# Patient Record
Sex: MALE | Race: WHITE | NOT HISPANIC OR LATINO | Employment: FULL TIME | URBAN - METROPOLITAN AREA
[De-identification: names, ages, dates, MRNs, and addresses within clinical notes are randomized per-mention and may not be internally consistent; named-entity substitution may affect disease eponyms.]

---

## 2017-12-12 ENCOUNTER — TELEPHONE (OUTPATIENT)
Dept: INTERNAL MEDICINE | Facility: CLINIC | Age: 47
End: 2017-12-12

## 2017-12-12 DIAGNOSIS — Z00.00 ROUTINE MEDICAL EXAM: Primary | ICD-10-CM

## 2017-12-12 NOTE — TELEPHONE ENCOUNTER
----- Message from Juli Chaudhari sent at 12/12/2017  2:54 PM CST -----  Contact: self [phone]  Patient wants to be seen this year for his annual and would like blood work as well, please call him and advise.    Thank you

## 2017-12-18 ENCOUNTER — LAB VISIT (OUTPATIENT)
Dept: LAB | Facility: HOSPITAL | Age: 47
End: 2017-12-18
Payer: COMMERCIAL

## 2017-12-18 DIAGNOSIS — Z00.00 ROUTINE MEDICAL EXAM: ICD-10-CM

## 2017-12-18 LAB
ALBUMIN SERPL BCP-MCNC: 4.1 G/DL
ALP SERPL-CCNC: 68 U/L
ALT SERPL W/O P-5'-P-CCNC: 36 U/L
ANION GAP SERPL CALC-SCNC: 10 MMOL/L
AST SERPL-CCNC: 29 U/L
BILIRUB SERPL-MCNC: 0.7 MG/DL
BUN SERPL-MCNC: 20 MG/DL
CALCIUM SERPL-MCNC: 9.2 MG/DL
CHLORIDE SERPL-SCNC: 105 MMOL/L
CHOLEST SERPL-MCNC: 201 MG/DL
CHOLEST/HDLC SERPL: 7.2 {RATIO}
CO2 SERPL-SCNC: 24 MMOL/L
COMPLEXED PSA SERPL-MCNC: 0.45 NG/ML
CREAT SERPL-MCNC: 1.5 MG/DL
EST. GFR  (AFRICAN AMERICAN): >60 ML/MIN/1.73 M^2
EST. GFR  (NON AFRICAN AMERICAN): 54 ML/MIN/1.73 M^2
GLUCOSE SERPL-MCNC: 90 MG/DL
HDLC SERPL-MCNC: 28 MG/DL
HDLC SERPL: 13.9 %
LDLC SERPL CALC-MCNC: ABNORMAL MG/DL
NONHDLC SERPL-MCNC: 173 MG/DL
POTASSIUM SERPL-SCNC: 4.3 MMOL/L
PROT SERPL-MCNC: 7.1 G/DL
SODIUM SERPL-SCNC: 139 MMOL/L
TRIGL SERPL-MCNC: 571 MG/DL

## 2017-12-18 PROCEDURE — 80053 COMPREHEN METABOLIC PANEL: CPT

## 2017-12-18 PROCEDURE — 84153 ASSAY OF PSA TOTAL: CPT

## 2017-12-18 PROCEDURE — 80061 LIPID PANEL: CPT

## 2017-12-18 PROCEDURE — 36415 COLL VENOUS BLD VENIPUNCTURE: CPT

## 2017-12-20 ENCOUNTER — OFFICE VISIT (OUTPATIENT)
Dept: INTERNAL MEDICINE | Facility: CLINIC | Age: 47
End: 2017-12-20
Payer: COMMERCIAL

## 2017-12-20 VITALS
BODY MASS INDEX: 29.58 KG/M2 | HEART RATE: 72 BPM | WEIGHT: 237.88 LBS | DIASTOLIC BLOOD PRESSURE: 83 MMHG | HEIGHT: 75 IN | SYSTOLIC BLOOD PRESSURE: 124 MMHG

## 2017-12-20 DIAGNOSIS — E78.1 HYPERTRIGLYCERIDEMIA: ICD-10-CM

## 2017-12-20 DIAGNOSIS — H93.13 TINNITUS OF BOTH EARS: ICD-10-CM

## 2017-12-20 DIAGNOSIS — Z00.00 ROUTINE PHYSICAL EXAMINATION: Primary | ICD-10-CM

## 2017-12-20 PROCEDURE — 99999 PR PBB SHADOW E&M-EST. PATIENT-LVL III: CPT | Mod: PBBFAC,,, | Performed by: INTERNAL MEDICINE

## 2017-12-20 PROCEDURE — 99396 PREV VISIT EST AGE 40-64: CPT | Mod: S$GLB,,, | Performed by: INTERNAL MEDICINE

## 2017-12-20 NOTE — PROGRESS NOTES
Subjective:       Patient ID: Jason Dutton is a 47 y.o. male.    Chief Complaint: Annual Exam    HPI: Patient comes in for annual exam.  Overall he is doing well.  He does take anti-inflammatories somewhat regularly for his back and joints.  We did discuss trying to minimize this because it may be affecting his kidney function.  His creatinine has been 1.5 for a few years.  Also of note is that his triglycerides are elevated.  He says a brother of his had high triglycerides and then pancreatitis.  It was suggested that we consider medication but he wanted to try to reduce intake of fats and starches, lose weight, increase exercise and see how this goes.  His triglycerides were above 500.      Review of Systems   Constitutional: Negative for activity change, chills, fatigue, fever and unexpected weight change.   HENT: Positive for hearing loss ( he did seeAn outside ENT for this) and tinnitus. Negative for nosebleeds, rhinorrhea and trouble swallowing.    Eyes: Negative for pain, discharge and visual disturbance.   Respiratory: Negative for cough, chest tightness, shortness of breath and wheezing.    Cardiovascular: Negative for chest pain and palpitations.   Gastrointestinal: Negative for abdominal pain, blood in stool, constipation, diarrhea, nausea and vomiting.   Endocrine: Negative for polydipsia and polyuria.   Genitourinary: Negative for difficulty urinating, hematuria and urgency.   Musculoskeletal: Negative for arthralgias, joint swelling and neck pain.   Skin: Negative for rash.   Neurological: Negative for dizziness, weakness and headaches.   Hematological: Does not bruise/bleed easily.   Psychiatric/Behavioral: Negative for confusion, dysphoric mood, sleep disturbance and suicidal ideas.       Objective:      Physical Exam   Constitutional: He is oriented to person, place, and time. He appears well-developed and well-nourished. No distress.   HENT:   Head: Normocephalic and atraumatic.   Right Ear:  External ear normal.   Left Ear: External ear normal.   Mouth/Throat: Oropharynx is clear and moist. No oropharyngeal exudate.   TM's clear, pharynx clear   Eyes: Conjunctivae and EOM are normal. Pupils are equal, round, and reactive to light. No scleral icterus.   Neck: Normal range of motion. Neck supple. No thyromegaly present.   No supraclavicular nodes palpated   Cardiovascular: Normal rate, regular rhythm and normal heart sounds.    No murmur heard.  Pulmonary/Chest: Effort normal and breath sounds normal. He has no wheezes.   Abdominal: Soft. Bowel sounds are normal. He exhibits no mass. There is no tenderness.   Musculoskeletal: He exhibits no edema, tenderness or deformity.   Lymphadenopathy:     He has no cervical adenopathy.   Neurological: He is alert and oriented to person, place, and time. No cranial nerve deficit.   Skin: No rash noted. No pallor.   Psychiatric: He has a normal mood and affect. His behavior is normal. Thought content normal.       Assessment:       1. Routine physical examination    2. Hypertriglyceridemia    3. Tinnitus of both ears        Plan:       Jason was seen today for annual exam.    Diagnoses and all orders for this visit:    Routine physical examination    Hypertriglyceridemia  -     Lipid panel; Future    Tinnitus of both ears

## 2017-12-29 ENCOUNTER — OFFICE VISIT (OUTPATIENT)
Dept: OTOLARYNGOLOGY | Facility: CLINIC | Age: 47
End: 2017-12-29
Payer: COMMERCIAL

## 2017-12-29 ENCOUNTER — CLINICAL SUPPORT (OUTPATIENT)
Dept: OTOLARYNGOLOGY | Facility: CLINIC | Age: 47
End: 2017-12-29
Payer: COMMERCIAL

## 2017-12-29 VITALS
BODY MASS INDEX: 29.47 KG/M2 | DIASTOLIC BLOOD PRESSURE: 88 MMHG | SYSTOLIC BLOOD PRESSURE: 147 MMHG | HEART RATE: 61 BPM | WEIGHT: 237 LBS | HEIGHT: 75 IN

## 2017-12-29 DIAGNOSIS — M95.0 NASAL VALVE COLLAPSE: ICD-10-CM

## 2017-12-29 DIAGNOSIS — J34.3 HYPERTROPHY OF BOTH INFERIOR NASAL TURBINATES: ICD-10-CM

## 2017-12-29 DIAGNOSIS — J30.9 ALLERGIC RHINITIS, UNSPECIFIED CHRONICITY, UNSPECIFIED SEASONALITY, UNSPECIFIED TRIGGER: ICD-10-CM

## 2017-12-29 DIAGNOSIS — H90.42 SENSORINEURAL HEARING LOSS (SNHL) OF LEFT EAR WITH UNRESTRICTED HEARING OF RIGHT EAR: ICD-10-CM

## 2017-12-29 DIAGNOSIS — H90.42 SENSORINEURAL HEARING LOSS (SNHL) OF LEFT EAR WITH UNRESTRICTED HEARING OF RIGHT EAR: Primary | ICD-10-CM

## 2017-12-29 DIAGNOSIS — H93.12 TINNITUS, LEFT: Primary | ICD-10-CM

## 2017-12-29 DIAGNOSIS — J34.2 NASAL SEPTAL DEVIATION: ICD-10-CM

## 2017-12-29 DIAGNOSIS — H93.12 TINNITUS OF LEFT EAR: ICD-10-CM

## 2017-12-29 PROBLEM — J34.829 NASAL VALVE COLLAPSE: Status: ACTIVE | Noted: 2017-12-29

## 2017-12-29 PROCEDURE — 92567 TYMPANOMETRY: CPT | Mod: S$GLB,,, | Performed by: AUDIOLOGIST

## 2017-12-29 PROCEDURE — 92557 COMPREHENSIVE HEARING TEST: CPT | Mod: S$GLB,,, | Performed by: AUDIOLOGIST

## 2017-12-29 PROCEDURE — 99204 OFFICE O/P NEW MOD 45 MIN: CPT | Mod: S$GLB,,, | Performed by: SPECIALIST

## 2017-12-29 RX ORDER — IBUPROFEN 200 MG
200 TABLET ORAL EVERY 6 HOURS PRN
COMMUNITY
End: 2020-07-14

## 2017-12-29 NOTE — PROGRESS NOTES
Normal hearing in the right ear.  Normal to moderate rising to mild sensorineural hearing loss in the left ear.  Type A tympanograms bilaterally.  Could not maintain seal for reflexes bilaterally.

## 2017-12-29 NOTE — PATIENT INSTRUCTIONS
Tinnitus  Tinnitus (Ringing in the Ears)     Treatment may include maskers and hearing aids.     Tinnitus is the term for a noise in your ear not caused by an outside sound. The noise might be a ringing, clicking, hiss, or roar. It can vary in pitch and may be soft or quite loud. For some people, tinnitus is a minor nuisance. But for others, the noise can make it hard to hear, work, and even sleep. When tinnitus can't be cured, a number of treatments may offer relief.  What causes tinnitus?  Loud noises, hearing loss, and ear wax can cause tinnitus. So can certain medicines. Large amounts of aspirin or caffeine are sometimes to blame. In many cases, the exact cause of tinnitus is unknown.  How is tinnitus treated?  Identifying and removing the cause is the best way to treat tinnitus. For that reason, your healthcare provider may refer you to an otolaryngologist (ear, nose, and throat doctor). Your hearing may also be checked by an audiologist (hearing specialist). If you have hearing loss, wearing a hearing aid may help your tinnitus. When the cause can't be found, the tinnitus itself may be treated. Some of the treatments are listed below, and your healthcare provider can tell you more about them:  · Maskers are small devices that look like hearing aids. They emit a pleasant sound that helps cover up the ringing in your ears. Hearing aids and maskers are sometimes used together.  · Medicines that treat anxiety and depression may ease tinnitus in some people.  · Hypnosis or relaxation therapy may help head noise seem less severe.  · Tinnitus retraining therapy combines counseling and maskers. Both can help take your mind off the tinnitus.  For more information  · American Speech-Hearing-Language Association [phone] www.sherrie.org  · American Tinnitus Association [phone] www.namrata.org  · National [place] on Deafness and other Communication Disorders 949-241-8082 www.nidcd.nih.gov   Date Last Reviewed:  7/1/2016  © 9416-8776 [employer]. [address] [place] [address], [place] [address]. All rights reserved. This information is not intended as a substitute for professional medical care. Always follow your healthcare professional's instructions.

## 2018-01-11 ENCOUNTER — HOSPITAL ENCOUNTER (OUTPATIENT)
Dept: RADIOLOGY | Facility: OTHER | Age: 48
Discharge: HOME | End: 2018-01-11
Attending: SPECIALIST
Payer: COMMERCIAL

## 2018-01-11 ENCOUNTER — TELEPHONE (OUTPATIENT)
Dept: OTOLARYNGOLOGY | Facility: CLINIC | Age: 48
End: 2018-01-11

## 2018-01-11 DIAGNOSIS — H90.42 SENSORINEURAL HEARING LOSS (SNHL) OF LEFT EAR WITH UNRESTRICTED HEARING OF RIGHT EAR: ICD-10-CM

## 2018-01-11 DIAGNOSIS — H93.12 TINNITUS OF LEFT EAR: ICD-10-CM

## 2018-01-11 PROCEDURE — 70553 MRI BRAIN STEM W/O & W/DYE: CPT | Mod: 26,,, | Performed by: RADIOLOGY

## 2018-01-11 PROCEDURE — A9585 GADOBUTROL INJECTION: HCPCS | Performed by: SPECIALIST

## 2018-01-11 PROCEDURE — 25500020 PHARM REV CODE 255: Performed by: SPECIALIST

## 2018-01-11 PROCEDURE — 70553 MRI BRAIN STEM W/O & W/DYE: CPT | Mod: TC

## 2018-01-11 RX ORDER — GADOBUTROL 604.72 MG/ML
10 INJECTION INTRAVENOUS
Status: COMPLETED | OUTPATIENT
Start: 2018-01-11 | End: 2018-01-11

## 2018-01-11 RX ADMIN — GADOBUTROL 10 ML: 604.72 INJECTION INTRAVENOUS at 04:01

## 2018-01-11 NOTE — TELEPHONE ENCOUNTER
Call Mr. Dutton actually on 01/10/2018 at 4:38 pm evening. His concern was that Dr Turner order an MRI for him upon scheduling his appt. With radiology there were not clear on if his MRI is with are without contrast.    Clarity with  that his MRI is to be with contrast per Dr. Turner orders. I also inform Mr. Dutton of his financial lability.

## 2018-01-11 NOTE — TELEPHONE ENCOUNTER
----- Message from Susan Jorgensen sent at 1/10/2018  3:22 PM CST -----  Contact: pt  x_  1st Request  _  2nd Request  _  3rd Request    Who: YARELY SALOMON [[id number]]    Why: Patient would like to speak with staff in reference to his MRI that scheduled for tomorrow.     What Number to Call Back:[phone]    When to Expect a call back: (Within 24 hours)    Please return the call at earliest convenience. Thanks!

## 2018-01-29 ENCOUNTER — OFFICE VISIT (OUTPATIENT)
Dept: OTOLARYNGOLOGY | Facility: CLINIC | Age: 48
End: 2018-01-29
Payer: COMMERCIAL

## 2018-01-29 VITALS
HEIGHT: 75 IN | DIASTOLIC BLOOD PRESSURE: 89 MMHG | BODY MASS INDEX: 29.47 KG/M2 | TEMPERATURE: 98 F | WEIGHT: 237 LBS | SYSTOLIC BLOOD PRESSURE: 133 MMHG | HEART RATE: 56 BPM

## 2018-01-29 DIAGNOSIS — M95.0 NASAL VALVE COLLAPSE: ICD-10-CM

## 2018-01-29 DIAGNOSIS — J34.2 NASAL SEPTAL DEVIATION: ICD-10-CM

## 2018-01-29 DIAGNOSIS — H90.42 SENSORINEURAL HEARING LOSS (SNHL) OF LEFT EAR WITH UNRESTRICTED HEARING OF RIGHT EAR: Primary | ICD-10-CM

## 2018-01-29 DIAGNOSIS — H93.12 TINNITUS OF LEFT EAR: ICD-10-CM

## 2018-01-29 DIAGNOSIS — J30.9 ALLERGIC RHINITIS, UNSPECIFIED CHRONICITY, UNSPECIFIED SEASONALITY, UNSPECIFIED TRIGGER: ICD-10-CM

## 2018-01-29 PROCEDURE — 99214 OFFICE O/P EST MOD 30 MIN: CPT | Mod: S$GLB,,, | Performed by: SPECIALIST

## 2018-01-29 NOTE — PROGRESS NOTES
Subjective:       Patient ID: Jason Dutton is a 47 y.o. male.    Chief Complaint: Follow-up    The patient is returning for a follow-up visit.  He continues to have tinnitus bilaterally.  It varies both in intensity and in frequency.  He has been taking lipoflavonoid's for the last month and is uncertain that he is noticing any particular changes.  She also using a noise machine in his bedroom at night.  He has been using Flonase intermittently but does not feel that he needs to be taking anything at the moment.      Review of Systems   Constitutional: Negative for activity change, appetite change, chills, fatigue, fever and unexpected weight change.   HENT: Positive for congestion, postnasal drip, sinus pressure, sore throat and tinnitus (on the left only). Negative for drooling, ear discharge, ear pain, facial swelling, hearing loss, mouth sores, rhinorrhea, sinus pain, sneezing, trouble swallowing and voice change.    Eyes: Negative for photophobia, pain, discharge, redness, itching and visual disturbance.   Respiratory: Positive for cough. Negative for apnea, choking, shortness of breath and wheezing.    Cardiovascular: Negative for chest pain and palpitations.   Gastrointestinal: Negative for abdominal distention, abdominal pain, nausea and vomiting.   Musculoskeletal: Negative for arthralgias, myalgias, neck pain and neck stiffness.   Skin: Negative.  Negative for color change, pallor and rash.   Allergic/Immunologic: Negative for environmental allergies, food allergies and immunocompromised state.   Neurological: Positive for headaches. Negative for dizziness, seizures, facial asymmetry, speech difficulty, weakness, light-headedness and numbness.   Hematological: Negative for adenopathy. Does not bruise/bleed easily.   Psychiatric/Behavioral: Negative for agitation, confusion, decreased concentration and sleep disturbance.       Objective:      Physical Exam   Constitutional: He is oriented to person,  place, and time. He appears well-developed and well-nourished. He is cooperative.   HENT:   Head: Normocephalic.   Right Ear: External ear and ear canal normal. Tympanic membrane is retracted.   Left Ear: External ear and ear canal normal. Tympanic membrane is retracted.   Nose: Mucosal edema (cyanotic, boggy inferior turbinates bilaterally), rhinorrhea (clear mucus bilaterally), nasal deformity (typically deviated to the right, high deviation to the left, bilateral internal nasal valve collapse with deepinspiration) and septal deviation (To the right with inferior septum deviated off the nasal spine to the left) present.   Mouth/Throat: Uvula is midline, oropharynx is clear and moist and mucous membranes are normal.   Eyes: EOM and lids are normal. Pupils are equal, round, and reactive to light. Right eye exhibits no discharge and no exudate. Left eye exhibits no discharge and no exudate. Right conjunctiva is injected. Left conjunctiva is injected.   Neck: Trachea normal and normal range of motion. No muscular tenderness present. No tracheal deviation present. No thyroid mass and no thyromegaly present.   Cardiovascular: Normal rate, regular rhythm, normal heart sounds and normal pulses.    Pulmonary/Chest: Effort normal and breath sounds normal. No stridor. He has no wheezes. He has no rhonchi. He has no rales.   Abdominal: Soft. Bowel sounds are normal. There is no tenderness.   Musculoskeletal: Normal range of motion.   Lymphadenopathy:        Head (right side): No submental, no submandibular, no preauricular, no posterior auricular and no occipital adenopathy present.        Head (left side): No submental, no submandibular, no preauricular, no posterior auricular and no occipital adenopathy present.     He has no cervical adenopathy.   Neurological: He is alert and oriented to person, place, and time. He has normal strength. No cranial nerve deficit or sensory deficit. Gait normal.   Skin: Skin is warm and dry.  No petechiae and no rash noted. No cyanosis. Nails show no clubbing.   Psychiatric: He has a normal mood and affect. His speech is normal and behavior is normal. Judgment and thought content normal. Cognition and memory are normal.       MRI of the internal auditory canals and brain-no tumor or stroke, multiple hyperintense areas in the white matter  Assessment:       1. Sensorineural hearing loss (SNHL) of left ear with unrestricted hearing of right ear    2. Tinnitus of left ear    3. Nasal septal deviation    4. Nasal valve collapse    5. Allergic rhinitis, unspecified chronicity, unspecified seasonality, unspecified trigger        Plan:       I will have the patient continue with his Leppo flavonoid's.  He will use the Flonase and OTC antihistamine to control allergy symptoms.  He will return sooner if there are problems.

## 2018-01-29 NOTE — PATIENT INSTRUCTIONS
Tinnitus (Ringing in the Ears)     Treatment may include maskers and hearing aids.     Tinnitus is the term for a noise in your ear not caused by an outside sound. The noise might be a ringing, clicking, hiss, or roar. It can vary in pitch and may be soft or quite loud. For some people, tinnitus is a minor nuisance. But for others, the noise can make it hard to hear, work, and even sleep. When tinnitus can't be cured, a number of treatments may offer relief.  What causes tinnitus?  Loud noises, hearing loss, and ear wax can cause tinnitus. So can certain medicines. Large amounts of aspirin or caffeine are sometimes to blame. In many cases, the exact cause of tinnitus is unknown.  How is tinnitus treated?  Identifying and removing the cause is the best way to treat tinnitus. For that reason, your healthcare provider may refer you to an otolaryngologist (ear, nose, and throat doctor). Your hearing may also be checked by an audiologist (hearing specialist). If you have hearing loss, wearing a hearing aid may help your tinnitus. When the cause can't be found, the tinnitus itself may be treated. Some of the treatments are listed below, and your healthcare provider can tell you more about them:  · Maskers are small devices that look like hearing aids. They emit a pleasant sound that helps cover up the ringing in your ears. Hearing aids and maskers are sometimes used together.  · Medicines that treat anxiety and depression may ease tinnitus in some people.  · Hypnosis or relaxation therapy may help head noise seem less severe.  · Tinnitus retraining therapy combines counseling and maskers. Both can help take your mind off the tinnitus.  For more information  · American Speech-Hearing-Language Association [phone] www.sherrie.org  · American Tinnitus Association [phone] www.namrata.org  · National [place] on Deafness and other Communication Disorders 835-991-8894 www.nidcd.nih.gov   Date Last Reviewed: 7/1/2016  ©  5269-9293 [employer]. [address] [place] [address], [place] [address]. All rights reserved. This information is not intended as a substitute for professional medical care. Always follow your healthcare professional's instructions.

## 2018-05-30 ENCOUNTER — PATIENT MESSAGE (OUTPATIENT)
Dept: INTERNAL MEDICINE | Facility: CLINIC | Age: 48
End: 2018-05-30

## 2018-05-31 ENCOUNTER — PATIENT MESSAGE (OUTPATIENT)
Dept: INTERNAL MEDICINE | Facility: CLINIC | Age: 48
End: 2018-05-31

## 2019-02-07 ENCOUNTER — TELEPHONE (OUTPATIENT)
Dept: SPINE | Facility: CLINIC | Age: 49
End: 2019-02-07

## 2019-02-07 DIAGNOSIS — M54.50 LUMBAR PAIN: Primary | ICD-10-CM

## 2019-02-07 NOTE — PROGRESS NOTES
Subjective:      Patient ID: Jason Dutton is a 48 y.o. male.    Chief Complaint: Low-back Pain      HPI  (Kalyvas)    History of hearing loss.     Chronic intermittent LBP over last 10 years or so that was usually self resolving. Saw Natasha back in 2015 with known annular tear L4-L5 per MRI 11/5/15.     Current flare up started on Tuesday with no injury. Now with constant right sided LBP with some radiation into his hip/buttock. No leg pain. Pain is better with laying flat. Pain is worse with walking and prolonged sitting. No numbness, tingling, or weakness in his leg. Pain is an 8 on a scale of 1-10. Pain can be sharp, shooting, stabbing, or aching. Has not been able to work in last two days due to pain.     He took some skelaxin and motrin without significant improvement. He did PT ([facility]) back in 2015 with improvement. He does work out on his own. No lumbar surgery. He had good improvement of his pain after bilateral L2-L5 MBB by Dr. Felder on 11/27/15.       Review of Systems   Constitution: Negative for fever, weakness, malaise/fatigue, night sweats, weight gain and weight loss.   HENT: Positive for hearing loss and tinnitus. Negative for nosebleeds and odynophagia.    Eyes: Negative for blurred vision and double vision.   Cardiovascular: Negative for chest pain, irregular heartbeat and palpitations.   Respiratory: Negative for cough, hemoptysis, shortness of breath and wheezing.    Endocrine: Negative for cold intolerance and polydipsia.   Hematologic/Lymphatic: Does not bruise/bleed easily.   Skin: Negative for dry skin, poor wound healing, rash and suspicious lesions.   Musculoskeletal: Positive for back pain and muscle cramps.        See HPI for pertinent positives.   Gastrointestinal: Negative for bloating, abdominal pain, constipation, diarrhea, hematochezia, melena, nausea and vomiting.   Genitourinary: Negative for bladder incontinence, dysuria, hematuria, hesitancy and incomplete emptying.    Neurological: Positive for paresthesias. Negative for disturbances in coordination, dizziness, focal weakness, headaches, loss of balance, numbness and seizures.   Psychiatric/Behavioral: Negative for depression and hallucinations. The patient is not nervous/anxious.            Objective:        General: Jason is well-developed, well-nourished, appears stated age, in no acute distress, alert and oriented to time, place and person.     General    Vitals reviewed.  Constitutional: He is oriented to person, place, and time. He appears well-developed and well-nourished.   Pulmonary/Chest: Effort normal.   Abdominal: He exhibits no distension.   Neurological: He is alert and oriented to person, place, and time.   Psychiatric: He has a normal mood and affect. His behavior is normal. Judgment and thought content normal.           Gait: normal, tandem walking is normal and he is able to heel/toe stand.     On exam of the lumbar spine, Inspection of back is normal, mild right sided lower lumbar tenderness.     Skin in lumbar region is warm to the touch without visible rashes.     muscle tone normal without spasm, limited range of motion with pain  Pain in extension.    Strength testing of the bilateral LEs shows  Right hip abduction:  +5/5  Left hip abduction:  +5/5  Right hip flexion:  +5/5   Left hip flexion:  +5/5  Right hip extensors:  +5/5  Left hip extensors:  +5/5  Right quadriceps:  +5/5  Left quadriceps:  +5/5  Right hamstring:  +5/5  Left hamstring:  +5/5  Right dorsiflexion:  +5/5  Left dorsiflexion:  +5/5  Right plantar flexion:  +5/5  Left plantar flexion:  +5/5   Right EHL:  +5/5   Left EHL:  +5/5    negative clonus of bilateral LEs.     negative straight leg raise on bilateral LEs.     DTRs:  Right patellar:  +2     Left patellar:  +2  Right achilles:  +2   Left achilles:  +2    Sensation is grossly intact in L2, L3, L4, L5, and S1 distribution.    Right hip has no pain with IR/ER.  No tenderness over greater  trochanteric bursa.  Left hip has no pain with IR/ER.  No tenderness over greater trochanteric bursa.     On exam of bilateral UEs, patient has full painfree ROM with no signs of clubbing, laxity, cyanosis, edema, instability, weakness, or tenderness.       XRAY INTERPRETATION:  X-rays of lumbar spine (AP/lat/flex/ext) dated 2/8/19 are personally reviewed and show minimal DDD/spondylosis L4-S1.        Assessment:       1. Myofascial muscle pain    2. Spondylosis of lumbar region without myelopathy or radiculopathy    3. DDD (degenerative disc disease), lumbosacral    4. Chronic right-sided low back pain without sciatica           Plan:       Orders Placed This Encounter    methylPREDNISolone (MEDROL DOSEPACK) 4 mg tablet         Chronic intermittent LBP over last 10 years or so that was usually self resolving. Saw Natasha back in 2015 with known annular tear L4-L5 per MRI 11/5/15. Current flare up started on Tuesday with no injury. Now with constant right sided LBP with some radiation into his hip/buttock. No leg pain. Known minimal DDD/spondylosis L4-S1. MRI 11/5/15 showed annular tear L4-L5. Previous improvement with lumbar MBB and PT. Current pain appears more myofascial in nature. Treatment options reviewed with patient along with above lumbar XRs. Following plan made:     - Medrol dose pack for symptom relief. Reviewed dosing and side effects.   - Consider revisiting PT and/or Healthy Back program if no improvement.   - Unlikely pain is hip mediated, but consider left hip XR if no improvement.   - If pain gets worse, consider updated lumbar MRI and possible injections.     Follow-up: Follow-up if symptoms worsen or fail to improve. If there are any questions prior to this, the patient was instructed to contact the office.

## 2019-02-08 ENCOUNTER — OFFICE VISIT (OUTPATIENT)
Dept: SPINE | Facility: CLINIC | Age: 49
End: 2019-02-08
Payer: COMMERCIAL

## 2019-02-08 ENCOUNTER — HOSPITAL ENCOUNTER (OUTPATIENT)
Dept: RADIOLOGY | Facility: OTHER | Age: 49
Discharge: HOME | End: 2019-02-08
Attending: PHYSICIAN ASSISTANT
Payer: COMMERCIAL

## 2019-02-08 VITALS
HEIGHT: 76 IN | DIASTOLIC BLOOD PRESSURE: 99 MMHG | SYSTOLIC BLOOD PRESSURE: 154 MMHG | HEART RATE: 63 BPM | TEMPERATURE: 97 F | WEIGHT: 240 LBS | BODY MASS INDEX: 29.22 KG/M2

## 2019-02-08 DIAGNOSIS — M79.18 MYOFASCIAL MUSCLE PAIN: Primary | ICD-10-CM

## 2019-02-08 DIAGNOSIS — M51.37 DDD (DEGENERATIVE DISC DISEASE), LUMBOSACRAL: ICD-10-CM

## 2019-02-08 DIAGNOSIS — M54.50 CHRONIC RIGHT-SIDED LOW BACK PAIN WITHOUT SCIATICA: ICD-10-CM

## 2019-02-08 DIAGNOSIS — G89.29 CHRONIC RIGHT-SIDED LOW BACK PAIN WITHOUT SCIATICA: ICD-10-CM

## 2019-02-08 DIAGNOSIS — M47.816 SPONDYLOSIS OF LUMBAR REGION WITHOUT MYELOPATHY OR RADICULOPATHY: ICD-10-CM

## 2019-02-08 DIAGNOSIS — M54.50 LUMBAR PAIN: ICD-10-CM

## 2019-02-08 PROCEDURE — 3008F PR BODY MASS INDEX (BMI) DOCUMENTED: ICD-10-PCS | Mod: CPTII,S$GLB,, | Performed by: PHYSICIAN ASSISTANT

## 2019-02-08 PROCEDURE — 99203 OFFICE O/P NEW LOW 30 MIN: CPT | Mod: S$GLB,,, | Performed by: PHYSICIAN ASSISTANT

## 2019-02-08 PROCEDURE — 72100 XR LUMBAR SPINE AP AND LAT WITH FLEX/EXT: ICD-10-PCS | Mod: 26,,, | Performed by: RADIOLOGY

## 2019-02-08 PROCEDURE — 99203 PR OFFICE/OUTPT VISIT, NEW, LEVL III, 30-44 MIN: ICD-10-PCS | Mod: S$GLB,,, | Performed by: PHYSICIAN ASSISTANT

## 2019-02-08 PROCEDURE — 99999 PR PBB SHADOW E&M-EST. PATIENT-LVL III: ICD-10-PCS | Mod: PBBFAC,,, | Performed by: PHYSICIAN ASSISTANT

## 2019-02-08 PROCEDURE — 72100 X-RAY EXAM L-S SPINE 2/3 VWS: CPT | Mod: 26,,, | Performed by: RADIOLOGY

## 2019-02-08 PROCEDURE — 72120 XR LUMBAR SPINE AP AND LAT WITH FLEX/EXT: ICD-10-PCS | Mod: 26,,, | Performed by: RADIOLOGY

## 2019-02-08 PROCEDURE — 72120 X-RAY BEND ONLY L-S SPINE: CPT | Mod: 26,,, | Performed by: RADIOLOGY

## 2019-02-08 PROCEDURE — 99999 PR PBB SHADOW E&M-EST. PATIENT-LVL III: CPT | Mod: PBBFAC,,, | Performed by: PHYSICIAN ASSISTANT

## 2019-02-08 PROCEDURE — 72120 X-RAY BEND ONLY L-S SPINE: CPT | Mod: TC,FY

## 2019-02-08 PROCEDURE — 3008F BODY MASS INDEX DOCD: CPT | Mod: CPTII,S$GLB,, | Performed by: PHYSICIAN ASSISTANT

## 2019-02-08 RX ORDER — METHYLPREDNISOLONE 4 MG/1
TABLET ORAL
Qty: 1 PACKAGE | Refills: 0 | Status: SHIPPED | OUTPATIENT
Start: 2019-02-08 | End: 2019-07-24

## 2019-05-17 DIAGNOSIS — Z12.11 COLON CANCER SCREENING: ICD-10-CM

## 2019-07-23 NOTE — PROGRESS NOTES
Subjective:      Patient ID: Jason Dutton is a 49 y.o. male.    Chief Complaint: Follow-up      HPI  (Kalyvas)    History of hearing loss.     Known minimal DDD/spondylosis L4-S1. MRI 11/5/15 showed annular tear L4-L5. Previous improvement with lumbar MBB and PT.     Seen back in February and given dose pack. Lost to follow up.     Thinks he had improvement with dose pack. History of constant right sided LBP x 12 years with intermittent flare ups. Most recent flare up 2-3 weeks ago with constant worsening pain. Pain radiates into buttock and hip on right. No leg pain. No left sided pain. Pain is worse with bending, lifting. Pain is better with motrin/aleve and ice. He has spasms. Pain is sharp. Some improvement with Healthy Back program in the past. He is afraid to work out- scared it will aggravate his back. He rates his pain as a 3 on a scale of 1-10. Had improvement with MBB in 2015 but never had RFA done. No surgery on his back.       Review of Systems   Constitution: Negative for chills, fever, night sweats and weight gain.   Gastrointestinal: Negative for bowel incontinence, nausea and vomiting.   Genitourinary: Negative for bladder incontinence.   Neurological: Negative for disturbances in coordination and loss of balance.           Objective:        General: Jason is well-developed, well-nourished, appears stated age, in no acute distress, alert and oriented to time, place and person.     Ortho/SPM Exam     Patient sits comfortably in the exam room and answers questions appropriately. Grossly patient is able to move bilateral LEs without difficulty. Ambulates normally.     No gross lower lumbar tenderness. Mild pain with forward flexion of lumbar spine. Minimal pain with extension of lumbar spine.     Strength testing of the bilateral LEs shows  Right hip abduction:  +5/5  Left hip abduction:  +5/5  Right hip flexion:  +5/5   Left hip flexion:  +5/5  Right hip extensors:  +5/5  Left hip extensors:   +5/5  Right quadriceps:  +5/5  Left quadriceps:  +5/5  Right hamstring:  +5/5  Left hamstring:  +5/5  Right dorsiflexion:   +5/5  Left dorsiflexion:  +5/5  Right plantar flexion:  +5/5  Left plantar flexion:  +5/5   Right EHL:  +5/5   Left EHL:  +5/5    No pain with IR/ER of his bilateral hips.     Sensation is grossly intact to light touch in bilateral LEs.         Assessment:       1. Chronic right-sided low back pain without sciatica    2. DDD (degenerative disc disease), lumbosacral    3. Spondylosis of lumbar region without myelopathy or radiculopathy    4. Myofascial muscle pain           Plan:       Orders Placed This Encounter    MRI LUMBAR SPINE WITHOUT CONTRAST    Ambulatory referral to Pain Clinic    methylPREDNISolone (MEDROL DOSEPACK) 4 mg tablet    naproxen (NAPROSYN) 500 MG tablet         Chronic constant right sided LBP x years with intermittent flare ups. Now with 2-3 week history of increased right sided LBP with radiation into his hip/buttock. No leg pain. No left sided back pain. Known minimal DDD/spondylosis L4-S1. MRI 11/5/15 showed annular tear L4-L5. Previous improvement with lumbar MBB and PT. Treatment options reviewed with patient and following plan made:     - Medrol dose pack for symptom relief. Reviewed dosing and side effects.   - Refill of naproxen to take after dose pack if needed.   - Updated lumbar MRI to evaluate for annular tear.   - Follow up with Dr. Felder to consider lumbar injections (?facet/MBB).   - [facility]     Follow-up: Follow up if symptoms worsen or fail to improve. If there are any questions prior to this, the patient was instructed to contact the office.

## 2019-07-24 ENCOUNTER — OFFICE VISIT (OUTPATIENT)
Dept: SPINE | Facility: CLINIC | Age: 49
End: 2019-07-24
Payer: COMMERCIAL

## 2019-07-24 VITALS — HEART RATE: 79 BPM | SYSTOLIC BLOOD PRESSURE: 141 MMHG | DIASTOLIC BLOOD PRESSURE: 86 MMHG

## 2019-07-24 DIAGNOSIS — M79.18 MYOFASCIAL MUSCLE PAIN: ICD-10-CM

## 2019-07-24 DIAGNOSIS — M47.816 SPONDYLOSIS OF LUMBAR REGION WITHOUT MYELOPATHY OR RADICULOPATHY: ICD-10-CM

## 2019-07-24 DIAGNOSIS — M51.37 DDD (DEGENERATIVE DISC DISEASE), LUMBOSACRAL: ICD-10-CM

## 2019-07-24 DIAGNOSIS — G89.29 CHRONIC RIGHT-SIDED LOW BACK PAIN WITHOUT SCIATICA: Primary | ICD-10-CM

## 2019-07-24 DIAGNOSIS — M54.50 CHRONIC RIGHT-SIDED LOW BACK PAIN WITHOUT SCIATICA: Primary | ICD-10-CM

## 2019-07-24 PROCEDURE — 99999 PR PBB SHADOW E&M-EST. PATIENT-LVL IV: ICD-10-PCS | Mod: PBBFAC,,, | Performed by: PHYSICIAN ASSISTANT

## 2019-07-24 PROCEDURE — 99213 OFFICE O/P EST LOW 20 MIN: CPT | Mod: S$GLB,,, | Performed by: PHYSICIAN ASSISTANT

## 2019-07-24 PROCEDURE — 99213 PR OFFICE/OUTPT VISIT, EST, LEVL III, 20-29 MIN: ICD-10-PCS | Mod: S$GLB,,, | Performed by: PHYSICIAN ASSISTANT

## 2019-07-24 PROCEDURE — 99999 PR PBB SHADOW E&M-EST. PATIENT-LVL IV: CPT | Mod: PBBFAC,,, | Performed by: PHYSICIAN ASSISTANT

## 2019-07-24 RX ORDER — METHYLPREDNISOLONE 4 MG/1
TABLET ORAL
Qty: 1 PACKAGE | Refills: 0 | Status: SHIPPED | OUTPATIENT
Start: 2019-07-24 | End: 2019-10-28

## 2019-07-24 RX ORDER — NAPROXEN 500 MG/1
500 TABLET ORAL 2 TIMES DAILY WITH MEALS
Qty: 60 TABLET | Refills: 2 | Status: SHIPPED | OUTPATIENT
Start: 2019-07-24 | End: 2019-10-03 | Stop reason: SDUPTHER

## 2019-07-24 NOTE — PATIENT INSTRUCTIONS
It was nice to see you again!    You have some mild wear and tear in your lower back and this is likely what is causing your pain. I recommend updated lumbar MRI to get a better idea what is going on.     I sent a steroid pack to your pharmacy- take this if pain is severe. When taking this, do not take naproxen or advil.     If pain is not severe, you can take naproxen (I sent this to your pharmacy as well). Take with food and stop if you have any GI upset.     I want you to see Dr. Felder after your MRI to discuss other treatment options. Email me if you need anything.     Malissa

## 2019-07-31 ENCOUNTER — PATIENT MESSAGE (OUTPATIENT)
Dept: PAIN MEDICINE | Facility: CLINIC | Age: 49
End: 2019-07-31

## 2019-08-11 ENCOUNTER — PATIENT MESSAGE (OUTPATIENT)
Dept: SPINE | Facility: CLINIC | Age: 49
End: 2019-08-11

## 2019-08-12 ENCOUNTER — HOSPITAL ENCOUNTER (OUTPATIENT)
Dept: RADIOLOGY | Facility: HOSPITAL | Age: 49
Discharge: HOME | End: 2019-08-12
Attending: PHYSICIAN ASSISTANT
Payer: COMMERCIAL

## 2019-08-12 DIAGNOSIS — M54.50 CHRONIC RIGHT-SIDED LOW BACK PAIN WITHOUT SCIATICA: ICD-10-CM

## 2019-08-12 DIAGNOSIS — G89.29 CHRONIC RIGHT-SIDED LOW BACK PAIN WITHOUT SCIATICA: ICD-10-CM

## 2019-08-12 DIAGNOSIS — M47.816 SPONDYLOSIS OF LUMBAR REGION WITHOUT MYELOPATHY OR RADICULOPATHY: ICD-10-CM

## 2019-08-12 DIAGNOSIS — M51.37 DDD (DEGENERATIVE DISC DISEASE), LUMBOSACRAL: ICD-10-CM

## 2019-08-12 DIAGNOSIS — M79.18 MYOFASCIAL MUSCLE PAIN: ICD-10-CM

## 2019-08-12 PROCEDURE — 72148 MRI LUMBAR SPINE W/O DYE: CPT | Mod: TC

## 2019-08-12 PROCEDURE — 72148 MRI LUMBAR SPINE WITHOUT CONTRAST: ICD-10-PCS | Mod: 26,,, | Performed by: RADIOLOGY

## 2019-08-12 PROCEDURE — 72148 MRI LUMBAR SPINE W/O DYE: CPT | Mod: 26,,, | Performed by: RADIOLOGY

## 2019-08-21 ENCOUNTER — PATIENT MESSAGE (OUTPATIENT)
Dept: SPINE | Facility: CLINIC | Age: 49
End: 2019-08-21

## 2019-08-22 ENCOUNTER — OFFICE VISIT (OUTPATIENT)
Dept: PAIN MEDICINE | Facility: CLINIC | Age: 49
End: 2019-08-22
Attending: ANESTHESIOLOGY
Payer: COMMERCIAL

## 2019-08-22 VITALS
DIASTOLIC BLOOD PRESSURE: 102 MMHG | HEART RATE: 67 BPM | RESPIRATION RATE: 18 BRPM | HEIGHT: 76 IN | SYSTOLIC BLOOD PRESSURE: 159 MMHG | WEIGHT: 249 LBS | BODY MASS INDEX: 30.32 KG/M2 | TEMPERATURE: 98 F

## 2019-08-22 DIAGNOSIS — M47.9 OSTEOARTHRITIS OF SPINE, UNSPECIFIED SPINAL OSTEOARTHRITIS COMPLICATION STATUS, UNSPECIFIED SPINAL REGION: Primary | ICD-10-CM

## 2019-08-22 DIAGNOSIS — M47.819 SPONDYLOSIS WITHOUT MYELOPATHY: ICD-10-CM

## 2019-08-22 DIAGNOSIS — M79.18 MYOFASCIAL PAIN: ICD-10-CM

## 2019-08-22 DIAGNOSIS — M54.9 DISCOGENIC PAIN: ICD-10-CM

## 2019-08-22 PROCEDURE — 99999 PR PBB SHADOW E&M-EST. PATIENT-LVL IV: ICD-10-PCS | Mod: PBBFAC,,, | Performed by: ANESTHESIOLOGY

## 2019-08-22 PROCEDURE — 99214 OFFICE O/P EST MOD 30 MIN: CPT | Mod: S$GLB,,, | Performed by: ANESTHESIOLOGY

## 2019-08-22 PROCEDURE — 99999 PR PBB SHADOW E&M-EST. PATIENT-LVL IV: CPT | Mod: PBBFAC,,, | Performed by: ANESTHESIOLOGY

## 2019-08-22 PROCEDURE — 99214 PR OFFICE/OUTPT VISIT, EST, LEVL IV, 30-39 MIN: ICD-10-PCS | Mod: S$GLB,,, | Performed by: ANESTHESIOLOGY

## 2019-08-22 RX ORDER — TIZANIDINE 4 MG/1
4 TABLET ORAL EVERY 6 HOURS PRN
Qty: 90 TABLET | Refills: 0 | Status: SHIPPED | OUTPATIENT
Start: 2019-08-22 | End: 2019-09-01

## 2019-08-22 NOTE — PROGRESS NOTES
"  Chronic Pain - New Consult    Referring Physician: Malissa Cage, FRED    Chief Complaint: No chief complaint on file.       SUBJECTIVE: Disclaimer: This note has been generated using voice-recognition software. There may be typographical errors that have been missed during proof-reading    Initial encounter:    Jason Dutton presents to the clinic for the evaluation of right sided lower back pain. The pain started about 12 years ago following no inciting event and symptoms have been worsening. Patient was last seen in clinic over 3 years ago. Recently he was seen in [facility] and was prescribed a medrol dose pack but he never took it because his "flare up" had subsided. He always has some baseline pain that has increased recently. But during flare ups the pain becomes severe and even walking becomes difficult. 3 years ago he had BL lumbar MBB that provided him long-lasting relief so he never followed through with the RFA.    Brief history:    Pain Description:    The pain is located in the right lower back area without radiation.      At BEST  4/10     At WORST  9/10 on the WORST day.      On average pain is rated as 7/10.     Today the pain is rated as 8/10    The pain is described as aching, dull and throbbing      Symptoms interfere with daily activity, sleeping and work.     Exacerbating factors: Bending and Lifting.      Mitigating factors ice and medications.     Patient denies night fever/night sweats, urinary incontinence, bowel incontinence, significant weight loss and significant motor weakness.  Patient denies any suicidal or homicidal ideations    Pain Medications:  Current:  Motrin  Alleve    Tried in Past:  NSAIDs -Never  TCA -Never  SNRI -Never  Anti-convulsants -Never  Muscle Relaxants -Never  Opioids-Never    Physical Therapy/Home Exercise: yes       report:  Not applicable    Pain Procedures:   11/27/2015  bilateral L2-3-4-5 LUMBAR FACET MEDIAL BRANCH NERVE BLOCK    Chiropractor " -never  Acupuncture - never  TENS unit -never  Spinal decompression -never  Joint replacement -never    Imagin19 MRI Lumbar  FINDINGS:  Alignment: Normal.    Vertebrae: L1 vertebral body hemangioma.  No diffuse marrow replacement process.  No fracture.    Discs: Disc desiccation and mild height loss at L3-4 and L4-5.    Cord: Normal.  Conus terminates at L1.    Degenerative findings:    T12-L1: No spinal canal stenosis or neural foraminal narrowing.    L1-L2: No spinal canal stenosis or neural foraminal narrowing.    L2-L3: No spinal canal stenosis or neural foraminal narrowing.    L3-L4: Posterior disc bulge and mild facet arthropathy without spinal canal stenosis or neural foraminal narrowing.    L4-L5: Annular fissure with posterior disc bulge and mild facet arthropathy.  No spinal canal stenosis or neural foraminal narrowing.    L5-S1: Mild facet arthropathy without spinal canal stenosis or neural narrowing.    Paraspinal muscles & soft tissues: Unremarkable.      Impression       1. Mild lumbar spondylosis with annular fissure at L4-5.       Past Medical History:   Diagnosis Date    DJD (degenerative joint disease), lumbar      Past Surgical History:   Procedure Laterality Date    BLOCK-NERVE-MEDIAL BRANCH-LUMBAR Bilateral 2015    Performed by Jake Felder MD at [facility]     Social History     Socioeconomic History    Marital status:      Spouse name: Not on file    Number of children: Not on file    Years of education: Not on file    Highest education level: Not on file   Occupational History    Not on file   Social Needs    Financial resource strain: Not on file    Food insecurity:     Worry: Not on file     Inability: Not on file    Transportation needs:     Medical: Not on file     Non-medical: Not on file   Tobacco Use    Smoking status: Never Smoker    Smokeless tobacco: Never Used   Substance and Sexual Activity    Alcohol use: Yes    Drug use: No     Sexual activity: Not on file   Lifestyle    Physical activity:     Days per week: Not on file     Minutes per session: Not on file    Stress: Not on file   Relationships    Social connections:     Talks on phone: Not on file     Gets together: Not on file     Attends Qu[employer] service: Not on file     Active member of club or organization: Not on file     Attends meetings of clubs or organizations: Not on file     Relationship status: Not on file   Other Topics Concern    Not on file   Social History Narrative    Not on file     No family history on file.    Review of patient's allergies indicates:   Allergen Reactions    Penicillins Hives       Current Outpatient Medications   Medication Sig    ibuprofen (ADVIL,MOTRIN) 200 MG tablet Take 200 mg by mouth every 6 (six) hours as needed for Pain.    methylPREDNISolone (MEDROL DOSEPACK) 4 mg tablet use as directed x 6 days. Do not take naproxen or motrin with this medication.    naproxen (NAPROSYN) 500 MG tablet Take 1 tablet (500 mg total) by mouth 2 (two) times daily with meals.     No current facility-administered medications for this visit.        REVIEW OF SYSTEMS:    GENERAL:  No weight loss, malaise or fevers.  HEENT:   No recent changes in vision or hearing  NECK:  Negative for lumps, no difficulty with swallowing.  RESPIRATORY:  Negative for cough, wheezing or shortness of breath, patient denies any recent URI.  CARDIOVASCULAR:  Negative for chest pain, leg swelling or palpitations.  GI:  Negative for abdominal discomfort, blood in stools or black stools or change in bowel habits.  MUSCULOSKELETAL:  See HPI.  SKIN:  Negative for lesions, rash, and itching.  PSYCH:  No mood disorder or recent psychosocial stressors.  Patients sleep is not disturbed secondary to pain.  HEMATOLOGY/LYMPHOLOGY:  Negative for prolonged bleeding, bruising easily or swollen nodes.  Patient is not currently taking any anti-coagulants  ENDO: No history of diabetes or thyroid  "dysfunction  NEURO:   No history of headaches, syncope, paralysis, seizures or tremors. tinnitus  All other reviewed and negative other than HPI.    OBJECTIVE:    BP (!) 159/102 (BP Location: Right arm)   Pulse 67   Temp 98.4 °F (36.9 °C)   Resp 18   Ht 6' 4" (1.93 m)   Wt 112.9 kg (249 lb)   BMI 30.31 kg/m²     PHYSICAL EXAMINATION:    GENERAL: Well appearing, in no acute distress, alert and oriented x3.  PSYCH:  Mood and affect appropriate.  SKIN: Skin color, texture, turgor normal, no rashes or lesions.  HEAD/FACE:  Normocephalic, atraumatic. Cranial nerves grossly intact.  NECK: No pain to palpation over the cervical paraspinous muscles. Spurling Negative. No pain with neck flexion, extension, or lateral flexion.   CV: RRR with palpation of the radial artery.  PULM: No evidence of respiratory difficulty, symmetric chest rise.  GI:  Soft and non-tender.  BACK: Straight leg raising in the supine position is negative to radicular pain. No pain to palpation over the facet joints of the lumbar spine or spinous processes. Normal range of motion without pain reproduction.Positive axial loading test bilateral.  Positive FABERE,Ganselin and Yeoman's test on the both side.negative FADIR  EXTREMITIES: Peripheral joint ROM is full and pain free without obvious instability or laxity in all four extremities. No deformities, edema, or skin discoloration. Good capillary refill.  MUSCULOSKELETAL: Shoulder, hip, and knee provocative maneuvers are negative.  There is no pain with palpation over the sacroiliac joints bilaterally.  FABERs test is negative.  FADIRs test is negative.   Bilateral upper and lower extremity strength is normal and symmetric.  No atrophy or tone abnormalities are noted.  NEURO: Bilateral upper and lower extremity coordination and muscle stretch reflexes are physiologic and symmetric.  Plantar response are downgoing. No clonus.  No loss of sensation is noted.  GAIT: normal.    ASSESSMENT: 49 y.o. year " old male with pain, consistent with     Encounter Diagnoses   Name Primary?    Osteoarthritis of spine, unspecified spinal osteoarthritis complication status, unspecified spinal region Yes    Discogenic pain     Spondylosis without myelopathy     Myofascial pain        PLAN:   - I have stressed the importance of physical activity and a home exercise plan to help with pain and improve health.    - Patient can continue with medications for now since they are providing benefits, using them appropriately, and without side effects.    - Schedule for Right L2,3,4,5 MBB    - Will schedule for subsequent RFA based on pain relief of MBB    - RTC 2 weeks post-procedure    - Consider ARGENIS at future visits     - Start Tizanidine 4mg q6h prn    - May continue naproxen bid    The above plan and management options were discussed at length with patient. Patient is in agreement with the above and verbalized understanding. It will be communicated with the referring physician via electronic record, fax, or mail.    Vikram hCaves I have personally reviewed the history and exam of this patient and agree with the resident/fellow/NPs note as stated above.    Jake Felder MD    08/22/2019

## 2019-08-22 NOTE — H&P (VIEW-ONLY)
"  Chronic Pain - New Consult    Referring Physician: Malissa Cage, FRED    Chief Complaint: No chief complaint on file.       SUBJECTIVE: Disclaimer: This note has been generated using voice-recognition software. There may be typographical errors that have been missed during proof-reading    Initial encounter:    Jason Dutton presents to the clinic for the evaluation of right sided lower back pain. The pain started about 12 years ago following no inciting event and symptoms have been worsening. Patient was last seen in clinic over 3 years ago. Recently he was seen in Back and Spine clinic and was prescribed a medrol dose pack but he never took it because his "flare up" had subsided. He always has some baseline pain that has increased recently. But during flare ups the pain becomes severe and even walking becomes difficult. 3 years ago he had BL lumbar MBB that provided him long-lasting relief so he never followed through with the RFA.    Brief history:    Pain Description:    The pain is located in the right lower back area without radiation.      At BEST  4/10     At WORST  9/10 on the WORST day.      On average pain is rated as 7/10.     Today the pain is rated as 8/10    The pain is described as aching, dull and throbbing      Symptoms interfere with daily activity, sleeping and work.     Exacerbating factors: Bending and Lifting.      Mitigating factors ice and medications.     Patient denies night fever/night sweats, urinary incontinence, bowel incontinence, significant weight loss and significant motor weakness.  Patient denies any suicidal or homicidal ideations    Pain Medications:  Current:  Motrin  Alleve    Tried in Past:  NSAIDs -Never  TCA -Never  SNRI -Never  Anti-convulsants -Never  Muscle Relaxants -Never  Opioids-Never    Physical Therapy/Home Exercise: yes       report:  Not applicable    Pain Procedures:   11/27/2015  bilateral L2-3-4-5 LUMBAR FACET MEDIAL BRANCH NERVE BLOCK    Chiropractor " -never  Acupuncture - never  TENS unit -never  Spinal decompression -never  Joint replacement -never    Imagin19 MRI Lumbar  FINDINGS:  Alignment: Normal.    Vertebrae: L1 vertebral body hemangioma.  No diffuse marrow replacement process.  No fracture.    Discs: Disc desiccation and mild height loss at L3-4 and L4-5.    Cord: Normal.  Conus terminates at L1.    Degenerative findings:    T12-L1: No spinal canal stenosis or neural foraminal narrowing.    L1-L2: No spinal canal stenosis or neural foraminal narrowing.    L2-L3: No spinal canal stenosis or neural foraminal narrowing.    L3-L4: Posterior disc bulge and mild facet arthropathy without spinal canal stenosis or neural foraminal narrowing.    L4-L5: Annular fissure with posterior disc bulge and mild facet arthropathy.  No spinal canal stenosis or neural foraminal narrowing.    L5-S1: Mild facet arthropathy without spinal canal stenosis or neural narrowing.    Paraspinal muscles & soft tissues: Unremarkable.      Impression       1. Mild lumbar spondylosis with annular fissure at L4-5.       Past Medical History:   Diagnosis Date    DJD (degenerative joint disease), lumbar      Past Surgical History:   Procedure Laterality Date    BLOCK-NERVE-MEDIAL BRANCH-LUMBAR Bilateral 2015    Performed by [facility] at [facility]     Social History     Socioeconomic History    Marital status:      Spouse name: Not on file    Number of children: Not on file    Years of education: Not on file    Highest education level: Not on file   Occupational History    Not on file   Social Needs    Financial resource strain: Not on file    Food insecurity:     Worry: Not on file     Inability: Not on file    Transportation needs:     Medical: Not on file     Non-medical: Not on file   Tobacco Use    Smoking status: Never Smoker    Smokeless tobacco: Never Used   Substance and Sexual Activity    Alcohol use: Yes    Drug use: No     Sexual activity: Not on file   Lifestyle    Physical activity:     Days per week: Not on file     Minutes per session: Not on file    Stress: Not on file   Relationships    Social connections:     Talks on phone: Not on file     Gets together: Not on file     Attends [employer] service: Not on file     Active member of club or organization: Not on file     Attends meetings of clubs or organizations: Not on file     Relationship status: Not on file   Other Topics Concern    Not on file   Social History Narrative    Not on file     No family history on file.    Review of patient's allergies indicates:   Allergen Reactions    Penicillins Hives       Current Outpatient Medications   Medication Sig    ibuprofen (ADVIL,MOTRIN) 200 MG tablet Take 200 mg by mouth every 6 (six) hours as needed for Pain.    methylPREDNISolone (MEDROL DOSEPACK) 4 mg tablet use as directed x 6 days. Do not take naproxen or motrin with this medication.    naproxen (NAPROSYN) 500 MG tablet Take 1 tablet (500 mg total) by mouth 2 (two) times daily with meals.     No current facility-administered medications for this visit.        REVIEW OF SYSTEMS:    GENERAL:  No weight loss, malaise or fevers.  HEENT:   No recent changes in vision or hearing  NECK:  Negative for lumps, no difficulty with swallowing.  RESPIRATORY:  Negative for cough, wheezing or shortness of breath, patient denies any recent URI.  CARDIOVASCULAR:  Negative for chest pain, leg swelling or palpitations.  GI:  Negative for abdominal discomfort, blood in stools or black stools or change in bowel habits.  MUSCULOSKELETAL:  See HPI.  SKIN:  Negative for lesions, rash, and itching.  PSYCH:  No mood disorder or recent psychosocial stressors.  Patients sleep is not disturbed secondary to pain.  HEMATOLOGY/LYMPHOLOGY:  Negative for prolonged bleeding, bruising easily or swollen nodes.  Patient is not currently taking any anti-coagulants  ENDO: No history of diabetes or thyroid  "dysfunction  NEURO:   No history of headaches, syncope, paralysis, seizures or tremors. tinnitus  All other reviewed and negative other than HPI.    OBJECTIVE:    BP (!) 159/102 (BP Location: Right arm)   Pulse 67   Temp 98.4 °F (36.9 °C)   Resp 18   Ht 6' 4" (1.93 m)   Wt 112.9 kg (249 lb)   BMI 30.31 kg/m²     PHYSICAL EXAMINATION:    GENERAL: Well appearing, in no acute distress, alert and oriented x3.  PSYCH:  Mood and affect appropriate.  SKIN: Skin color, texture, turgor normal, no rashes or lesions.  HEAD/FACE:  Normocephalic, atraumatic. Cranial nerves grossly intact.  NECK: No pain to palpation over the cervical paraspinous muscles. Spurling Negative. No pain with neck flexion, extension, or lateral flexion.   CV: RRR with palpation of the radial artery.  PULM: No evidence of respiratory difficulty, symmetric chest rise.  GI:  Soft and non-tender.  BACK: Straight leg raising in the supine position is negative to radicular pain. No pain to palpation over the facet joints of the lumbar spine or spinous processes. Normal range of motion without pain reproduction.Positive axial loading test bilateral.  Positive FABERE,Ganselin and Yeoman's test on the both side.negative FADIR  EXTREMITIES: Peripheral joint ROM is full and pain free without obvious instability or laxity in all four extremities. No deformities, edema, or skin discoloration. Good capillary refill.  MUSCULOSKELETAL: Shoulder, hip, and knee provocative maneuvers are negative.  There is no pain with palpation over the sacroiliac joints bilaterally.  FABERs test is negative.  FADIRs test is negative.   Bilateral upper and lower extremity strength is normal and symmetric.  No atrophy or tone abnormalities are noted.  NEURO: Bilateral upper and lower extremity coordination and muscle stretch reflexes are physiologic and symmetric.  Plantar response are downgoing. No clonus.  No loss of sensation is noted.  GAIT: normal.    ASSESSMENT: 49 y.o. year " old male with pain, consistent with     Encounter Diagnoses   Name Primary?    Osteoarthritis of spine, unspecified spinal osteoarthritis complication status, unspecified spinal region Yes    Discogenic pain     Spondylosis without myelopathy     Myofascial pain        PLAN:   - I have stressed the importance of physical activity and a home exercise plan to help with pain and improve health.    - Patient can continue with medications for now since they are providing benefits, using them appropriately, and without side effects.    - Schedule for Right L2,3,4,5 MBB    - Will schedule for subsequent RFA based on pain relief of MBB    - RTC 2 weeks post-procedure    - Consider ARGENIS at future visits     - Start Tizanidine 4mg q6h prn    - May continue naproxen bid    The above plan and management options were discussed at length with patient. Patient is in agreement with the above and verbalized understanding. It will be communicated with the referring physician via electronic record, fax, or mail.    Vikram Chaves I have personally reviewed the history and exam of this patient and agree with the resident/fellow/NPs note as stated above.    Jake Felder MD    08/22/2019

## 2019-08-22 NOTE — LETTER
August 30, 2019      Malissa Cage PA-C  [address]  [facility] [address]           [facility] [address]  [address]  [facility] [address]  Phone: [phone]  Fax: [phone]          Patient: Jason Dutton   MR Number: [id number]   YOB: 1970   Date of Visit: 8/22/2019       Dear Malissa Cage:    Thank you for referring Jason Dutton to me for evaluation. Attached you will find relevant portions of my assessment and plan of care.    If you have questions, please do not hesitate to call me. I look forward to following Jason Dutton along with you.    Sincerely,    Humphrey Timmons  CC:  No Recipients    If you would like to receive this communication electronically, please contact [email or web address] or [phone] to request more information on [employer] Link access.    For providers and/or their staff who would like to refer a patient to [facility], please contact us through our one-stop-shop provider referral line, [facility] , at [phone].    If you feel you have received this communication in error or would no longer like to receive these types of communications, please e-mail [email or web address]

## 2019-09-04 ENCOUNTER — HOSPITAL ENCOUNTER (OUTPATIENT)
Facility: OTHER | Age: 49
Discharge: HOME | End: 2019-09-04
Attending: ANESTHESIOLOGY | Admitting: ANESTHESIOLOGY
Payer: COMMERCIAL

## 2019-09-04 VITALS
HEIGHT: 76 IN | WEIGHT: 245 LBS | DIASTOLIC BLOOD PRESSURE: 86 MMHG | HEART RATE: 66 BPM | TEMPERATURE: 98 F | BODY MASS INDEX: 29.83 KG/M2 | RESPIRATION RATE: 18 BRPM | OXYGEN SATURATION: 96 % | SYSTOLIC BLOOD PRESSURE: 143 MMHG

## 2019-09-04 DIAGNOSIS — M47.816 OSTEOARTHRITIS OF LUMBAR SPINE, UNSPECIFIED SPINAL OSTEOARTHRITIS COMPLICATION STATUS: Primary | ICD-10-CM

## 2019-09-04 DIAGNOSIS — M47.816 LUMBAR SPONDYLOSIS: ICD-10-CM

## 2019-09-04 DIAGNOSIS — G89.29 CHRONIC PAIN: ICD-10-CM

## 2019-09-04 PROCEDURE — 64493 INJ PARAVERT F JNT L/S 1 LEV: CPT | Mod: RT,,, | Performed by: ANESTHESIOLOGY

## 2019-09-04 PROCEDURE — 64494 INJ PARAVERT F JNT L/S 2 LEV: CPT | Performed by: ANESTHESIOLOGY

## 2019-09-04 PROCEDURE — 64493 INJ PARAVERT F JNT L/S 1 LEV: CPT | Performed by: ANESTHESIOLOGY

## 2019-09-04 PROCEDURE — 64493 PR INJ DX/THER AGNT PARAVERT FACET JOINT,IMG GUIDE,LUMBAR/SAC,1ST LVL: ICD-10-PCS | Mod: RT,,, | Performed by: ANESTHESIOLOGY

## 2019-09-04 PROCEDURE — 64495 INJ PARAVERT F JNT L/S 3 LEV: CPT | Mod: RT,,, | Performed by: ANESTHESIOLOGY

## 2019-09-04 PROCEDURE — 64494 PR INJ DX/THER AGNT PARAVERT FACET JOINT,IMG GUIDE,LUMBAR/SAC, 2ND LEVEL: ICD-10-PCS | Mod: RT,,, | Performed by: ANESTHESIOLOGY

## 2019-09-04 PROCEDURE — 63600175 PHARM REV CODE 636 W HCPCS: Performed by: ANESTHESIOLOGY

## 2019-09-04 PROCEDURE — 64495 PR INJ DX/THER AGNT PARAVERT FACET JOINT,IMG GUIDE,LUMBAR/SAC, ADD LEVEL: ICD-10-PCS | Mod: RT,,, | Performed by: ANESTHESIOLOGY

## 2019-09-04 PROCEDURE — 25000003 PHARM REV CODE 250: Performed by: ANESTHESIOLOGY

## 2019-09-04 PROCEDURE — 64494 INJ PARAVERT F JNT L/S 2 LEV: CPT | Mod: RT,,, | Performed by: ANESTHESIOLOGY

## 2019-09-04 PROCEDURE — 64495 INJ PARAVERT F JNT L/S 3 LEV: CPT | Performed by: ANESTHESIOLOGY

## 2019-09-04 RX ORDER — MIDAZOLAM HYDROCHLORIDE 1 MG/ML
INJECTION INTRAMUSCULAR; INTRAVENOUS
Status: DISCONTINUED | OUTPATIENT
Start: 2019-09-04 | End: 2019-09-04 | Stop reason: HOSPADM

## 2019-09-04 RX ORDER — ALPRAZOLAM 0.5 MG/1
0.5 TABLET ORAL
Status: DISCONTINUED | OUTPATIENT
Start: 2019-09-04 | End: 2019-09-04 | Stop reason: HOSPADM

## 2019-09-04 RX ORDER — SODIUM CHLORIDE 9 MG/ML
INJECTION, SOLUTION INTRAVENOUS CONTINUOUS
Status: DISCONTINUED | OUTPATIENT
Start: 2019-09-04 | End: 2019-09-04 | Stop reason: HOSPADM

## 2019-09-04 RX ORDER — DEXAMETHASONE SODIUM PHOSPHATE 4 MG/ML
INJECTION, SOLUTION INTRA-ARTICULAR; INTRALESIONAL; INTRAMUSCULAR; INTRAVENOUS; SOFT TISSUE
Status: DISCONTINUED | OUTPATIENT
Start: 2019-09-04 | End: 2019-09-04 | Stop reason: HOSPADM

## 2019-09-04 RX ORDER — BUPIVACAINE HYDROCHLORIDE 2.5 MG/ML
INJECTION, SOLUTION EPIDURAL; INFILTRATION; INTRACAUDAL
Status: DISCONTINUED | OUTPATIENT
Start: 2019-09-04 | End: 2019-09-04 | Stop reason: HOSPADM

## 2019-09-04 RX ORDER — LIDOCAINE HYDROCHLORIDE 10 MG/ML
INJECTION INFILTRATION; PERINEURAL
Status: DISCONTINUED | OUTPATIENT
Start: 2019-09-04 | End: 2019-09-04 | Stop reason: HOSPADM

## 2019-09-04 RX ADMIN — SODIUM CHLORIDE 500 ML: 0.9 INJECTION, SOLUTION INTRAVENOUS at 01:09

## 2019-09-04 NOTE — OP NOTE
"Patient Name: Jason Dutton  MRN: [id number]    INFORMED CONSENT: The procedure, risks, benefits and options were discussed with patient. There are no contraindications to the procedure. The patient expressed understanding and agreed to proceed. The personnel performing the procedure was discussed. I verify that I personally obtained Jason's consent prior to the start of the procedure and the signed consent can be found on the patient's chart.    Procedure Date: 09/04/2019    Anesthesia: Topical    Pre Procedure diagnosis: Lumbar spondylosis [M47.816]  1. Osteoarthritis of lumbar spine, unspecified spinal osteoarthritis complication status    2. Lumbar spondylosis    3. Chronic pain      Post-Procedure diagnosis: SAME      Moderate Sedation: None    PROCEDURE: right L2-3-4-5 LUMBAR FACET MEDIAL BRANCH NERVE BLOCK      DESCRIPTION OF PROCEDURE:The patient was brought to the procedure room. After performing time out. IV access was obtained prior to the procedure. The patient was positioned prone on the fluoroscopy table. Continuous hemodynamic monitoring was initiated including blood pressure, EKG, and pulse oximetry. The area of the lumbar spine was prepped chlorhexidine and draped into a sterile field. Fluoroscopy was used to identify the location of the RT side L2, L3, L4, and L5 medial branch nerves at the junctions of the superior articular process and the transverse processes of L3, L4, L5, and the sacral ala respectively. Skin anesthesia was achieved using 5 cc of Lidocaine 1% over the injection sites. A 22 gauge, 3 1/2" spinal needle was slowly inserted at each level using AP, lateral and oblique fluoroscopic imaging. Negative aspiration for blood or CSF was confirmed.  4 ml bupivacaine 0.25%was injected at all sites. The needles were removed and bleeding was nil. A sterile dressing was applied. No specimens collected. Jason was taken back to the recovery room for further observation.     Blood Loss: " Nill  Specimen: None    Jake Felder MD

## 2019-09-04 NOTE — DISCHARGE INSTRUCTIONS
Thank you for allowing us to care for you today. You may receive a survey about the care we provided. Your feedback is valuable and helps us provide excellent care throughout the community.     Home Care Instructions for Pain Management:    1. DIET:   You may resume your normal diet today.   2. BATHING:   You may shower with luke warm water. No tub baths or anything that will soak injection sites under water for the next 24 hours.  3. DRESSING:   You may remove your bandage today.   4. ACTIVITY LEVEL:   You may resume your normal activities 24 hrs after your procedure. Nothing strenuous today.  5. MEDICATIONS:   You may resume your normal medications today. To restart blood thinners, ask your doctor.  6. DRIVING    If you have received any sedatives by mouth today, you may not drive for 12 hours.    If you have received any sedation through your IV, you may not drive for 24 hrs.   7. SPECIAL INSTRUCTIONS:   No heat to the injection site for 24 hrs including, hot bath or shower, heating pad, moist heat, or hot tubs.    Use ice pack to injection site for any pain or discomfort.  Apply ice packs for 20 minute intervals as needed.    IF you have diabetes, be sure to monitor your blood sugar more closely. IF your injection contained steroids your blood sugar levels may become higher than normal.    If you are still having pain upon discharge:  Your pain may improve over the next 48 hours. The anesthetic (numbing medication) works immediately to 48 hours. IF your injection contained a steroid (anti-inflammatory medication), it takes approximately 3 days to start feeling relief and 7-10 days to see your greatest results from the medication. It is possible you may need subsequent injections. This would be discussed at your follow up appointment with pain management or your referring doctor.    Please call the PAIN MANAGEMENT office at [phone] or ON CALL pager at [phone] if you experienced any:   -Weakness or  loss of sensation  -Fever > 101.5  -Pain uncontrolled with oral medications   -Persistent nausea, vomiting, or diarrhea  -Redness or drainage from the injection sites, or any other worrisome concerns.   If physician on call was not reached or could not communicate with our office for any reason please go to the nearest emergency department.  Adult Procedural Sedation Instructions    Recovery After Procedural Sedation (Adult)  You have been given medicine by vein to make you sleep during your surgery. This may have included both a pain medicine and sleeping medicine. Most of the effects have worn off. But you may still have some drowsiness for the next 6 to 8 hours.  Home care  Follow these guidelines when you get home:  · For the next 8 hours, you should be watched by a responsible adult. This person should make sure your condition is not getting worse.  · Don't drink any alcohol for the next 24 hours.  · Don't drive, operate dangerous machinery, or make important business or personal decisions during the next 24 hours.  Note: Your healthcare provider may tell you not to take any medicine by mouth for pain or sleep in the next 4 hours. These medicines may react with the medicines you were given in the hospital. This could cause a much stronger response than usual.  Follow-up care  Follow up with your healthcare provider if you are not alert and back to your usual level of activity within 12 hours.  When to seek medical advice  Call your healthcare provider right away if any of these occur:  · Drowsiness gets worse  · Weakness or dizziness gets worse  · Repeated vomiting  · You can't be awakened   Date Last Reviewed: 10/18/2016  © 9778-6114 [employer]. [address] [place] [address], [place] [address]. All rights reserved. This information is not intended as a substitute for professional medical care. Always follow your healthcare professional's instructions.

## 2019-09-04 NOTE — INTERVAL H&P NOTE
"The patient has been examined and the H&P has been reviewed:    I concur with the findings and no changes have occurred since H&P was written.    Anesthesia/Surgery risks, benefits and alternative options discussed and understood by patient/family.          Active Hospital Problems    Diagnosis  POA    Chronic pain [G89.29]  Yes      Resolved Hospital Problems   No resolved problems to display.     HPI  Patient presenting for Procedure(s) (LRB):  BLOCK, NERVE, L2,L3,L4,L5 MEDIAL BRANCH (Right)     Patient on Anti-coagulation No    No health changes since previous encounter    Past Medical History:   Diagnosis Date    DJD (degenerative joint disease), lumbar      Past Surgical History:   Procedure Laterality Date    BLOCK-NERVE-MEDIAL BRANCH-LUMBAR Bilateral 11/27/2015    Performed by Jake Felder MD at [facility]     Review of patient's allergies indicates:   Allergen Reactions    Penicillins Hives      Current Facility-Administered Medications   Medication    0.9%  NaCl infusion    ALPRAZolam tablet 0.5 mg       PMHx, PSHx, Allergies, Medications reviewed in epic    ROS negative except pain complaints in HPI    OBJECTIVE:    BP (!) 160/99 (BP Location: Right arm, Patient Position: Sitting)   Pulse 64   Temp 98.2 °F (36.8 °C) (Oral)   Ht 6' 4" (1.93 m)   Wt 111.1 kg (245 lb)   SpO2 97%   BMI 29.82 kg/m²     PHYSICAL EXAMINATION:    GENERAL: Well appearing, in no acute distress, alert and oriented x3.  PSYCH:  Mood and affect appropriate.  SKIN: Skin color, texture, turgor normal, no rashes or lesions which will impact the procedure.  CV: RRR with palpation of the radial artery.  PULM: No evidence of respiratory difficulty, symmetric chest rise. Clear to auscultation.  NEURO: Cranial nerves grossly intact.    Plan:    Proceed with procedure as planned Procedure(s) (LRB):  BLOCK, NERVE, L2,L3,L4,L5 MEDIAL BRANCH (Right)    Ty Caballero MD  09/04/2019            "

## 2019-09-04 NOTE — DISCHARGE SUMMARY
Discharge Note  Short Stay      SUMMARY     Admit Date: 9/4/2019    Attending Physician: Jake Felder      Discharge Physician: Jake Felder      Discharge Date: 9/4/2019 2:50 PM    Procedure(s) (LRB):  BLOCK, NERVE, L2,L3,L4,L5 MEDIAL BRANCH (Right)    Final Diagnosis: Lumbar spondylosis [M47.816]    Disposition: Home or self care    Patient Instructions:   Current Discharge Medication List      CONTINUE these medications which have NOT CHANGED    Details   ibuprofen (ADVIL,MOTRIN) 200 MG tablet Take 200 mg by mouth every 6 (six) hours as needed for Pain.      methylPREDNISolone (MEDROL DOSEPACK) 4 mg tablet use as directed x 6 days. Do not take naproxen or motrin with this medication.  Qty: 1 Package, Refills: 0    Associated Diagnoses: Chronic right-sided low back pain without sciatica; DDD (degenerative disc disease), lumbosacral; Spondylosis of lumbar region without myelopathy or radiculopathy; Myofascial muscle pain      naproxen (NAPROSYN) 500 MG tablet Take 1 tablet (500 mg total) by mouth 2 (two) times daily with meals.  Qty: 60 tablet, Refills: 2    Associated Diagnoses: Chronic right-sided low back pain without sciatica; DDD (degenerative disc disease), lumbosacral; Spondylosis of lumbar region without myelopathy or radiculopathy; Myofascial muscle pain                 Discharge Diagnosis: Lumbar spondylosis [M47.816]  Condition on Discharge: Stable with no complications to procedure   Diet on Discharge: Same as before.  Activity: as per instruction sheet.  Discharge to: Home with a responsible adult.  Follow up: 2-4 weeks    Please call my office or pager at [phone] if experienced any weakness or loss of sensation, fever > 101.5, pain uncontrolled with oral medications, persistent nausea/vomiting/or diarrhea, redness or drainage from the incisions, or any other worrisome concerns. If physician on call was not reached or could not communicate with our office for any reason please go to the  nearest emergency department

## 2019-09-16 ENCOUNTER — OFFICE VISIT (OUTPATIENT)
Dept: PAIN MEDICINE | Facility: CLINIC | Age: 49
End: 2019-09-16
Payer: COMMERCIAL

## 2019-09-16 VITALS
HEART RATE: 61 BPM | BODY MASS INDEX: 30.31 KG/M2 | HEIGHT: 76 IN | WEIGHT: 248.88 LBS | TEMPERATURE: 98 F | SYSTOLIC BLOOD PRESSURE: 151 MMHG | DIASTOLIC BLOOD PRESSURE: 95 MMHG

## 2019-09-16 DIAGNOSIS — M47.816 LUMBAR SPONDYLOSIS: Primary | ICD-10-CM

## 2019-09-16 DIAGNOSIS — M47.816 OSTEOARTHRITIS OF LUMBAR SPINE, UNSPECIFIED SPINAL OSTEOARTHRITIS COMPLICATION STATUS: ICD-10-CM

## 2019-09-16 DIAGNOSIS — G89.29 OTHER CHRONIC PAIN: ICD-10-CM

## 2019-09-16 PROCEDURE — 3008F BODY MASS INDEX DOCD: CPT | Mod: CPTII,S$GLB,, | Performed by: NURSE PRACTITIONER

## 2019-09-16 PROCEDURE — 99214 OFFICE O/P EST MOD 30 MIN: CPT | Mod: S$GLB,,, | Performed by: NURSE PRACTITIONER

## 2019-09-16 PROCEDURE — 99214 PR OFFICE/OUTPT VISIT, EST, LEVL IV, 30-39 MIN: ICD-10-PCS | Mod: S$GLB,,, | Performed by: NURSE PRACTITIONER

## 2019-09-16 PROCEDURE — 3008F PR BODY MASS INDEX (BMI) DOCUMENTED: ICD-10-PCS | Mod: CPTII,S$GLB,, | Performed by: NURSE PRACTITIONER

## 2019-09-16 PROCEDURE — 99999 PR PBB SHADOW E&M-EST. PATIENT-LVL III: CPT | Mod: PBBFAC,,, | Performed by: NURSE PRACTITIONER

## 2019-09-16 PROCEDURE — 99999 PR PBB SHADOW E&M-EST. PATIENT-LVL III: ICD-10-PCS | Mod: PBBFAC,,, | Performed by: NURSE PRACTITIONER

## 2019-09-16 NOTE — H&P (VIEW-ONLY)
"  Chronic Pain - Established Visit    Referring Physician: No ref. provider found    Chief Complaint: No chief complaint on file.       SUBJECTIVE: Disclaimer: This note has been generated using voice-recognition software. There may be typographical errors that have been missed during proof-reading    Interval History 9/16/2019:  The patient returns for follow up of right sided back pain.  He is s/p right L2,3,4,5 MBB with 80% relief.  He also underwent MBB in 2015 with 80% relief.  He is interested in RFA.  He has an aching pain across the lower back, mainly on the right.  It worsens with sitting and standing.  He has some benefit with Naproxen.  He has intermittent muscle spasms as well.  He has tried stretching and PT exercises for months without benefit.  He denies any radiation of his pain or numbness.  His pain today is 3/10.    Initial encounter:    Jason Dutton presents to the clinic for the evaluation of right sided lower back pain. The pain started about 12 years ago following no inciting event and symptoms have been worsening. Patient was last seen in clinic over 3 years ago. Recently he was seen in [facility] Sp[facility] clinic and was prescribed a medrol dose pack but he never took it because his "flare up" had subsided. He always has some baseline pain that has increased recently. But during flare ups the pain becomes severe and even walking becomes difficult. 3 years ago he had BL lumbar MBB that provided him long-lasting relief so he never followed through with the RFA.    Brief history:    Pain Description:    The pain is located in the right lower back area without radiation.      At BEST  4/10     At WORST  9/10 on the WORST day.      On average pain is rated as 7/10.     Today the pain is rated as 8/10    The pain is described as aching, dull and throbbing      Symptoms interfere with daily activity, sleeping and work.     Exacerbating factors: Bending and Lifting.      Mitigating factors ice and " medications.     Patient denies night fever/night sweats, urinary incontinence, bowel incontinence, significant weight loss and significant motor weakness.  Patient denies any suicidal or homicidal ideations    Pain Medications:  Current:  Motrin  Alleve    Tried in Past:  NSAIDs -Never  TCA -Never  SNRI -Never  Anti-convulsants -Never  Muscle Relaxants -Never  Opioids-Never    Physical Therapy/Home Exercise: yes       report:  Not applicable    Pain Procedures:   2015 Bilateral L2,3,4,5 MBB- 80% relief  19 Right L2,3,4,5 MBB- 80% relief    Chiropractor -never  Acupuncture - never  TENS unit -never  Spinal decompression -never  Joint replacement -never    Imagin19 MRI Lumbar  FINDINGS:  Alignment: Normal.    Vertebrae: L1 vertebral body hemangioma.  No diffuse marrow replacement process.  No fracture.    Discs: Disc desiccation and mild height loss at L3-4 and L4-5.    Cord: Normal.  Conus terminates at L1.    Degenerative findings:    T12-L1: No spinal canal stenosis or neural foraminal narrowing.    L1-L2: No spinal canal stenosis or neural foraminal narrowing.    L2-L3: No spinal canal stenosis or neural foraminal narrowing.    L3-L4: Posterior disc bulge and mild facet arthropathy without spinal canal stenosis or neural foraminal narrowing.    L4-L5: Annular fissure with posterior disc bulge and mild facet arthropathy.  No spinal canal stenosis or neural foraminal narrowing.    L5-S1: Mild facet arthropathy without spinal canal stenosis or neural narrowing.    Paraspinal muscles & soft tissues: Unremarkable.      Impression       1. Mild lumbar spondylosis with annular fissure at L4-5.     Lab Results   Component Value Date    WBC 4.38 2015    HGB 14.4 2015    HCT 40.5 2015    MCV 86 2015     2015     CMP  Sodium   Date Value Ref Range Status   2017 139 136 - 145 mmol/L Final     Potassium   Date Value Ref Range Status   2017 4.3 3.5 - 5.1  mmol/L Final     Chloride   Date Value Ref Range Status   12/18/2017 105 95 - 110 mmol/L Final     CO2   Date Value Ref Range Status   12/18/2017 24 23 - 29 mmol/L Final     Glucose   Date Value Ref Range Status   12/18/2017 90 70 - 110 mg/dL Final     BUN, Bld   Date Value Ref Range Status   12/18/2017 20 6 - 20 mg/dL Final     Creatinine   Date Value Ref Range Status   12/18/2017 1.5 (H) 0.5 - 1.4 mg/dL Final     Calcium   Date Value Ref Range Status   12/18/2017 9.2 8.7 - 10.5 mg/dL Final     Total Protein   Date Value Ref Range Status   12/18/2017 7.1 6.0 - 8.4 g/dL Final     Albumin   Date Value Ref Range Status   12/18/2017 4.1 3.5 - 5.2 g/dL Final     Total Bilirubin   Date Value Ref Range Status   12/18/2017 0.7 0.1 - 1.0 mg/dL Final     Comment:     For infants and newborns, interpretation of results should be based  on gestational age, weight and in agreement with clinical  observations.  Premature Infant recommended reference ranges:  Up to 24 hours.............<8.0 mg/dL  Up to 48 hours............<12.0 mg/dL  3-5 days..................<15.0 mg/dL  6-29 days.................<15.0 mg/dL       Alkaline Phosphatase   Date Value Ref Range Status   12/18/2017 68 55 - 135 U/L Final     AST   Date Value Ref Range Status   12/18/2017 29 10 - 40 U/L Final     ALT   Date Value Ref Range Status   12/18/2017 36 10 - 44 U/L Final     Anion Gap   Date Value Ref Range Status   12/18/2017 10 8 - 16 mmol/L Final     eGFR if    Date Value Ref Range Status   12/18/2017 >60 >60 mL/min/1.73 m^2 Final     eGFR if non    Date Value Ref Range Status   12/18/2017 54 (A) >60 mL/min/1.73 m^2 Final     Comment:     Calculation used to obtain the estimated glomerular filtration  rate (eGFR) is the CKD-EPI equation.          Past Medical History:   Diagnosis Date    DJD (degenerative joint disease), lumbar      Past Surgical History:   Procedure Laterality Date    BLOCK, NERVE, L2,L3,L4,L5 MEDIAL  BRANCH Right 9/4/2019    Performed by Jake Felder MD at [facility], [place], operated by [facility]    BLOCK-NERVE-MEDIAL BRANCH-LUMBAR Bilateral 11/27/2015    Performed by Jake Felder MD at [facility], [place], operated by [facility]     Social History     Socioeconomic History    Marital status:      Spouse name: Not on file    Number of children: Not on file    Years of education: Not on file    Highest education level: Not on file   Occupational History    Not on file   Social Needs    Financial resource strain: Not on file    Food insecurity:     Worry: Not on file     Inability: Not on file    Transportation needs:     Medical: Not on file     Non-medical: Not on file   Tobacco Use    Smoking status: Never Smoker    Smokeless tobacco: Never Used   Substance and Sexual Activity    Alcohol use: Yes    Drug use: No    Sexual activity: Not on file   Lifestyle    Physical activity:     Days per week: Not on file     Minutes per session: Not on file    Stress: Not on file   Relationships    Social connections:     Talks on phone: Not on file     Gets together: Not on file     Attends Bahai service: Not on file     Active member of club or organization: Not on file     Attends meetings of clubs or organizations: Not on file     Relationship status: Not on file   Other Topics Concern    Not on file   Social History Narrative    Not on file     History reviewed. No pertinent family history.    Review of patient's allergies indicates:   Allergen Reactions    Penicillins Hives       Current Outpatient Medications   Medication Sig    ibuprofen (ADVIL,MOTRIN) 200 MG tablet Take 200 mg by mouth every 6 (six) hours as needed for Pain.    naproxen (NAPROSYN) 500 MG tablet Take 1 tablet (500 mg total) by mouth 2 (two) times daily with meals.    methylPREDNISolone (MEDROL DOSEPACK) 4 mg tablet use as directed x 6 days. Do not take naproxen or motrin with this medication.     No current facility-administered medications for this visit.   "      REVIEW OF SYSTEMS:    GENERAL:  No weight loss, malaise or fevers.  HEENT:   No recent changes in vision or hearing  NECK:  Negative for lumps, no difficulty with swallowing.  RESPIRATORY:  Negative for cough, wheezing or shortness of breath, patient denies any recent URI.  CARDIOVASCULAR:  Negative for chest pain, leg swelling or palpitations.  GI:  Negative for abdominal discomfort, blood in stools or black stools or change in bowel habits.  MUSCULOSKELETAL:  See HPI.  SKIN:  Negative for lesions, rash, and itching.  PSYCH:  No mood disorder or recent psychosocial stressors.  Patients sleep is not disturbed secondary to pain.  HEMATOLOGY/LYMPHOLOGY:  Negative for prolonged bleeding, bruising easily or swollen nodes.  Patient is not currently taking any anti-coagulants  ENDO: No history of diabetes or thyroid dysfunction  NEURO:   No history of headaches, syncope, paralysis, seizures or tremors. tinnitus  All other reviewed and negative other than HPI.    OBJECTIVE:    BP (!) 151/95   Pulse 61   Temp 98.1 °F (36.7 °C)   Ht 6' 4" (1.93 m)   Wt 112.9 kg (248 lb 14.4 oz)   BMI 30.30 kg/m²     PHYSICAL EXAMINATION:    GENERAL: Well appearing, in no acute distress, alert and oriented x3.  PSYCH:  Mood and affect appropriate.  SKIN: Skin color, texture, turgor normal, no rashes or lesions.  HEAD/FACE:  Normocephalic, atraumatic. Cranial nerves grossly intact.  CV: RRR with palpation of the radial artery.  PULM: No evidence of respiratory difficulty, symmetric chest rise.  GI:  Soft and non-tender.  BACK: Straight leg raising in the supine position is negative to radicular pain. There is pain to palpation over the facet joints of the lumbar spine on the right side.  Normal range of motion with pain on extension.  Positive facet loading on the right side.  EXTREMITIES: Peripheral joint ROM is full and pain free without obvious instability or laxity in all four extremities. No deformities, edema, or skin " discoloration. Good capillary refill.  MUSCULOSKELETAL: Mild TTP over both SI joints.  There is no pain with palpation over the sacroiliac joints bilaterally.  FABERs test is negative.  FADIRs test is negative.   Bilateral upper and lower extremity strength is normal and symmetric.  No atrophy or tone abnormalities are noted.  NEURO: Bilateral upper and lower extremity coordination and muscle stretch reflexes are physiologic and symmetric.  Plantar response are downgoing. No clonus.  No loss of sensation is noted.  GAIT: Normal.    ASSESSMENT: 49 y.o. year old male with right sided lower back pain, consistent with the following diagnoses:    Encounter Diagnoses   Name Primary?    Lumbar spondylosis Yes    Osteoarthritis of lumbar spine, unspecified spinal osteoarthritis complication status     Other chronic pain        PLAN:     - I have stressed the importance of physical activity and a home exercise plan to help with pain and improve health.    - Patient can continue with medications for now since they are providing benefits, using them appropriately, and without side effects.    - He is s/p Right L2,3,4,5 MBB with 80% relief.  He also had 80% relief with bilateral L2,3,4,5 MBB in 2015.    - Schedule for right L2,3,4,5 RFA for longer pain relief.    - May continue naproxen bid.    - RTC 4 weeks after RFA.      The above plan and management options were discussed at length with patient. Patient is in agreement with the above and verbalized understanding.     Kim Price, LEA  09/16/2019

## 2019-09-16 NOTE — PROGRESS NOTES
"  Chronic Pain - Established Visit    Referring Physician: No ref. provider found    Chief Complaint: No chief complaint on file.       SUBJECTIVE: Disclaimer: This note has been generated using voice-recognition software. There may be typographical errors that have been missed during proof-reading    Interval History 9/16/2019:  The patient returns for follow up of right sided back pain.  He is s/p right L2,3,4,5 MBB with 80% relief.  He also underwent MBB in 2015 with 80% relief.  He is interested in RFA.  He has an aching pain across the lower back, mainly on the right.  It worsens with sitting and standing.  He has some benefit with Naproxen.  He has intermittent muscle spasms as well.  He has tried stretching and PT exercises for months without benefit.  He denies any radiation of his pain or numbness.  His pain today is 3/10.    Initial encounter:    Jason Dutton presents to the clinic for the evaluation of right sided lower back pain. The pain started about 12 years ago following no inciting event and symptoms have been worsening. Patient was last seen in clinic over 3 years ago. Recently he was seen in [facility] clinic and was prescribed a medrol dose pack but he never took it because his "flare up" had subsided. He always has some baseline pain that has increased recently. But during flare ups the pain becomes severe and even walking becomes difficult. 3 years ago he had BL lumbar MBB that provided him long-lasting relief so he never followed through with the RFA.    Brief history:    Pain Description:    The pain is located in the right lower back area without radiation.      At BEST  4/10     At WORST  9/10 on the WORST day.      On average pain is rated as 7/10.     Today the pain is rated as 8/10    The pain is described as aching, dull and throbbing      Symptoms interfere with daily activity, sleeping and work.     Exacerbating factors: Bending and Lifting.      Mitigating factors ice and " medications.     Patient denies night fever/night sweats, urinary incontinence, bowel incontinence, significant weight loss and significant motor weakness.  Patient denies any suicidal or homicidal ideations    Pain Medications:  Current:  Motrin  Alleve    Tried in Past:  NSAIDs -Never  TCA -Never  SNRI -Never  Anti-convulsants -Never  Muscle Relaxants -Never  Opioids-Never    Physical Therapy/Home Exercise: yes       report:  Not applicable    Pain Procedures:   2015 Bilateral L2,3,4,5 MBB- 80% relief  19 Right L2,3,4,5 MBB- 80% relief    Chiropractor -never  Acupuncture - never  TENS unit -never  Spinal decompression -never  Joint replacement -never    Imagin19 MRI Lumbar  FINDINGS:  Alignment: Normal.    Vertebrae: L1 vertebral body hemangioma.  No diffuse marrow replacement process.  No fracture.    Discs: Disc desiccation and mild height loss at L3-4 and L4-5.    Cord: Normal.  Conus terminates at L1.    Degenerative findings:    T12-L1: No spinal canal stenosis or neural foraminal narrowing.    L1-L2: No spinal canal stenosis or neural foraminal narrowing.    L2-L3: No spinal canal stenosis or neural foraminal narrowing.    L3-L4: Posterior disc bulge and mild facet arthropathy without spinal canal stenosis or neural foraminal narrowing.    L4-L5: Annular fissure with posterior disc bulge and mild facet arthropathy.  No spinal canal stenosis or neural foraminal narrowing.    L5-S1: Mild facet arthropathy without spinal canal stenosis or neural narrowing.    Paraspinal muscles & soft tissues: Unremarkable.      Impression       1. Mild lumbar spondylosis with annular fissure at L4-5.     Lab Results   Component Value Date    WBC 4.38 2015    HGB 14.4 2015    HCT 40.5 2015    MCV 86 2015     2015     CMP  Sodium   Date Value Ref Range Status   2017 139 136 - 145 mmol/L Final     Potassium   Date Value Ref Range Status   2017 4.3 3.5 - 5.1  mmol/L Final     Chloride   Date Value Ref Range Status   12/18/2017 105 95 - 110 mmol/L Final     CO2   Date Value Ref Range Status   12/18/2017 24 23 - 29 mmol/L Final     Glucose   Date Value Ref Range Status   12/18/2017 90 70 - 110 mg/dL Final     BUN, Bld   Date Value Ref Range Status   12/18/2017 20 6 - 20 mg/dL Final     Creatinine   Date Value Ref Range Status   12/18/2017 1.5 (H) 0.5 - 1.4 mg/dL Final     Calcium   Date Value Ref Range Status   12/18/2017 9.2 8.7 - 10.5 mg/dL Final     Total Protein   Date Value Ref Range Status   12/18/2017 7.1 6.0 - 8.4 g/dL Final     Albumin   Date Value Ref Range Status   12/18/2017 4.1 3.5 - 5.2 g/dL Final     Total Bilirubin   Date Value Ref Range Status   12/18/2017 0.7 0.1 - 1.0 mg/dL Final     Comment:     For infants and newborns, interpretation of results should be based  on gestational age, weight and in agreement with clinical  observations.  Premature Infant recommended reference ranges:  Up to 24 hours.............<8.0 mg/dL  Up to 48 hours............<12.0 mg/dL  3-5 days..................<15.0 mg/dL  6-29 days.................<15.0 mg/dL       Alkaline Phosphatase   Date Value Ref Range Status   12/18/2017 68 55 - 135 U/L Final     AST   Date Value Ref Range Status   12/18/2017 29 10 - 40 U/L Final     ALT   Date Value Ref Range Status   12/18/2017 36 10 - 44 U/L Final     Anion Gap   Date Value Ref Range Status   12/18/2017 10 8 - 16 mmol/L Final     eGFR if    Date Value Ref Range Status   12/18/2017 >60 >60 mL/min/1.73 m^2 Final     eGFR if non    Date Value Ref Range Status   12/18/2017 54 (A) >60 mL/min/1.73 m^2 Final     Comment:     Calculation used to obtain the estimated glomerular filtration  rate (eGFR) is the CKD-EPI equation.          Past Medical History:   Diagnosis Date    DJD (degenerative joint disease), lumbar      Past Surgical History:   Procedure Laterality Date    BLOCK, NERVE, L2,L3,L4,L5 MEDIAL  BRANCH Right 9/4/2019    Performed by Jake Felder MD at [facility], [place], operated by [facility]    BLOCK-NERVE-MEDIAL BRANCH-LUMBAR Bilateral 11/27/2015    Performed by Jake Felder MD at [facility], [place], operated by [facility]     Social History     Socioeconomic History    Marital status:      Spouse name: Not on file    Number of children: Not on file    Years of education: Not on file    Highest education level: Not on file   Occupational History    Not on file   Social Needs    Financial resource strain: Not on file    Food insecurity:     Worry: Not on file     Inability: Not on file    Transportation needs:     Medical: Not on file     Non-medical: Not on file   Tobacco Use    Smoking status: Never Smoker    Smokeless tobacco: Never Used   Substance and Sexual Activity    Alcohol use: Yes    Drug use: No    Sexual activity: Not on file   Lifestyle    Physical activity:     Days per week: Not on file     Minutes per session: Not on file    Stress: Not on file   Relationships    Social connections:     Talks on phone: Not on file     Gets together: Not on file     Attends [employer] service: Not on file     Active member of club or organization: Not on file     Attends meetings of clubs or organizations: Not on file     Relationship status: Not on file   Other Topics Concern    Not on file   Social History Narrative    Not on file     History reviewed. No pertinent family history.    Review of patient's allergies indicates:   Allergen Reactions    Penicillins Hives       Current Outpatient Medications   Medication Sig    ibuprofen (ADVIL,MOTRIN) 200 MG tablet Take 200 mg by mouth every 6 (six) hours as needed for Pain.    naproxen (NAPROSYN) 500 MG tablet Take 1 tablet (500 mg total) by mouth 2 (two) times daily with meals.    methylPREDNISolone (MEDROL DOSEPACK) 4 mg tablet use as directed x 6 days. Do not take naproxen or motrin with this medication.     No current facility-administered medications for this visit.   "      REVIEW OF SYSTEMS:    GENERAL:  No weight loss, malaise or fevers.  HEENT:   No recent changes in vision or hearing  NECK:  Negative for lumps, no difficulty with swallowing.  RESPIRATORY:  Negative for cough, wheezing or shortness of breath, patient denies any recent URI.  CARDIOVASCULAR:  Negative for chest pain, leg swelling or palpitations.  GI:  Negative for abdominal discomfort, blood in stools or black stools or change in bowel habits.  MUSCULOSKELETAL:  See HPI.  SKIN:  Negative for lesions, rash, and itching.  PSYCH:  No mood disorder or recent psychosocial stressors.  Patients sleep is not disturbed secondary to pain.  HEMATOLOGY/LYMPHOLOGY:  Negative for prolonged bleeding, bruising easily or swollen nodes.  Patient is not currently taking any anti-coagulants  ENDO: No history of diabetes or thyroid dysfunction  NEURO:   No history of headaches, syncope, paralysis, seizures or tremors. tinnitus  All other reviewed and negative other than HPI.    OBJECTIVE:    BP (!) 151/95   Pulse 61   Temp 98.1 °F (36.7 °C)   Ht 6' 4" (1.93 m)   Wt 112.9 kg (248 lb 14.4 oz)   BMI 30.30 kg/m²     PHYSICAL EXAMINATION:    GENERAL: Well appearing, in no acute distress, alert and oriented x3.  PSYCH:  Mood and affect appropriate.  SKIN: Skin color, texture, turgor normal, no rashes or lesions.  HEAD/FACE:  Normocephalic, atraumatic. Cranial nerves grossly intact.  CV: RRR with palpation of the radial artery.  PULM: No evidence of respiratory difficulty, symmetric chest rise.  GI:  Soft and non-tender.  BACK: Straight leg raising in the supine position is negative to radicular pain. There is pain to palpation over the facet joints of the lumbar spine on the right side.  Normal range of motion with pain on extension.  Positive facet loading on the right side.  EXTREMITIES: Peripheral joint ROM is full and pain free without obvious instability or laxity in all four extremities. No deformities, edema, or skin " discoloration. Good capillary refill.  MUSCULOSKELETAL: Mild TTP over both SI joints.  There is no pain with palpation over the sacroiliac joints bilaterally.  FABERs test is negative.  FADIRs test is negative.   Bilateral upper and lower extremity strength is normal and symmetric.  No atrophy or tone abnormalities are noted.  NEURO: Bilateral upper and lower extremity coordination and muscle stretch reflexes are physiologic and symmetric.  Plantar response are downgoing. No clonus.  No loss of sensation is noted.  GAIT: Normal.    ASSESSMENT: 49 y.o. year old male with right sided lower back pain, consistent with the following diagnoses:    Encounter Diagnoses   Name Primary?    Lumbar spondylosis Yes    Osteoarthritis of lumbar spine, unspecified spinal osteoarthritis complication status     Other chronic pain        PLAN:     - I have stressed the importance of physical activity and a home exercise plan to help with pain and improve health.    - Patient can continue with medications for now since they are providing benefits, using them appropriately, and without side effects.    - He is s/p Right L2,3,4,5 MBB with 80% relief.  He also had 80% relief with bilateral L2,3,4,5 MBB in 2015.    - Schedule for right L2,3,4,5 RFA for longer pain relief.    - May continue naproxen bid.    - RTC 4 weeks after RFA.      The above plan and management options were discussed at length with patient. Patient is in agreement with the above and verbalized understanding.     Kim Price, LEA  09/16/2019

## 2019-10-02 ENCOUNTER — HOSPITAL ENCOUNTER (OUTPATIENT)
Facility: OTHER | Age: 49
Discharge: HOME | End: 2019-10-02
Attending: ANESTHESIOLOGY | Admitting: ANESTHESIOLOGY
Payer: COMMERCIAL

## 2019-10-02 VITALS
HEART RATE: 70 BPM | BODY MASS INDEX: 29.22 KG/M2 | RESPIRATION RATE: 16 BRPM | DIASTOLIC BLOOD PRESSURE: 88 MMHG | OXYGEN SATURATION: 95 % | TEMPERATURE: 98 F | WEIGHT: 240 LBS | HEIGHT: 76 IN | SYSTOLIC BLOOD PRESSURE: 131 MMHG

## 2019-10-02 DIAGNOSIS — M47.816 LUMBAR SPONDYLOSIS: ICD-10-CM

## 2019-10-02 DIAGNOSIS — G89.29 CHRONIC PAIN: ICD-10-CM

## 2019-10-02 DIAGNOSIS — M47.9 OSTEOARTHRITIS OF SPINE, UNSPECIFIED SPINAL OSTEOARTHRITIS COMPLICATION STATUS, UNSPECIFIED SPINAL REGION: Primary | ICD-10-CM

## 2019-10-02 PROCEDURE — 25000003 PHARM REV CODE 250: Performed by: ANESTHESIOLOGY

## 2019-10-02 PROCEDURE — 64635 DESTROY LUMB/SAC FACET JNT: CPT | Mod: RT,,, | Performed by: ANESTHESIOLOGY

## 2019-10-02 PROCEDURE — 64636 DESTROY L/S FACET JNT ADDL: CPT | Performed by: ANESTHESIOLOGY

## 2019-10-02 PROCEDURE — 64635 DESTROY LUMB/SAC FACET JNT: CPT | Performed by: ANESTHESIOLOGY

## 2019-10-02 PROCEDURE — 64636 PR DESTROY L/S FACET JNT ADDL: ICD-10-PCS | Mod: 59,RT,, | Performed by: ANESTHESIOLOGY

## 2019-10-02 PROCEDURE — 99152 PR MOD CONSCIOUS SEDATION, SAME PHYS, 5+ YRS, FIRST 15 MIN: ICD-10-PCS | Mod: ,,, | Performed by: ANESTHESIOLOGY

## 2019-10-02 PROCEDURE — 64636 DESTROY L/S FACET JNT ADDL: CPT | Mod: 59,RT,, | Performed by: ANESTHESIOLOGY

## 2019-10-02 PROCEDURE — 63600175 PHARM REV CODE 636 W HCPCS: Performed by: ANESTHESIOLOGY

## 2019-10-02 PROCEDURE — 64635 PR DESTROY LUMB/SAC FACET JNT: ICD-10-PCS | Mod: RT,,, | Performed by: ANESTHESIOLOGY

## 2019-10-02 PROCEDURE — 99152 MOD SED SAME PHYS/QHP 5/>YRS: CPT | Mod: ,,, | Performed by: ANESTHESIOLOGY

## 2019-10-02 RX ORDER — BUPIVACAINE HYDROCHLORIDE 2.5 MG/ML
INJECTION, SOLUTION EPIDURAL; INFILTRATION; INTRACAUDAL
Status: DISCONTINUED | OUTPATIENT
Start: 2019-10-02 | End: 2019-10-02 | Stop reason: HOSPADM

## 2019-10-02 RX ORDER — SODIUM CHLORIDE 9 MG/ML
500 INJECTION, SOLUTION INTRAVENOUS CONTINUOUS
Status: DISCONTINUED | OUTPATIENT
Start: 2019-10-02 | End: 2019-10-02 | Stop reason: HOSPADM

## 2019-10-02 RX ORDER — FENTANYL CITRATE 50 UG/ML
INJECTION, SOLUTION INTRAMUSCULAR; INTRAVENOUS
Status: DISCONTINUED | OUTPATIENT
Start: 2019-10-02 | End: 2019-10-02 | Stop reason: HOSPADM

## 2019-10-02 RX ORDER — LIDOCAINE HYDROCHLORIDE 10 MG/ML
INJECTION INFILTRATION; PERINEURAL
Status: DISCONTINUED | OUTPATIENT
Start: 2019-10-02 | End: 2019-10-02 | Stop reason: HOSPADM

## 2019-10-02 RX ORDER — DEXAMETHASONE SODIUM PHOSPHATE 10 MG/ML
INJECTION INTRAMUSCULAR; INTRAVENOUS
Status: DISCONTINUED | OUTPATIENT
Start: 2019-10-02 | End: 2019-10-02 | Stop reason: HOSPADM

## 2019-10-02 RX ORDER — MIDAZOLAM HYDROCHLORIDE 1 MG/ML
INJECTION INTRAMUSCULAR; INTRAVENOUS
Status: DISCONTINUED | OUTPATIENT
Start: 2019-10-02 | End: 2019-10-02 | Stop reason: HOSPADM

## 2019-10-02 RX ADMIN — SODIUM CHLORIDE 500 ML: 0.9 INJECTION, SOLUTION INTRAVENOUS at 10:10

## 2019-10-02 NOTE — DISCHARGE INSTRUCTIONS
Thank you for allowing us to care for you today. You may receive a survey about the care we provided. Your feedback is valuable and helps us provide excellent care throughout the community.     Home Care Instructions for Pain Management:    1. DIET:   You may resume your normal diet today.   2. BATHING:   You may shower with luke warm water. No tub baths or anything that will soak injection sites under water for the next 24 hours.  3. DRESSING:   You may remove your bandage today.   4. ACTIVITY LEVEL:   You may resume your normal activities 24 hrs after your procedure. Nothing strenuous today.  5. MEDICATIONS:   You may resume your normal medications today. To restart blood thinners, ask your doctor.  6. DRIVING    If you have received any sedatives by mouth today, you may not drive for 12 hours.    If you have received any sedation through your IV, you may not drive for 24 hrs.   7. SPECIAL INSTRUCTIONS:   No heat to the injection site for 24 hrs including, hot bath or shower, heating pad, moist heat, or hot tubs.    Use ice pack to injection site for any pain or discomfort.  Apply ice packs for 20 minute intervals as needed.    IF you have diabetes, be sure to monitor your blood sugar more closely. IF your injection contained steroids your blood sugar levels may become higher than normal.    If you are still having pain upon discharge:  Your pain may improve over the next 48 hours. The anesthetic (numbing medication) works immediately to 48 hours. IF your injection contained a steroid (anti-inflammatory medication), it takes approximately 3 days to start feeling relief and 7-10 days to see your greatest results from the medication. It is possible you may need subsequent injections. This would be discussed at your follow up appointment with pain management or your referring doctor.    Please call the PAIN MANAGEMENT office at [phone] or ON CALL pager at [phone] if you experienced any:   -Weakness or  loss of sensation  -Fever > 101.5  -Pain uncontrolled with oral medications   -Persistent nausea, vomiting, or diarrhea  -Redness or drainage from the injection sites, or any other worrisome concerns.   If physician on call was not reached or could not communicate with our office for any reason please go to the nearest emergency department.    Recovery After Procedural Sedation (Adult)  You have been given medicine by vein to make you sleep during your surgery. This may have included both a pain medicine and sleeping medicine. Most of the effects have worn off. But you may still have some drowsiness for the next 6 to 8 hours.  Home care  Follow these guidelines when you get home:  · For the next 8 hours, you should be watched by a responsible adult. This person should make sure your condition is not getting worse.  · Don't drink any alcohol for the next 24 hours.  · Don't drive, operate dangerous machinery, or make important business or personal decisions during the next 24 hours.  Note: Your healthcare provider may tell you not to take any medicine by mouth for pain or sleep in the next 4 hours. These medicines may react with the medicines you were given in the hospital. This could cause a much stronger response than usual.  Follow-up care  Follow up with your healthcare provider if you are not alert and back to your usual level of activity within 12 hours.  When to seek medical advice  Call your healthcare provider right away if any of these occur:  · Drowsiness gets worse  · Weakness or dizziness gets worse  · Repeated vomiting  · You can't be awakened   Date Last Reviewed: 10/18/2016  © 1712-9058 [employer]. [address] [place] [address], [place] [address]. All rights reserved. This information is not intended as a substitute for professional medical care. Always follow your healthcare professional's instructions.

## 2019-10-02 NOTE — OP NOTE
Patient Name: Jason Dutton  MRN: [id number]    INFORMED CONSENT: The procedure, risks, benefits and options were discussed with patient. There are no contraindications to the procedure. The patient expressed understanding and agreed to proceed. The personnel performing the procedure was discussed. I verify that I personally obtained Jason's consent prior to the start of the procedure and the signed consent can be found on the patient's chart.    Procedure Date: 10/02/2019    Anesthesia: Topical    Pre Procedure diagnosis: Lumbar spondylosis [M47.816]  1. Osteoarthritis of spine, unspecified spinal osteoarthritis complication status, unspecified spinal region    2. Lumbar spondylosis    3. Chronic pain      Post-Procedure diagnosis: SAME      Moderate Sedation: Yes - Fentanyl 100 mcg and Midazolam 2 mg    PROCEDURE: right L2-3-4-5 FACET MEDIAL BRANCH NERVE RADIOFREQUENCY NEUROTOMY (lumbar)      DESCRIPTION OF PROCEDURE: The patient was brought to the procedure room.  After performing time out IV access was obtained prior to the procedure. The patient was positioned prone on the fluoroscopy table. Continuous hemodynamic monitoring was initiated including blood pressure and pulse oximetry. IV sedation was administered incrementally to allow the patient to remain comfortable and conversant throughout the procedure. The area of the lumbar spine was prepped chlorhexidine three times and draped into a sterile field.  Fluoroscopy was used to identify the location of the RT side L2, L3, L4, and L5 medial branch nerves at the junctions of the superior articular process and the transverse processes of L3, L4, L5, and the sacral ala respectively.  Skin anesthesia was achieved using 3 cc of Lidocaine 1% over the injection sites. A 18 gauge, 100mm (10mm active tip) curved RF needle was slowly inserted at each level using AP, lateral and oblique fluoroscopic imaging. Negative aspiration for blood or CSF was confirmed.  Sensory  stimulation at 50Hz below 0.5V was achieved at every level. Motor stimulation at 2Hz up to 1.5V did not cause any radicular symptoms at any level. Each level was anesthetized with 1.5 cc of lidocaine 1%.  Radiofrequency lesioning was performed for 90 seconds at 80 degrees in two different positions at each level.  Total of 4 cc of bupivacaine 0.25% and 10 mg of Decadron was injected was injected at all levels.. The needles were removed and bleeding was nil.  A sterile dressing was applied. Jason was taken back to the recovery room for further observation.     Stimulation Results:  L2 = Sensory positive @ 0.4, Motor negative @ 1.5  L3 = Sensory positive @ 0.3, Motor negative @ 1.5  L4 = Sensory positive @ 0.4, Motor negative @ 1.5  L5 = Sensory positive @ 0.5, Motor negative @ 1.5    Blood Loss: Nill  Specimen: None    Jake Felder MD

## 2019-10-02 NOTE — DISCHARGE SUMMARY
Discharge Note  Short Stay      SUMMARY     Admit Date: 10/2/2019    Attending Physician: Jake Felder      Discharge Physician: Jake Felder      Discharge Date: 10/2/2019 10:59 AM    Procedure(s) (LRB):  RADIOFREQUENCY ABLATION, RIGHT L2-L3-L4-L5 MEDIAL BRANCH (Right)    Final Diagnosis: Lumbar spondylosis [M47.816]    Disposition: Home or self care    Patient Instructions:   Current Discharge Medication List      CONTINUE these medications which have NOT CHANGED    Details   ibuprofen (ADVIL,MOTRIN) 200 MG tablet Take 200 mg by mouth every 6 (six) hours as needed for Pain.      methylPREDNISolone (MEDROL DOSEPACK) 4 mg tablet use as directed x 6 days. Do not take naproxen or motrin with this medication.  Qty: 1 Package, Refills: 0    Associated Diagnoses: Chronic right-sided low back pain without sciatica; DDD (degenerative disc disease), lumbosacral; Spondylosis of lumbar region without myelopathy or radiculopathy; Myofascial muscle pain      naproxen (NAPROSYN) 500 MG tablet Take 1 tablet (500 mg total) by mouth 2 (two) times daily with meals.  Qty: 60 tablet, Refills: 2    Associated Diagnoses: Chronic right-sided low back pain without sciatica; DDD (degenerative disc disease), lumbosacral; Spondylosis of lumbar region without myelopathy or radiculopathy; Myofascial muscle pain                 Discharge Diagnosis: Lumbar spondylosis [M47.816]  Condition on Discharge: Stable with no complications to procedure   Diet on Discharge: Same as before.  Activity: as per instruction sheet.  Discharge to: Home with a responsible adult.  Follow up: 2-4 weeks    Please call my office or pager at [phone] if experienced any weakness or loss of sensation, fever > 101.5, pain uncontrolled with oral medications, persistent nausea/vomiting/or diarrhea, redness or drainage from the incisions, or any other worrisome concerns. If physician on call was not reached or could not communicate with our office for any reason  please go to the nearest emergency department

## 2019-10-03 ENCOUNTER — PATIENT MESSAGE (OUTPATIENT)
Dept: SPINE | Facility: CLINIC | Age: 49
End: 2019-10-03

## 2019-10-03 DIAGNOSIS — M79.18 MYOFASCIAL MUSCLE PAIN: ICD-10-CM

## 2019-10-03 DIAGNOSIS — M47.816 SPONDYLOSIS OF LUMBAR REGION WITHOUT MYELOPATHY OR RADICULOPATHY: ICD-10-CM

## 2019-10-03 DIAGNOSIS — M54.50 CHRONIC RIGHT-SIDED LOW BACK PAIN WITHOUT SCIATICA: ICD-10-CM

## 2019-10-03 DIAGNOSIS — G89.29 CHRONIC RIGHT-SIDED LOW BACK PAIN WITHOUT SCIATICA: ICD-10-CM

## 2019-10-03 DIAGNOSIS — M51.37 DDD (DEGENERATIVE DISC DISEASE), LUMBOSACRAL: ICD-10-CM

## 2019-10-04 RX ORDER — NAPROXEN 500 MG/1
500 TABLET ORAL 2 TIMES DAILY WITH MEALS
Qty: 60 TABLET | Refills: 2 | Status: SHIPPED | OUTPATIENT
Start: 2019-10-04 | End: 2019-12-26

## 2019-10-04 RX ORDER — METAXALONE 800 MG/1
800 TABLET ORAL 3 TIMES DAILY PRN
Qty: 270 TABLET | Refills: 0 | Status: SHIPPED | OUTPATIENT
Start: 2019-10-04 | End: 2020-07-14 | Stop reason: SDUPTHER

## 2019-10-28 ENCOUNTER — OFFICE VISIT (OUTPATIENT)
Dept: PAIN MEDICINE | Facility: CLINIC | Age: 49
End: 2019-10-28
Payer: COMMERCIAL

## 2019-10-28 VITALS
OXYGEN SATURATION: 100 % | RESPIRATION RATE: 18 BRPM | WEIGHT: 249.31 LBS | HEART RATE: 66 BPM | SYSTOLIC BLOOD PRESSURE: 160 MMHG | BODY MASS INDEX: 30.36 KG/M2 | HEIGHT: 76 IN | TEMPERATURE: 98 F | DIASTOLIC BLOOD PRESSURE: 100 MMHG

## 2019-10-28 DIAGNOSIS — M47.816 LUMBAR SPONDYLOSIS: ICD-10-CM

## 2019-10-28 DIAGNOSIS — G89.29 OTHER CHRONIC PAIN: ICD-10-CM

## 2019-10-28 DIAGNOSIS — M47.9 OSTEOARTHRITIS OF SPINE, UNSPECIFIED SPINAL OSTEOARTHRITIS COMPLICATION STATUS, UNSPECIFIED SPINAL REGION: Primary | ICD-10-CM

## 2019-10-28 PROCEDURE — 99213 PR OFFICE/OUTPT VISIT, EST, LEVL III, 20-29 MIN: ICD-10-PCS | Mod: S$GLB,,, | Performed by: NURSE PRACTITIONER

## 2019-10-28 PROCEDURE — 99999 PR PBB SHADOW E&M-EST. PATIENT-LVL IV: ICD-10-PCS | Mod: PBBFAC,,, | Performed by: NURSE PRACTITIONER

## 2019-10-28 PROCEDURE — 3008F BODY MASS INDEX DOCD: CPT | Mod: CPTII,S$GLB,, | Performed by: NURSE PRACTITIONER

## 2019-10-28 PROCEDURE — 99213 OFFICE O/P EST LOW 20 MIN: CPT | Mod: S$GLB,,, | Performed by: NURSE PRACTITIONER

## 2019-10-28 PROCEDURE — 3008F PR BODY MASS INDEX (BMI) DOCUMENTED: ICD-10-PCS | Mod: CPTII,S$GLB,, | Performed by: NURSE PRACTITIONER

## 2019-10-28 PROCEDURE — 99999 PR PBB SHADOW E&M-EST. PATIENT-LVL IV: CPT | Mod: PBBFAC,,, | Performed by: NURSE PRACTITIONER

## 2019-10-28 NOTE — PROGRESS NOTES
"  Chronic Pain - Established Visit    Referring Physician: No ref. provider found    Chief Complaint: No chief complaint on file.       SUBJECTIVE: Disclaimer: This note has been generated using voice-recognition software. There may be typographical errors that have been missed during proof-reading    Interval History 10/28/2019:  The patient is here for follow up of right lower back pain.  He is now s/p right L2,3,4,5 RFA.  He is reporting about 60% relief.  He still has some intermittent muscle spasms on the right.  He has some benefit with Skelaxin.  He previously had benefit with [facility] and would like to repeat PT.  His pain today is 5/10.    Interval History 9/16/2019:  The patient returns for follow up of right sided back pain.  He is s/p right L2,3,4,5 MBB with 80% relief.  He also underwent MBB in 2015 with 80% relief.  He is interested in RFA.  He has an aching pain across the lower back, mainly on the right.  It worsens with sitting and standing.  He has some benefit with Naproxen.  He has intermittent muscle spasms as well.  He has tried stretching and PT exercises for months without benefit.  He denies any radiation of his pain or numbness.  His pain today is 3/10.    Initial encounter:    Jason Dutton presents to the clinic for the evaluation of right sided lower back pain. The pain started about 12 years ago following no inciting event and symptoms have been worsening. Patient was last seen in clinic over 3 years ago. Recently he was seen in [facility] and was prescribed a medrol dose pack but he never took it because his "flare up" had subsided. He always has some baseline pain that has increased recently. But during flare ups the pain becomes severe and even walking becomes difficult. 3 years ago he had BL lumbar MBB that provided him long-lasting relief so he never followed through with the RFA.    Brief history:    Pain Description:    The pain is located in the right lower back " area without radiation.      At BEST  4/10     At WORST  9/10 on the WORST day.      On average pain is rated as 7/10.     Today the pain is rated as 8/10    The pain is described as aching, dull and throbbing      Symptoms interfere with daily activity, sleeping and work.     Exacerbating factors: Bending and Lifting.      Mitigating factors ice and medications.     Patient denies night fever/night sweats, urinary incontinence, bowel incontinence, significant weight loss and significant motor weakness.  Patient denies any suicidal or homicidal ideations    Pain Medications:  Current:  Motrin  Alleve    Tried in Past:  NSAIDs -Never  TCA -Never  SNRI -Never  Anti-convulsants -Never  Muscle Relaxants -Never  Opioids-Never    Physical Therapy/Home Exercise: yes       report:  Not applicable    Pain Procedures:   2015 Bilateral L2,3,4,5 MBB- 80% relief  19 Right L2,3,4,5 MBB- 80% relief  10/2/19 Right L2,3,4,5 RFA- 60% relief    Chiropractor -never  Acupuncture - never  TENS unit -never  Spinal decompression -never  Joint replacement -never    Imagin19 MRI Lumbar  FINDINGS:  Alignment: Normal.    Vertebrae: L1 vertebral body hemangioma.  No diffuse marrow replacement process.  No fracture.    Discs: Disc desiccation and mild height loss at L3-4 and L4-5.    Cord: Normal.  Conus terminates at L1.    Degenerative findings:    T12-L1: No spinal canal stenosis or neural foraminal narrowing.    L1-L2: No spinal canal stenosis or neural foraminal narrowing.    L2-L3: No spinal canal stenosis or neural foraminal narrowing.    L3-L4: Posterior disc bulge and mild facet arthropathy without spinal canal stenosis or neural foraminal narrowing.    L4-L5: Annular fissure with posterior disc bulge and mild facet arthropathy.  No spinal canal stenosis or neural foraminal narrowing.    L5-S1: Mild facet arthropathy without spinal canal stenosis or neural narrowing.    Paraspinal muscles & soft tissues:  Unremarkable.      Impression       1. Mild lumbar spondylosis with annular fissure at L4-5.     Lab Results   Component Value Date    WBC 4.38 01/23/2015    HGB 14.4 01/23/2015    HCT 40.5 01/23/2015    MCV 86 01/23/2015     01/23/2015     CMP  Sodium   Date Value Ref Range Status   12/18/2017 139 136 - 145 mmol/L Final     Potassium   Date Value Ref Range Status   12/18/2017 4.3 3.5 - 5.1 mmol/L Final     Chloride   Date Value Ref Range Status   12/18/2017 105 95 - 110 mmol/L Final     CO2   Date Value Ref Range Status   12/18/2017 24 23 - 29 mmol/L Final     Glucose   Date Value Ref Range Status   12/18/2017 90 70 - 110 mg/dL Final     BUN, Bld   Date Value Ref Range Status   12/18/2017 20 6 - 20 mg/dL Final     Creatinine   Date Value Ref Range Status   12/18/2017 1.5 (H) 0.5 - 1.4 mg/dL Final     Calcium   Date Value Ref Range Status   12/18/2017 9.2 8.7 - 10.5 mg/dL Final     Total Protein   Date Value Ref Range Status   12/18/2017 7.1 6.0 - 8.4 g/dL Final     Albumin   Date Value Ref Range Status   12/18/2017 4.1 3.5 - 5.2 g/dL Final     Total Bilirubin   Date Value Ref Range Status   12/18/2017 0.7 0.1 - 1.0 mg/dL Final     Comment:     For infants and newborns, interpretation of results should be based  on gestational age, weight and in agreement with clinical  observations.  Premature Infant recommended reference ranges:  Up to 24 hours.............<8.0 mg/dL  Up to 48 hours............<12.0 mg/dL  3-5 days..................<15.0 mg/dL  6-29 days.................<15.0 mg/dL       Alkaline Phosphatase   Date Value Ref Range Status   12/18/2017 68 55 - 135 U/L Final     AST   Date Value Ref Range Status   12/18/2017 29 10 - 40 U/L Final     ALT   Date Value Ref Range Status   12/18/2017 36 10 - 44 U/L Final     Anion Gap   Date Value Ref Range Status   12/18/2017 10 8 - 16 mmol/L Final     eGFR if    Date Value Ref Range Status   12/18/2017 >60 >60 mL/min/1.73 m^2 Final     eGFR if non     Date Value Ref Range Status   12/18/2017 54 (A) >60 mL/min/1.73 m^2 Final     Comment:     Calculation used to obtain the estimated glomerular filtration  rate (eGFR) is the CKD-EPI equation.          Past Medical History:   Diagnosis Date    DJD (degenerative joint disease), lumbar      Past Surgical History:   Procedure Laterality Date    INJECTION OF ANESTHETIC AGENT AROUND NERVE Right 9/4/2019    Procedure: BLOCK, NERVE, L2,L3,L4,L5 MEDIAL BRANCH;  Surgeon: Jake Felder MD;  Location: [facility];  Service: Pain Management;  Laterality: Right;    RADIOFREQUENCY ABLATION Right 10/2/2019    Procedure: RADIOFREQUENCY ABLATION, RIGHT L2-L3-L4-L5 MEDIAL BRANCH;  Surgeon: Jake Felder MD;  Location: [facility];  Service: Pain Management;  Laterality: Right;     Social History     Socioeconomic History    Marital status:      Spouse name: Not on file    Number of children: Not on file    Years of education: Not on file    Highest education level: Not on file   Occupational History    Not on file   Social Needs    Financial resource strain: Not on file    Food insecurity:     Worry: Not on file     Inability: Not on file    Transportation needs:     Medical: Not on file     Non-medical: Not on file   Tobacco Use    Smoking status: Never Smoker    Smokeless tobacco: Never Used   Substance and Sexual Activity    Alcohol use: Yes    Drug use: No    Sexual activity: Not on file   Lifestyle    Physical activity:     Days per week: Not on file     Minutes per session: Not on file    Stress: Not on file   Relationships    Social connections:     Talks on phone: Not on file     Gets together: Not on file     Attends Judaism service: Not on file     Active member of club or organization: Not on file     Attends meetings of clubs or organizations: Not on file     Relationship status: Not on file   Other Topics Concern    Not on file   Social History Narrative     "Not on file     No family history on file.    Review of patient's allergies indicates:   Allergen Reactions    Penicillins Hives       Current Outpatient Medications   Medication Sig    ibuprofen (ADVIL,MOTRIN) 200 MG tablet Take 200 mg by mouth every 6 (six) hours as needed for Pain.    metaxalone (SKELAXIN) 800 MG tablet Take 1 tablet (800 mg total) by mouth 3 (three) times daily as needed for Pain.    methylPREDNISolone (MEDROL DOSEPACK) 4 mg tablet use as directed x 6 days. Do not take naproxen or motrin with this medication.    naproxen (NAPROSYN) 500 MG tablet Take 1 tablet (500 mg total) by mouth 2 (two) times daily with meals.     No current facility-administered medications for this visit.        REVIEW OF SYSTEMS:    GENERAL:  No weight loss, malaise or fevers.  HEENT:   No recent changes in vision or hearing  NECK:  Negative for lumps, no difficulty with swallowing.  RESPIRATORY:  Negative for cough, wheezing or shortness of breath, patient denies any recent URI.  CARDIOVASCULAR:  Negative for chest pain, leg swelling or palpitations.  GI:  Negative for abdominal discomfort, blood in stools or black stools or change in bowel habits.  MUSCULOSKELETAL:  See HPI.  SKIN:  Negative for lesions, rash, and itching.  PSYCH:  No mood disorder or recent psychosocial stressors.  Patients sleep is not disturbed secondary to pain.  HEMATOLOGY/LYMPHOLOGY:  Negative for prolonged bleeding, bruising easily or swollen nodes.  Patient is not currently taking any anti-coagulants  ENDO: No history of diabetes or thyroid dysfunction  NEURO:   No history of headaches, syncope, paralysis, seizures or tremors. tinnitus  All other reviewed and negative other than HPI.    OBJECTIVE:    BP (!) 160/100   Pulse 66   Temp 98.4 °F (36.9 °C)   Resp 18   Ht 6' 4" (1.93 m)   Wt 113.1 kg (249 lb 5.4 oz)   SpO2 100%   BMI 30.35 kg/m²     PHYSICAL EXAMINATION:    GENERAL: Well appearing, in no acute distress, alert and oriented " x3.  PSYCH:  Mood and affect appropriate.  SKIN: Skin color, texture, turgor normal, no rashes or lesions.  HEAD/FACE:  Normocephalic, atraumatic. Cranial nerves grossly intact.  CV: RRR with palpation of the radial artery.  PULM: No evidence of respiratory difficulty, symmetric chest rise.  GI:  Soft and non-tender.  BACK: Straight leg raising in the supine position is negative to radicular pain. There is mild pain to palpation over the facet joints of the lumbar spine on the right side.  Normal range of motion with pain on extension.  Mildly positive facet loading on the right side.  EXTREMITIES: Peripheral joint ROM is full and pain free without obvious instability or laxity in all four extremities. No deformities, edema, or skin discoloration. Good capillary refill.  MUSCULOSKELETAL: Mild TTP over both SI joints.  There is no pain with palpation over the sacroiliac joints bilaterally.  FABERs test is negative.  FADIRs test is negative.   Bilateral upper and lower extremity strength is normal and symmetric.  No atrophy or tone abnormalities are noted.  NEURO: Bilateral upper and lower extremity coordination and muscle stretch reflexes are physiologic and symmetric.  Plantar response are downgoing. No clonus.  No loss of sensation is noted.  GAIT: Normal.    ASSESSMENT: 49 y.o. year old male with right sided lower back pain, consistent with the following diagnoses:    Encounter Diagnoses   Name Primary?    Osteoarthritis of spine, unspecified spinal osteoarthritis complication status, unspecified spinal region Yes    Lumbar spondylosis     Other chronic pain        PLAN:     - I have stressed the importance of physical activity and a home exercise plan to help with pain and improve health.    - Patient can continue with medications for now since they are providing benefits, using them appropriately, and without side effects.    - He is s/p Right L2,3,4,5 RFA with benefit.  He is still having some muscle spasms.   He taking Skelaxin which helps.    - Start PT.    - RTC PRN.      The above plan and management options were discussed at length with patient. Patient is in agreement with the above and verbalized understanding.     LEA Dow  10/28/2019

## 2019-11-08 ENCOUNTER — CLINICAL SUPPORT (OUTPATIENT)
Dept: REHABILITATION | Facility: HOSPITAL | Age: 49
End: 2019-11-08
Payer: COMMERCIAL

## 2019-11-08 DIAGNOSIS — G89.29 OTHER CHRONIC PAIN: Primary | ICD-10-CM

## 2019-11-08 DIAGNOSIS — G89.29 CHRONIC BILATERAL LOW BACK PAIN WITHOUT SCIATICA: ICD-10-CM

## 2019-11-08 DIAGNOSIS — M62.81 MUSCLE WEAKNESS: ICD-10-CM

## 2019-11-08 DIAGNOSIS — M54.50 CHRONIC BILATERAL LOW BACK PAIN WITHOUT SCIATICA: ICD-10-CM

## 2019-11-08 PROCEDURE — 97161 PT EVAL LOW COMPLEX 20 MIN: CPT

## 2019-11-08 PROCEDURE — 97140 MANUAL THERAPY 1/> REGIONS: CPT

## 2019-11-08 PROCEDURE — 97112 NEUROMUSCULAR REEDUCATION: CPT

## 2019-11-08 NOTE — PROGRESS NOTES
"[facility]  Physical Therapy Initial Evaluation    Name: Jason Dutton  Clinic Number: [id number]    Therapy Diagnosis:   Encounter Diagnoses   Name Primary?    Other chronic pain Yes    Chronic bilateral low back pain without sciatica     Muscle weakness      Physician: Kim Price,*    Physician Orders: PT Eval and Treat   Medical Diagnosis from Referral:   M47.9 (ICD-10-CM) - Osteoarthritis of spine, unspecified spinal osteoarthritis complication status, unspecified spinal region   M47.816 (ICD-10-CM) - Lumbar spondylosis   G89.29 (ICD-10-CM) - Other chronic pain       Evaluation Date: 11/8/2019  Authorization Period Expiration: 12/31/2019  Plan of Care Expiration: 12/31/2019  Visit # / Visits authorized: 1/ 20    Time In: 2:00  Time Out: 3:00  Total Billable Time: 60 minutes    Precautions: Standard    Subjective   Date of onset: ablasion 4 weeks ago; back issues on/off 12 years   History of current condition - Jason reports: he's had back issues for years; he's done PT here several years ago and the [facility]; he was doing well until Feb 2019 when he had a bad back spasm and he wasn't right since, he had the radio abliation done 4 weeks ago. Prior to February he was working out - he was doing cardio 20-25 min, followed by seated press/flies/row, "not much" leg weights.   The last few weeks he's been trying to do sit ups and planks at home which makes him feel better towards the end of the reps.   He also feels like his (R) hip is off [pt indicates (R) PSIS]   Patient denies bowel/bladder changes       Past Medical History:   Diagnosis Date    DJD (degenerative joint disease), lumbar      Jason Dutton  has a past surgical history that includes Injection of anesthetic agent around nerve (Right, 9/4/2019) and Radiofrequency ablation (Right, 10/2/2019).    Jason has a current medication list which includes the following prescription(s): ibuprofen, metaxalone, and " "naproxen.    Review of patient's allergies indicates:   Allergen Reactions    Penicillins Hives        Imaging, MRI studies:   FINDINGS:  Alignment: Normal.    Vertebrae: L1 vertebral body hemangioma.  No diffuse marrow replacement process.  No fracture.    Discs: Disc desiccation and mild height loss at L3-4 and L4-5.    Cord: Normal.  Conus terminates at L1.    Degenerative findings:    T12-L1: No spinal canal stenosis or neural foraminal narrowing.    L1-L2: No spinal canal stenosis or neural foraminal narrowing.    L2-L3: No spinal canal stenosis or neural foraminal narrowing.    L3-L4: Posterior disc bulge and mild facet arthropathy without spinal canal stenosis or neural foraminal narrowing.    L4-L5: Annular fissure with posterior disc bulge and mild facet arthropathy.  No spinal canal stenosis or neural foraminal narrowing.    L5-S1: Mild facet arthropathy without spinal canal stenosis or neural narrowing.    Paraspinal muscles & soft tissues: Unremarkable.      Impression       1. Mild lumbar spondylosis with annular fissure at L4-5.         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   Aggravating Factors: Sitting longer than 5-10 minutes  - feels pain at (R) PSIS and "mild back Mm spasms"   Easing Factors: laying down; standing (up to a point)    Pts goals: get stronger    Objective     Observation: Pt is stated age, pleasant, no acute distress, oriented x3.   Posture: (R) iliac crest lower in standing  Gait: normal, efficient      Lumbar Range of Motion  Flex: 50%  Ext: 25%  RR: 80%  LR: " "80%    End Range Pain: flexion     Flexibility: decreased HS and hip rotator flexibility       Strength   Hip Left Right   Flexion 5/5 5/5   Extension 4/5 4/5   Abduction 4-/5 4-/5   External Rotation 4+/5 4+/5       Knee Left Right   Extension 5/5 5/5   Flexion 5/5 5/5       Special Tests: anterior rotation of (R) innominate       TREATMENT   Treatment Time In: 2:35  Treatment Time Out: 3:00  Total Treatment time separate from Evaluation: 25 minutes      Jason received the following manual therapy techniques: Joint mobilizations were applied to the: (R) innominate for 10 minutes, including:  - MET to address (R) anterior innominate rotation    Jason participated in neuromuscular re-education activities to improve: core stabilization for 15 minutes. The following activities were included:  - isometric hip abd/add 10" hold 2x10 each for HEP      Home Exercises and Patient Education Provided    Education provided:   - Findings of evaluation, prognosis, PT plan of care, HEP    Written Home Exercises Provided: see Neuro Re-edu above.  Exercises were reviewed and Jason was able to demonstrate them prior to the end of the session.  Jason demonstrated good  understanding of the education provided.       Assessment   Jason is a 49 y.o. male referred to outpatient Physical Therapy with a medical diagnosis of   M47.9 (ICD-10-CM) - Osteoarthritis of spine, unspecified spinal osteoarthritis complication status, unspecified spinal region   M47.816 (ICD-10-CM) - Lumbar spondylosis   G89.29 (ICD-10-CM) - Other chronic pain    Pt presents with decreased lumbar AROM, and decreased hip strength and flexibility due to chronic lower back pain leading to decreased activity level. Pt will benefit from skilled intervention to increase strength of core and hip Mm to allow for increased tolerance with ADL/IADL/recreational activity.     Pt prognosis is Good.   Pt will benefit from skilled outpatient Physical Therapy to address the deficits " stated above and in the chart below, provide pt/family education, and to maximize pt's level of independence.     Plan of care discussed with patient: Yes  Pt's spiritual, cultural and educational needs considered and patient is agreeable to the plan of care and goals as stated below:     Anticipated Barriers for therapy: Chronicity of condition.     Medical Necessity is demonstrated by the following  History  Co-morbidities and personal factors that may impact the plan of care Co-morbidities:   DJD; chronic LBP    Personal Factors:   age  lifestyle     low   Examination  Body Structures and Functions, activity limitations and participation restrictions that may impact the plan of care Body Regions:   lower extremities  trunk    Body Systems:    gross symmetry  ROM  strength  gross coordinated movement  transfers  motor control    Participation Restrictions:   Recreational activity, prolonged standing/walking, lifting/carrying    Activity limitations:   Learning and applying knowledge  no deficits    General Tasks and Commands  no deficits    Communication  no deficits    Mobility  Recreational activity, prolonged standing/walking, lifting/carrying    Self care  no deficits    Domestic Life  shopping  cooking  doing house work (cleaning house, washing dishes, laundry)  assisting others    Interactions/Relationships  no deficits    Life Areas  employment  basic economic transactions    Community and Social Life  community life  recreation and leisure         low   Clinical Presentation stable and uncomplicated low   Decision Making/ Complexity Score: low     Goals:  Short Term Goals: 2 weeks   - Pt will demonstrate excellent knowledge and adherence to HEP to facilitate optimal recovery.    Long Term Goals: 8 weeks   - Pt will demonstrate lumbar AROM pain-free and WFL for increased mobility tolerance   - Pt will demonstrate hip strength of 5/5 MMT for increased stability needed for functional activity   - Pt will  report ability to gradually resume recreational exercise without increase in lower back pain for improved health and wellness.     Plan   Plan of care Certification: 11/8/2019 to 12/31/2019.    Outpatient Physical Therapy 2 times weekly for 8 weeks to include the following interventions: Manual Therapy, Moist Heat/ Ice, Neuromuscular Re-ed, Patient Education, Therapeutic Activites and Therapeutic Exercise.     Monie Mayer, PT, DPT

## 2019-11-13 ENCOUNTER — CLINICAL SUPPORT (OUTPATIENT)
Dept: REHABILITATION | Facility: HOSPITAL | Age: 49
End: 2019-11-13
Payer: COMMERCIAL

## 2019-11-13 DIAGNOSIS — G89.29 OTHER CHRONIC PAIN: ICD-10-CM

## 2019-11-13 DIAGNOSIS — M54.50 CHRONIC BILATERAL LOW BACK PAIN WITHOUT SCIATICA: ICD-10-CM

## 2019-11-13 DIAGNOSIS — G89.29 CHRONIC BILATERAL LOW BACK PAIN WITHOUT SCIATICA: ICD-10-CM

## 2019-11-13 DIAGNOSIS — M62.81 MUSCLE WEAKNESS: ICD-10-CM

## 2019-11-13 DIAGNOSIS — S83.242A OTHER TEAR OF MEDIAL MENISCUS, CURRENT INJURY, LEFT KNEE, INITIAL ENCOUNTER: Primary | ICD-10-CM

## 2019-11-13 PROCEDURE — 97112 NEUROMUSCULAR REEDUCATION: CPT | Performed by: PHYSICAL THERAPIST

## 2019-11-13 PROCEDURE — 97140 MANUAL THERAPY 1/> REGIONS: CPT | Performed by: PHYSICAL THERAPIST

## 2019-11-13 NOTE — PROGRESS NOTES
Physical Therapy Daily Treatment Note     Name: Jason Dutton  Clinic Number: [id number]    Therapy Diagnosis:   Encounter Diagnoses   Name Primary?    Chronic bilateral low back pain without sciatica     Muscle weakness     Other chronic pain      Physician: Kim Price,*    Visit Date: 11/13/2019    Physician Orders: PT Eval and Treat   Medical Diagnosis from Referral:   M47.9 (ICD-10-CM) - Osteoarthritis of spine, unspecified spinal osteoarthritis complication status, unspecified spinal region   M47.816 (ICD-10-CM) - Lumbar spondylosis   G89.29 (ICD-10-CM) - Other chronic pain         Evaluation Date: 11/8/2019  Authorization Period Expiration: 12/31/2019  Plan of Care Expiration: 12/31/2019  Visit # / Visits authorized: 1/ 20      Time In: 1300  Time Out: 1400  Total Billable Time: 30 minutes    Precautions: Standard    Subjective     Pt reports: The ablation is starting to work well, less spasms in my spine.  He was compliant with home exercise program.  Response to previous treatment: Decrease LBP  Functional change: Less spasms     Pain: 3/10  Location: right back      Objective     Jason received therapeutic exercises to develop strength, endurance, ROM, flexibility, posture and core stabilization for 30 minutes including:  Bridges with low abd activation, 5 sec holds 20x  Sidelying clams BTB 2 x 15 B   Tee stretch B 30 sec 3x  SL hip abduction 15x B    Jason received the following manual therapy techniques: Joint mobilizations and Soft tissue Mobilization were applied to the: Lumbar spine for 15 minutes, including:  Breaking bread on R L2-L5  Opening mobilization L2-L3  Pelvis check- patient pain free to ASIS bilaterally with equal leg length    Jason participated in neuromuscular re-education activities to improve: Balance, Coordination, Sense and Proprioception for 15 minutes. The following activities were included:  BKFO and marching with blood pressure feedback    Home Exercises Provided  and Patient Education Provided     Education provided:   - Importance of strengthening low abdominals, gluts and stretching hip flexors    Written Home Exercises Provided: Patient instructed to cont prior HEP.  Exercises were reviewed and Jason was able to demonstrate them prior to the end of the session.  Jason demonstrated good  understanding of the education provided.     See EMR under Patient Instructions for exercises provided prior visit.    Assessment     Jason did well with glut strengthening today. He has anterior pelvic tilt causing tight hip flexors and long/weak gluts. His pelvis was found in neutral without pain today. He was educated throughout session as he considers a left knee meniscectomy. He was given a new HEP to begin.   Jason is progressing well towards his goals.   Pt prognosis is Excellent.     Pt will continue to benefit from skilled outpatient physical therapy to address the deficits listed in the problem list box on initial evaluation, provide pt/family education and to maximize pt's level of independence in the home and community environment.     Pt's spiritual, cultural and educational needs considered and pt agreeable to plan of care and goals.     Anticipated barriers to physical therapy: Work schedule     Progressing, has not met his goals    Goals:  Short Term Goals: 2 weeks   - Pt will demonstrate excellent knowledge and adherence to HEP to facilitate optimal recovery.     Long Term Goals: 8 weeks   - Pt will demonstrate lumbar AROM pain-free and WFL for increased mobility tolerance   - Pt will demonstrate hip strength of 5/5 MMT for increased stability needed for functional activity   - Pt will report ability to gradually resume recreational exercise without increase in lower back pain for improved health and wellness.     Plan     Plan of care Certification: 11/8/2019 to 12/31/2019.     Outpatient Physical Therapy 2 times weekly for 8 weeks to include the following interventions:  Manual Therapy, Moist Heat/ Ice, Neuromuscular Re-ed, Patient Education, Therapeutic Activites and Therapeutic Exercise.     Glut strengthening and hip flexor stretching, stabilization to the multifidi    Bon Falgoust, PT

## 2019-11-22 ENCOUNTER — CLINICAL SUPPORT (OUTPATIENT)
Dept: REHABILITATION | Facility: HOSPITAL | Age: 49
End: 2019-11-22
Payer: COMMERCIAL

## 2019-11-22 DIAGNOSIS — M54.50 CHRONIC BILATERAL LOW BACK PAIN WITHOUT SCIATICA: ICD-10-CM

## 2019-11-22 DIAGNOSIS — M62.81 MUSCLE WEAKNESS: ICD-10-CM

## 2019-11-22 DIAGNOSIS — G89.29 CHRONIC BILATERAL LOW BACK PAIN WITHOUT SCIATICA: ICD-10-CM

## 2019-11-22 DIAGNOSIS — G89.29 OTHER CHRONIC PAIN: ICD-10-CM

## 2019-11-22 PROCEDURE — 97112 NEUROMUSCULAR REEDUCATION: CPT

## 2019-11-22 PROCEDURE — 97110 THERAPEUTIC EXERCISES: CPT

## 2019-11-25 ENCOUNTER — CLINICAL SUPPORT (OUTPATIENT)
Dept: REHABILITATION | Facility: HOSPITAL | Age: 49
End: 2019-11-25
Payer: COMMERCIAL

## 2019-11-25 DIAGNOSIS — M54.50 CHRONIC BILATERAL LOW BACK PAIN WITHOUT SCIATICA: ICD-10-CM

## 2019-11-25 DIAGNOSIS — G89.29 OTHER CHRONIC PAIN: ICD-10-CM

## 2019-11-25 DIAGNOSIS — M62.81 MUSCLE WEAKNESS: ICD-10-CM

## 2019-11-25 DIAGNOSIS — G89.29 CHRONIC BILATERAL LOW BACK PAIN WITHOUT SCIATICA: ICD-10-CM

## 2019-11-25 PROCEDURE — 97110 THERAPEUTIC EXERCISES: CPT

## 2019-11-25 NOTE — PROGRESS NOTES
"  Physical Therapy Daily Treatment Note     Name: Jason Dutton  Clinic Number: [id number]    Therapy Diagnosis:   Encounter Diagnoses   Name Primary?    Chronic bilateral low back pain without sciatica     Muscle weakness     Other chronic pain      Physician: Kim Price,*    Visit Date: 11/25/2019    Physician Orders: PT Eval and Treat   Medical Diagnosis from Referral:   M47.9 (ICD-10-CM) - Osteoarthritis of spine, unspecified spinal osteoarthritis complication status, unspecified spinal region   M47.816 (ICD-10-CM) - Lumbar spondylosis   G89.29 (ICD-10-CM) - Other chronic pain         Evaluation Date: 11/8/2019  Authorization Period Expiration: 12/31/2019  Plan of Care Expiration: 12/31/2019  Visit # / Visits authorized: 4/ 20      Time In: 4:30  Time Out: 5:00  Total Billable Time: 23 minutes    Precautions: Standard    Subjective   Pt reports: he was able to workout this weekend and he felt well.   He has had intermittent (L) knee pain, swelling with increased activity (squats, running). He has an MRI stating he has a meniscal tear but he's not sure if he should have surgery.   He was compliant with home exercise program.  Response to previous treatment: Decrease LBP  Functional change: Less spasms     Pain: 3/10  Location: right back      Objective   Update 11/25/2019:   (L) knee AROM: 5/0/145  (R) knee AROM: 8/0/146    (L) knee strength 5/5    Jason received therapeutic exercises to develop strength, endurance, ROM, flexibility, posture and core stabilization for 23 minutes including:  - DL leg press 120# 2x10, 140# 2x10   - supine SLR flexion 3" hold 3x10    Not performed:   Bridges with low abd activation, 5 sec holds 20x  Sidelying clams BTB 2 x 15 B   Tee stretch B 30 sec 3x  SL hip abduction 15x B    Jason received the following manual therapy techniques: Joint mobilizations and Soft tissue Mobilization were applied to the: Lumbar spine for 00 minutes, including:  Breaking bread on R " "L2-L5  Opening mobilization L2-L3  Pelvis check- patient pain free to ASIS bilaterally with equal leg length    Jason participated in neuromuscular re-education activities to improve: Balance, Coordination, Sense and Proprioception for 00 minutes. The following activities were included:  - multifidus walk-outs/press-outs BTB 30x each - NP  - supine SLR flexion 3" hold/30x  - discussion of HEP update: quad sets (75x/day, 10-20 into full hyperextension).     Home Exercises Provided and Patient Education Provided     Education provided:   - Importance of strengthening low abdominals, gluts and stretching hip flexors    Written Home Exercises Provided: Patient instructed to cont prior HEP.  Exercises were reviewed and Jason was able to demonstrate them prior to the end of the session.  Jason demonstrated good  understanding of the education provided.     See EMR under Patient Instructions for exercises provided prior visit.    Assessment   Session abbreviated due to late arrival.   Pelvic alignment was found in neutral without pain today. Assessment of (L) knee reveals end-ROM deficits. Discussed conservative treatment for knee to increase functional tolerance, afterwards pt can discuss further (possibly surgical) options with surgeon regarding MRI-confirmed meniscal tear.     Jason is progressing well towards his goals.   Pt prognosis is Excellent.     Pt will continue to benefit from skilled outpatient physical therapy to address the deficits listed in the problem list box on initial evaluation, provide pt/family education and to maximize pt's level of independence in the home and community environment.     Pt's spiritual, cultural and educational needs considered and pt agreeable to plan of care and goals.     Anticipated barriers to physical therapy: Work schedule     Progressing, has not met his goals    Goals:  Short Term Goals: 2 weeks   - Pt will demonstrate excellent knowledge and adherence to HEP to facilitate " optimal recovery. (PROGRESSING)      Long Term Goals: 8 weeks   - Pt will demonstrate lumbar AROM pain-free and WFL for increased mobility tolerance (PROGRESSING)   - Pt will demonstrate hip strength of 5/5 MMT for increased stability needed for functional activity (PROGRESSING)   - Pt will report ability to gradually resume recreational exercise without increase in lower back pain for improved health and wellness. (PROGRESSING)     Plan     Plan of care Certification: 11/8/2019 to 12/31/2019.     Outpatient Physical Therapy 2 times weekly for 8 weeks to include the following interventions: Manual Therapy, Moist Heat/ Ice, Neuromuscular Re-ed, Patient Education, Therapeutic Activites and Therapeutic Exercise.     Glut strengthening and hip flexor stretching, stabilization to the multifidi    Monie Mayer, PT, DPT

## 2019-11-25 NOTE — PROGRESS NOTES
Physical Therapy Daily Treatment Note     Name: Jason Dutton  Clinic Number: [id number]    Therapy Diagnosis:   Encounter Diagnoses   Name Primary?    Chronic bilateral low back pain without sciatica     Muscle weakness     Other chronic pain      Physician: Kim Price,*    Visit Date: 11/22/2019    Physician Orders: PT Eval and Treat   Medical Diagnosis from Referral:   M47.9 (ICD-10-CM) - Osteoarthritis of spine, unspecified spinal osteoarthritis complication status, unspecified spinal region   M47.816 (ICD-10-CM) - Lumbar spondylosis   G89.29 (ICD-10-CM) - Other chronic pain         Evaluation Date: 11/8/2019  Authorization Period Expiration: 12/31/2019  Plan of Care Expiration: 12/31/2019  Visit # / Visits authorized: 3/ 20      Time In: 1115  Time Out: 1200  Total Billable Time: 31 minutes    Precautions: Standard    Subjective     Pt reports: The ablation is starting to work well, less spasms in my spine.  He was compliant with home exercise program.  Response to previous treatment: Decrease LBP  Functional change: Less spasms     Pain: 3/10  Location: right back      Objective     Jason received therapeutic exercises to develop strength, endurance, ROM, flexibility, posture and core stabilization for 23 minutes including:  Bridges with low abd activation, 5 sec holds 20x  Sidelying clams BTB 2 x 15 B   Tee stretch B 30 sec 3x  SL hip abduction 15x B    Jason received the following manual therapy techniques: Joint mobilizations and Soft tissue Mobilization were applied to the: Lumbar spine for 00 minutes, including:  Breaking bread on R L2-L5  Opening mobilization L2-L3  Pelvis check- patient pain free to ASIS bilaterally with equal leg length    Jason participated in neuromuscular re-education activities to improve: Balance, Coordination, Sense and Proprioception for 8 minutes. The following activities were included:  - multifidus walk-outs/press-outs BTB 30x each     Home Exercises Provided  and Patient Education Provided     Education provided:   - Importance of strengthening low abdominals, gluts and stretching hip flexors    Written Home Exercises Provided: Patient instructed to cont prior HEP.  Exercises were reviewed and Jason was able to demonstrate them prior to the end of the session.  Jason demonstrated good  understanding of the education provided.     See EMR under Patient Instructions for exercises provided prior visit.    Assessment     Pelvic alignment was found in neutral without pain today. Continued with hip and multifidus strengthening for improved core stabilization needed with functional activity. Reviewed HEP. Patient reported feeling well upon departure.     Jason is progressing well towards his goals.   Pt prognosis is Excellent.     Pt will continue to benefit from skilled outpatient physical therapy to address the deficits listed in the problem list box on initial evaluation, provide pt/family education and to maximize pt's level of independence in the home and community environment.     Pt's spiritual, cultural and educational needs considered and pt agreeable to plan of care and goals.     Anticipated barriers to physical therapy: Work schedule     Progressing, has not met his goals    Goals:  Short Term Goals: 2 weeks   - Pt will demonstrate excellent knowledge and adherence to HEP to facilitate optimal recovery. (PROGRESSING)      Long Term Goals: 8 weeks   - Pt will demonstrate lumbar AROM pain-free and WFL for increased mobility tolerance (PROGRESSING)   - Pt will demonstrate hip strength of 5/5 MMT for increased stability needed for functional activity (PROGRESSING)   - Pt will report ability to gradually resume recreational exercise without increase in lower back pain for improved health and wellness. (PROGRESSING)     Plan     Plan of care Certification: 11/8/2019 to 12/31/2019.     Outpatient Physical Therapy 2 times weekly for 8 weeks to include the following  interventions: Manual Therapy, Moist Heat/ Ice, Neuromuscular Re-ed, Patient Education, Therapeutic Activites and Therapeutic Exercise.     Glut strengthening and hip flexor stretching, stabilization to the multifidi    Monie Mayer, PT, DPT

## 2019-12-06 ENCOUNTER — CLINICAL SUPPORT (OUTPATIENT)
Dept: REHABILITATION | Facility: HOSPITAL | Age: 49
End: 2019-12-06
Payer: COMMERCIAL

## 2019-12-06 DIAGNOSIS — G89.29 OTHER CHRONIC PAIN: ICD-10-CM

## 2019-12-06 DIAGNOSIS — M62.81 MUSCLE WEAKNESS: ICD-10-CM

## 2019-12-06 DIAGNOSIS — M54.50 CHRONIC BILATERAL LOW BACK PAIN WITHOUT SCIATICA: ICD-10-CM

## 2019-12-06 DIAGNOSIS — G89.29 CHRONIC BILATERAL LOW BACK PAIN WITHOUT SCIATICA: ICD-10-CM

## 2019-12-06 PROCEDURE — 97112 NEUROMUSCULAR REEDUCATION: CPT

## 2019-12-06 PROCEDURE — 97110 THERAPEUTIC EXERCISES: CPT

## 2019-12-06 NOTE — PROGRESS NOTES
"  Physical Therapy Daily Treatment Note     Name: Jason Dutton  Clinic Number: [id number]    Therapy Diagnosis:   Encounter Diagnoses   Name Primary?    Chronic bilateral low back pain without sciatica     Muscle weakness     Other chronic pain      Physician: Kim Price,*    Visit Date: 12/6/2019    Physician Orders: PT Eval and Treat   Medical Diagnosis from Referral:   M47.9 (ICD-10-CM) - Osteoarthritis of spine, unspecified spinal osteoarthritis complication status, unspecified spinal region   M47.816 (ICD-10-CM) - Lumbar spondylosis   G89.29 (ICD-10-CM) - Other chronic pain         Evaluation Date: 11/8/2019  Authorization Period Expiration: 12/31/2019  Plan of Care Expiration: 12/31/2019  Visit # / Visits authorized: 6/ 20      Time In: 11:10  Time Out: 12:00  Total Billable Time: 31 minutes    Precautions: Standard    Subjective   Pt reports: he is feeling well overall, no new issues to report.   He was compliant with home exercise program.  Response to previous treatment: Decrease LBP  Functional change: Less pain with activity      Pain: 0/10  Location: right back      Objective     Jason received therapeutic exercises to develop strength, endurance, ROM, flexibility, posture and core stabilization for 23 minutes including:  - elliptical x10 min   - DL leg press 120# 2x10, 140# 2x10   - supine SLR flexion 3" hold 3x10    Bridges with low abd activation, 5 sec holds 20x  Sidelying clams BTB 2 x 15 B   SL hip abduction 15x B    Jason received the following manual therapy techniques: Joint mobilizations and Soft tissue Mobilization were applied to the: Lumbar spine for 00 minutes, including:  Breaking bread on R L2-L5  Opening mobilization L2-L3  Pelvis check- patient pain free to ASIS bilaterally with equal leg length    Jason participated in neuromuscular re-education activities to improve: Balance, Coordination, Sense and Proprioception for 8 minutes. The following activities were included:  - " "multifidus walk-outs/press-outs BTB 30x each - NP  - supine SLR flexion 3" hold/30x -NP  - discussion of HEP update: quad sets (75x/day, 10-20 into full hyperextension).     Home Exercises Provided and Patient Education Provided     Education provided:   - Importance of strengthening low abdominals, gluts and stretching hip flexors    Written Home Exercises Provided: Patient instructed to cont prior HEP.  Exercises were reviewed and Jason was able to demonstrate them prior to the end of the session.  Jason demonstrated good  understanding of the education provided.     See EMR under Patient Instructions for exercises provided prior visit.    Assessment   Continued strengthening for hip, core, and knee Mm without symptom provocation. Improving endurance noted with exercises. Min VC required for form.   Patient reported feeling well upon departure.       Jason is progressing well towards his goals.   Pt prognosis is Excellent.     Pt will continue to benefit from skilled outpatient physical therapy to address the deficits listed in the problem list box on initial evaluation, provide pt/family education and to maximize pt's level of independence in the home and community environment.     Pt's spiritual, cultural and educational needs considered and pt agreeable to plan of care and goals.     Anticipated barriers to physical therapy: Work schedule     Progressing, has not met his goals    Goals:  Short Term Goals: 2 weeks   - Pt will demonstrate excellent knowledge and adherence to HEP to facilitate optimal recovery. (PROGRESSING)      Long Term Goals: 8 weeks   - Pt will demonstrate lumbar AROM pain-free and WFL for increased mobility tolerance (PROGRESSING)   - Pt will demonstrate hip strength of 5/5 MMT for increased stability needed for functional activity (PROGRESSING)   - Pt will report ability to gradually resume recreational exercise without increase in lower back pain for improved health and wellness. " (PROGRESSING)     Plan     Plan of care Certification: 11/8/2019 to 12/31/2019.     Outpatient Physical Therapy 2 times weekly for 8 weeks to include the following interventions: Manual Therapy, Moist Heat/ Ice, Neuromuscular Re-ed, Patient Education, Therapeutic Activites and Therapeutic Exercise.     Glut strengthening and hip flexor stretching, stabilization to the multifidi    Monie Mayer, PT, DPT

## 2019-12-09 ENCOUNTER — CLINICAL SUPPORT (OUTPATIENT)
Dept: REHABILITATION | Facility: HOSPITAL | Age: 49
End: 2019-12-09
Payer: COMMERCIAL

## 2019-12-09 DIAGNOSIS — G89.29 CHRONIC BILATERAL LOW BACK PAIN WITHOUT SCIATICA: ICD-10-CM

## 2019-12-09 DIAGNOSIS — M54.50 CHRONIC BILATERAL LOW BACK PAIN WITHOUT SCIATICA: ICD-10-CM

## 2019-12-09 DIAGNOSIS — G89.29 OTHER CHRONIC PAIN: ICD-10-CM

## 2019-12-09 DIAGNOSIS — M62.81 MUSCLE WEAKNESS: ICD-10-CM

## 2019-12-09 PROCEDURE — 97112 NEUROMUSCULAR REEDUCATION: CPT

## 2019-12-09 PROCEDURE — 97110 THERAPEUTIC EXERCISES: CPT

## 2019-12-13 ENCOUNTER — CLINICAL SUPPORT (OUTPATIENT)
Dept: REHABILITATION | Facility: HOSPITAL | Age: 49
End: 2019-12-13
Payer: COMMERCIAL

## 2019-12-13 DIAGNOSIS — G89.29 CHRONIC BILATERAL LOW BACK PAIN WITHOUT SCIATICA: ICD-10-CM

## 2019-12-13 DIAGNOSIS — M54.50 CHRONIC BILATERAL LOW BACK PAIN WITHOUT SCIATICA: ICD-10-CM

## 2019-12-13 DIAGNOSIS — G89.29 OTHER CHRONIC PAIN: ICD-10-CM

## 2019-12-13 DIAGNOSIS — M62.81 MUSCLE WEAKNESS: ICD-10-CM

## 2019-12-13 PROCEDURE — 97110 THERAPEUTIC EXERCISES: CPT

## 2019-12-13 PROCEDURE — 97112 NEUROMUSCULAR REEDUCATION: CPT

## 2019-12-13 NOTE — PROGRESS NOTES
"  Physical Therapy Daily Treatment Note     Name: Jason Dutton  Clinic Number: [id number]    Therapy Diagnosis:   Encounter Diagnoses   Name Primary?    Chronic bilateral low back pain without sciatica     Muscle weakness     Other chronic pain      Physician: Kim Price,*    Visit Date: 12/9/2019    Physician Orders: PT Eval and Treat   Medical Diagnosis from Referral:   M47.9 (ICD-10-CM) - Osteoarthritis of spine, unspecified spinal osteoarthritis complication status, unspecified spinal region   M47.816 (ICD-10-CM) - Lumbar spondylosis   G89.29 (ICD-10-CM) - Other chronic pain         Evaluation Date: 11/8/2019  Authorization Period Expiration: 12/31/2019  Plan of Care Expiration: 12/31/2019  Visit # / Visits authorized: 7/ 20      Time In: 11:10  Time Out: 12:00  Total Billable Time: 31 minutes    Precautions: Standard    Subjective   Pt reports: he is feeling well overall, no new issues to report.   He was compliant with home exercise program.  Response to previous treatment: Decrease LBP  Functional change: Less pain with activity      Pain: 0/10  Location: right back      Objective     Jason received therapeutic exercises to develop strength, endurance, ROM, flexibility, posture and core stabilization for 23 minutes including:  - elliptical x10 min   - DL leg press 120# 2x10, 140# 2x10   - supine SLR flexion 3" hold 3x10    Bridges with low abd activation, 5 sec holds 20x  Sidelying clams BTB 2 x 15 B   SL hip abduction 15x B    Jason received the following manual therapy techniques: Joint mobilizations and Soft tissue Mobilization were applied to the: Lumbar spine for 00 minutes, including:  Breaking bread on R L2-L5  Opening mobilization L2-L3  Pelvis check- patient pain free to ASIS bilaterally with equal leg length    Jason participated in neuromuscular re-education activities to improve: Balance, Coordination, Sense and Proprioception for 8 minutes. The following activities were included:  - " "multifidus walk-outs/press-outs BTB 30x each - NP  - supine SLR flexion 3" hold/30x -NP  - discussion of HEP update: quad sets (75x/day, 10-20 into full hyperextension).     Home Exercises Provided and Patient Education Provided     Education provided:   - Importance of strengthening low abdominals, gluts and stretching hip flexors    Written Home Exercises Provided: Patient instructed to cont prior HEP.  Exercises were reviewed and Jason was able to demonstrate them prior to the end of the session.  Jason demonstrated good  understanding of the education provided.     See EMR under Patient Instructions for exercises provided prior visit.    Assessment   Continued strengthening for hip, core, and knee Mm without symptom provocation. Improving endurance noted with exercises. Min VC required for form.   Patient reported feeling well upon departure.       Jason is progressing well towards his goals.   Pt prognosis is Excellent.     Pt will continue to benefit from skilled outpatient physical therapy to address the deficits listed in the problem list box on initial evaluation, provide pt/family education and to maximize pt's level of independence in the home and community environment.     Pt's spiritual, cultural and educational needs considered and pt agreeable to plan of care and goals.     Anticipated barriers to physical therapy: Work schedule     Progressing, has not met his goals    Goals:  Short Term Goals: 2 weeks   - Pt will demonstrate excellent knowledge and adherence to HEP to facilitate optimal recovery. (PROGRESSING)      Long Term Goals: 8 weeks   - Pt will demonstrate lumbar AROM pain-free and WFL for increased mobility tolerance (PROGRESSING)   - Pt will demonstrate hip strength of 5/5 MMT for increased stability needed for functional activity (PROGRESSING)   - Pt will report ability to gradually resume recreational exercise without increase in lower back pain for improved health and wellness. " (PROGRESSING)     Plan     Plan of care Certification: 11/8/2019 to 12/31/2019.     Outpatient Physical Therapy 2 times weekly for 8 weeks to include the following interventions: Manual Therapy, Moist Heat/ Ice, Neuromuscular Re-ed, Patient Education, Therapeutic Activites and Therapeutic Exercise.     Glut strengthening and hip flexor stretching, stabilization to the multifidi    Monie Mayer, PT, DPT

## 2019-12-13 NOTE — PROGRESS NOTES
"  Physical Therapy Daily Treatment Note     Name: Jason Dutton  Clinic Number: [id number]    Therapy Diagnosis:   Encounter Diagnoses   Name Primary?    Chronic bilateral low back pain without sciatica     Muscle weakness     Other chronic pain      Physician: Kim Price,*    Visit Date: 12/13/2019    Physician Orders: PT Eval and Treat   Medical Diagnosis from Referral:   M47.9 (ICD-10-CM) - Osteoarthritis of spine, unspecified spinal osteoarthritis complication status, unspecified spinal region   M47.816 (ICD-10-CM) - Lumbar spondylosis   G89.29 (ICD-10-CM) - Other chronic pain         Evaluation Date: 11/8/2019  Authorization Period Expiration: 12/31/2019  Plan of Care Expiration: 12/31/2019  Visit # / Visits authorized: 8/ 20      Time In: 10:00  Time Out: 11:05  Total Billable Time: 31 minutes    Precautions: Standard    Subjective   Pt reports: he is feeling well overall, no new issues to report.   He was compliant with home exercise program.  Response to previous treatment: Decrease LBP  Functional change: Less pain with activity      Pain: 0/10  Location: right back      Objective     Jason received therapeutic exercises to develop strength, endurance, ROM, flexibility, posture and core stabilization for 23 minutes including:  - elliptical x10 min   - DL leg press 120# 2x10, 140# 2x10   - supine SLR flexion 3" hold 3x10    Bridges with low abd activation, 5 sec holds 20x  Sidelying clams BTB 2 x 15 B   SL hip abduction 15x B    Jason received the following manual therapy techniques: Joint mobilizations and Soft tissue Mobilization were applied to the: Lumbar spine for 00 minutes, including:  Breaking bread on R L2-L5  Opening mobilization L2-L3  Pelvis check- patient pain free to ASIS bilaterally with equal leg length    Jason participated in neuromuscular re-education activities to improve: Balance, Coordination, Sense and Proprioception for 8 minutes. The following activities were included:  - " "multifidus walk-outs/press-outs BTB 30x each - NP  - supine SLR flexion 3" hold/30x -NP  - discussion of HEP update: quad sets (75x/day, 10-20 into full hyperextension).     Home Exercises Provided and Patient Education Provided     Education provided:   - Importance of strengthening low abdominals, gluts and stretching hip flexors    Written Home Exercises Provided: Patient instructed to cont prior HEP.  Exercises were reviewed and Jason was able to demonstrate them prior to the end of the session.  Jason demonstrated good  understanding of the education provided.     See EMR under Patient Instructions for exercises provided prior visit.    Assessment     Continued strengthening for hip, core, and knee Mm without symptom provocation. Improving endurance noted with exercises. Discussed D/C to independent HEP, instructing patient that he can return to clinic if he has any concerns or increased symptoms; pt agreed with this plan.       Jason is progressing well towards his goals.   Pt prognosis is Excellent.     Pt will continue to benefit from skilled outpatient physical therapy to address the deficits listed in the problem list box on initial evaluation, provide pt/family education and to maximize pt's level of independence in the home and community environment.     Pt's spiritual, cultural and educational needs considered and pt agreeable to plan of care and goals.     Anticipated barriers to physical therapy: Work schedule     Progressing, has not met his goals    Goals:  Short Term Goals: 2 weeks   - Pt will demonstrate excellent knowledge and adherence to HEP to facilitate optimal recovery. (ACHIEVED)      Long Term Goals: 8 weeks   - Pt will demonstrate lumbar AROM pain-free and WFL for increased mobility tolerance (ACHIEVED)   - Pt will demonstrate hip strength of 5/5 MMT for increased stability needed for functional activity (PROGRESSING)   - Pt will report ability to gradually resume recreational exercise " without increase in lower back pain for improved health and wellness. (ACHIEVED)     Plan     Patient is appropriate to be discharged and continue with independent HEP at this time. Patient instructed to contact PT with any questions or concerns following discharge.      Monie Mayer, PT, DPT

## 2019-12-26 DIAGNOSIS — M54.50 CHRONIC RIGHT-SIDED LOW BACK PAIN WITHOUT SCIATICA: ICD-10-CM

## 2019-12-26 DIAGNOSIS — G89.29 CHRONIC RIGHT-SIDED LOW BACK PAIN WITHOUT SCIATICA: ICD-10-CM

## 2019-12-26 DIAGNOSIS — M51.37 DDD (DEGENERATIVE DISC DISEASE), LUMBOSACRAL: ICD-10-CM

## 2019-12-26 DIAGNOSIS — M47.816 SPONDYLOSIS OF LUMBAR REGION WITHOUT MYELOPATHY OR RADICULOPATHY: ICD-10-CM

## 2019-12-26 DIAGNOSIS — M79.18 MYOFASCIAL MUSCLE PAIN: ICD-10-CM

## 2019-12-26 RX ORDER — NAPROXEN 500 MG/1
TABLET ORAL
Qty: 60 TABLET | Refills: 2 | Status: SHIPPED | OUTPATIENT
Start: 2019-12-26 | End: 2020-07-14 | Stop reason: SDUPTHER

## 2020-02-07 ENCOUNTER — PATIENT MESSAGE (OUTPATIENT)
Dept: ADMINISTRATIVE | Facility: HOSPITAL | Age: 50
End: 2020-02-07

## 2020-03-02 ENCOUNTER — PATIENT MESSAGE (OUTPATIENT)
Dept: ORTHOPEDICS | Facility: CLINIC | Age: 50
End: 2020-03-02

## 2020-03-02 DIAGNOSIS — M51.26 LUMBAR DISCOGENIC PAIN SYNDROME: ICD-10-CM

## 2020-03-02 DIAGNOSIS — M47.816 LUMBAR SPONDYLOSIS: Primary | ICD-10-CM

## 2020-07-13 ENCOUNTER — TELEPHONE (OUTPATIENT)
Dept: PAIN MEDICINE | Facility: CLINIC | Age: 50
End: 2020-07-13

## 2020-07-13 NOTE — TELEPHONE ENCOUNTER
Staff spoke with patient to confirm my chart virtual video scheduled 7/14/20 at 8:20 am with Steffen Vasquez patient was informed to login 15 minutes before the visit and also given tech support number patient verbalized understanding.

## 2020-07-14 ENCOUNTER — OFFICE VISIT (OUTPATIENT)
Dept: PAIN MEDICINE | Facility: CLINIC | Age: 50
End: 2020-07-14
Payer: COMMERCIAL

## 2020-07-14 DIAGNOSIS — M47.816 LUMBAR SPONDYLOSIS: ICD-10-CM

## 2020-07-14 DIAGNOSIS — M54.50 CHRONIC RIGHT-SIDED LOW BACK PAIN WITHOUT SCIATICA: ICD-10-CM

## 2020-07-14 DIAGNOSIS — G89.29 CHRONIC RIGHT-SIDED LOW BACK PAIN WITHOUT SCIATICA: ICD-10-CM

## 2020-07-14 DIAGNOSIS — M47.816 OSTEOARTHRITIS OF LUMBAR SPINE, UNSPECIFIED SPINAL OSTEOARTHRITIS COMPLICATION STATUS: Primary | ICD-10-CM

## 2020-07-14 DIAGNOSIS — M47.816 SPONDYLOSIS OF LUMBAR REGION WITHOUT MYELOPATHY OR RADICULOPATHY: ICD-10-CM

## 2020-07-14 DIAGNOSIS — M79.18 MYOFASCIAL PAIN: ICD-10-CM

## 2020-07-14 DIAGNOSIS — M79.18 MYOFASCIAL MUSCLE PAIN: ICD-10-CM

## 2020-07-14 DIAGNOSIS — M51.37 DDD (DEGENERATIVE DISC DISEASE), LUMBOSACRAL: ICD-10-CM

## 2020-07-14 PROCEDURE — 99213 PR OFFICE/OUTPT VISIT, EST, LEVL III, 20-29 MIN: ICD-10-PCS | Mod: 95,,, | Performed by: NURSE PRACTITIONER

## 2020-07-14 PROCEDURE — 99213 OFFICE O/P EST LOW 20 MIN: CPT | Mod: 95,,, | Performed by: NURSE PRACTITIONER

## 2020-07-14 RX ORDER — NAPROXEN 500 MG/1
500 TABLET ORAL 2 TIMES DAILY WITH MEALS
Qty: 60 TABLET | Refills: 2 | Status: SHIPPED | OUTPATIENT
Start: 2020-07-14 | End: 2020-11-30 | Stop reason: SDUPTHER

## 2020-07-14 RX ORDER — METAXALONE 800 MG/1
800 TABLET ORAL 3 TIMES DAILY PRN
Qty: 270 TABLET | Refills: 2 | Status: SHIPPED | OUTPATIENT
Start: 2020-07-14 | End: 2020-11-30 | Stop reason: SDUPTHER

## 2020-07-14 RX ORDER — DICLOFENAC SODIUM 10 MG/G
2 GEL TOPICAL 4 TIMES DAILY
Qty: 1 TUBE | Refills: 2 | Status: SHIPPED | OUTPATIENT
Start: 2020-07-14 | End: 2020-11-30 | Stop reason: SDUPTHER

## 2020-07-14 NOTE — PROGRESS NOTES
Chronic Pain-Tele-Medicine-Established Note (Follow up visit)        The patient location is: Home  The chief complaint leading to consultation is: pain  Visit type: Virtual visit with synchronous audio and video  Total time spent with patient: 16 min  Each patient to whom he or she provides medical services by telemedicine is:  (1) informed of the relationship between the physician and patient and the respective role of any other health care provider with respect to management of the patient; and (2) notified that he or she may decline to receive medical services by telemedicine and may withdraw from such care at any time.      Referring Physician: No ref. provider found    Chief Complaint: No chief complaint on file.       SUBJECTIVE: Disclaimer: This note has been generated using voice-recognition software. There may be typographical errors that have been missed during proof-reading    Interval History 7/14/2020:  The patient presents for follow-up of lower back pain.  Patient has had gradual increase of right lower back pain over past several weeks.  He has had prior RFA to left than right lower lumbar lasting for over 9 months.  The right side is primary generator.  He is taking Skelaxin, naproxen and will be going on a trip for daughter's wedding this Friday.  He voices concern as he is having to sit for long periods in plane. The patient denies myelopathic symptoms such as handwriting changes or difficulty with buttons/coins/keys. Denies perineal paresthesias, bowel/bladder dysfunction. He will be restarting healthy back after trip. Rates pain 5/10.       Interval History 10/28/2019:  The patient is here for follow up of right lower back pain.  He is now s/p right L2,3,4,5 RFA.  He is reporting about 60% relief.  He still has some intermittent muscle spasms on the right.  He has some benefit with Skelaxin.  He previously had benefit with [facility] and would like to repeat PT.  His pain today is  "5/10.    Interval History 9/16/2019:  The patient returns for follow up of right sided back pain.  He is s/p right L2,3,4,5 MBB with 80% relief.  He also underwent MBB in 2015 with 80% relief.  He is interested in RFA.  He has an aching pain across the lower back, mainly on the right.  It worsens with sitting and standing.  He has some benefit with Naproxen.  He has intermittent muscle spasms as well.  He has tried stretching and PT exercises for months without benefit.  He denies any radiation of his pain or numbness.  His pain today is 3/10.    Initial encounter:    Jason Dutton presents to the clinic for the evaluation of right sided lower back pain. The pain started about 12 years ago following no inciting event and symptoms have been worsening. Patient was last seen in clinic over 3 years ago. Recently he was seen in [facility] and was prescribed a medrol dose pack but he never took it because his "flare up" had subsided. He always has some baseline pain that has increased recently. But during flare ups the pain becomes severe and even walking becomes difficult. 3 years ago he had BL lumbar MBB that provided him long-lasting relief so he never followed through with the RFA.    Brief history:    Pain Description:    The pain is located in the right lower back area without radiation.      At BEST  4/10     At WORST  9/10 on the WORST day.      On average pain is rated as 7/10.     Today the pain is rated as 8/10    The pain is described as aching, dull and throbbing      Symptoms interfere with daily activity, sleeping and work.     Exacerbating factors: Bending and Lifting.      Mitigating factors ice and medications.     Patient denies night fever/night sweats, urinary incontinence, bowel incontinence, significant weight loss and significant motor weakness.  Patient denies any suicidal or homicidal ideations    Pain Medications:  Current:  Motrin  Alleve    Tried in Past:  NSAIDs -Never  TCA " -Never  SNRI -Never  Anti-convulsants -Never  Muscle Relaxants -Never  Opioids-Never    Physical Therapy/Home Exercise: yes       report:  Not applicable    Pain Procedures:   2015 Bilateral L2,3,4,5 MBB- 80% relief  19 Right L2,3,4,5 MBB- 80% relief  10/2/19 Right L2,3,4,5 RFA- 80% relief    Chiropractor -never  Acupuncture - never  TENS unit -never  Spinal decompression -never  Joint replacement -never    Imagin19 MRI Lumbar  FINDINGS:  Alignment: Normal.    Vertebrae: L1 vertebral body hemangioma.  No diffuse marrow replacement process.  No fracture.    Discs: Disc desiccation and mild height loss at L3-4 and L4-5.    Cord: Normal.  Conus terminates at L1.    Degenerative findings:    T12-L1: No spinal canal stenosis or neural foraminal narrowing.    L1-L2: No spinal canal stenosis or neural foraminal narrowing.    L2-L3: No spinal canal stenosis or neural foraminal narrowing.    L3-L4: Posterior disc bulge and mild facet arthropathy without spinal canal stenosis or neural foraminal narrowing.    L4-L5: Annular fissure with posterior disc bulge and mild facet arthropathy.  No spinal canal stenosis or neural foraminal narrowing.    L5-S1: Mild facet arthropathy without spinal canal stenosis or neural narrowing.    Paraspinal muscles & soft tissues: Unremarkable.      Impression       1. Mild lumbar spondylosis with annular fissure at L4-5.     Lab Results   Component Value Date    WBC 4.38 2015    HGB 14.4 2015    HCT 40.5 2015    MCV 86 2015     2015     CMP  Sodium   Date Value Ref Range Status   2017 139 136 - 145 mmol/L Final     Potassium   Date Value Ref Range Status   2017 4.3 3.5 - 5.1 mmol/L Final     Chloride   Date Value Ref Range Status   2017 105 95 - 110 mmol/L Final     CO2   Date Value Ref Range Status   2017 24 23 - 29 mmol/L Final     Glucose   Date Value Ref Range Status   2017 90 70 - 110 mg/dL Final      BUN, Bld   Date Value Ref Range Status   12/18/2017 20 6 - 20 mg/dL Final     Creatinine   Date Value Ref Range Status   12/18/2017 1.5 (H) 0.5 - 1.4 mg/dL Final     Calcium   Date Value Ref Range Status   12/18/2017 9.2 8.7 - 10.5 mg/dL Final     Total Protein   Date Value Ref Range Status   12/18/2017 7.1 6.0 - 8.4 g/dL Final     Albumin   Date Value Ref Range Status   12/18/2017 4.1 3.5 - 5.2 g/dL Final     Total Bilirubin   Date Value Ref Range Status   12/18/2017 0.7 0.1 - 1.0 mg/dL Final     Comment:     For infants and newborns, interpretation of results should be based  on gestational age, weight and in agreement with clinical  observations.  Premature Infant recommended reference ranges:  Up to 24 hours.............<8.0 mg/dL  Up to 48 hours............<12.0 mg/dL  3-5 days..................<15.0 mg/dL  6-29 days.................<15.0 mg/dL       Alkaline Phosphatase   Date Value Ref Range Status   12/18/2017 68 55 - 135 U/L Final     AST   Date Value Ref Range Status   12/18/2017 29 10 - 40 U/L Final     ALT   Date Value Ref Range Status   12/18/2017 36 10 - 44 U/L Final     Anion Gap   Date Value Ref Range Status   12/18/2017 10 8 - 16 mmol/L Final     eGFR if    Date Value Ref Range Status   12/18/2017 >60 >60 mL/min/1.73 m^2 Final     eGFR if non    Date Value Ref Range Status   12/18/2017 54 (A) >60 mL/min/1.73 m^2 Final     Comment:     Calculation used to obtain the estimated glomerular filtration  rate (eGFR) is the CKD-EPI equation.          Past Medical History:   Diagnosis Date    DJD (degenerative joint disease), lumbar      Past Surgical History:   Procedure Laterality Date    INJECTION OF ANESTHETIC AGENT AROUND NERVE Right 9/4/2019    Procedure: BLOCK, NERVE, L2,L3,L4,L5 MEDIAL BRANCH;  Surgeon: Jake Fleder MD;  Location: [facility];  Service: Pain Management;  Laterality: Right;    RADIOFREQUENCY ABLATION Right 10/2/2019    Procedure:  RADIOFREQUENCY ABLATION, RIGHT L2-L3-L4-L5 MEDIAL BRANCH;  Surgeon: Jake Felder MD;  Location: [facility];  Service: Pain Management;  Laterality: Right;     Social History     Socioeconomic History    Marital status:      Spouse name: Not on file    Number of children: Not on file    Years of education: Not on file    Highest education level: Not on file   Occupational History    Not on file   Social Needs    Financial resource strain: Not on file    Food insecurity     Worry: Not on file     Inability: Not on file    Transportation needs     Medical: Not on file     Non-medical: Not on file   Tobacco Use    Smoking status: Never Smoker    Smokeless tobacco: Never Used   Substance and Sexual Activity    Alcohol use: Yes    Drug use: No    Sexual activity: Not on file   Lifestyle    Physical activity     Days per week: Not on file     Minutes per session: Not on file    Stress: Not on file   Relationships    Social connections     Talks on phone: Not on file     Gets together: Not on file     Attends [employer] service: Not on file     Active member of club or organization: Not on file     Attends meetings of clubs or organizations: Not on file     Relationship status: Not on file   Other Topics Concern    Not on file   Social History Narrative    Not on file     No family history on file.    Review of patient's allergies indicates:   Allergen Reactions    Penicillins Hives       Current Outpatient Medications   Medication Sig    ibuprofen (ADVIL,MOTRIN) 200 MG tablet Take 200 mg by mouth every 6 (six) hours as needed for Pain.    metaxalone (SKELAXIN) 800 MG tablet Take 1 tablet (800 mg total) by mouth 3 (three) times daily as needed for Pain.    naproxen (NAPROSYN) 500 MG tablet TAKE 1 TABLET(500 MG) BY MOUTH TWICE DAILY WITH MEALS     No current facility-administered medications for this visit.        REVIEW OF SYSTEMS:    GENERAL:  No weight loss, malaise or fevers.  HEENT:    No recent changes in vision or hearing  NECK:  Negative for lumps, no difficulty with swallowing.  RESPIRATORY:  Negative for cough, wheezing or shortness of breath, patient denies any recent URI.  CARDIOVASCULAR:  Negative for chest pain, leg swelling or palpitations.  GI:  Negative for abdominal discomfort, blood in stools or black stools or change in bowel habits.  MUSCULOSKELETAL:  See HPI.  SKIN:  Negative for lesions, rash, and itching.  PSYCH:  No mood disorder or recent psychosocial stressors.  Patients sleep is not disturbed secondary to pain.  HEMATOLOGY/LYMPHOLOGY:  Negative for prolonged bleeding, bruising easily or swollen nodes.  Patient is not currently taking any anti-coagulants  ENDO: No history of diabetes or thyroid dysfunction  NEURO:   No history of headaches, syncope, paralysis, seizures or tremors. tinnitus  All other reviewed and negative other than HPI.    OBJECTIVE:    There were no vitals taken for this visit.    PHYSICAL EXAMINATION:  Voice normal.  Normal affect without evidence of distress.     Prior PHYSICAL EXAMINATION:    GENERAL: Well appearing, in no acute distress, alert and oriented x3.  PSYCH:  Mood and affect appropriate.  SKIN: Skin color, texture, turgor normal, no rashes or lesions.  HEAD/FACE:  Normocephalic, atraumatic. Cranial nerves grossly intact.  CV: RRR with palpation of the radial artery.  PULM: No evidence of respiratory difficulty, symmetric chest rise.  GI:  Soft and non-tender.  BACK: Straight leg raising in the supine position is negative to radicular pain. There is mild pain to palpation over the facet joints of the lumbar spine on the right side.  Normal range of motion with pain on extension.  Mildly positive facet loading on the right side.  EXTREMITIES: Peripheral joint ROM is full and pain free without obvious instability or laxity in all four extremities. No deformities, edema, or skin discoloration. Good capillary refill.  MUSCULOSKELETAL: Mild TTP  over both SI joints.  There is no pain with palpation over the sacroiliac joints bilaterally.  FABERs test is negative.  FADIRs test is negative.   Bilateral upper and lower extremity strength is normal and symmetric.  No atrophy or tone abnormalities are noted.  NEURO: Bilateral upper and lower extremity coordination and muscle stretch reflexes are physiologic and symmetric.  Plantar response are downgoing. No clonus.  No loss of sensation is noted.  GAIT: Normal.    ASSESSMENT: 50 y.o. year old male with right sided lower back pain, consistent with the following diagnoses:    Encounter Diagnoses   Name Primary?    Chronic right-sided low back pain without sciatica     DDD (degenerative disc disease), lumbosacral     Spondylosis of lumbar region without myelopathy or radiculopathy     Myofascial muscle pain     Osteoarthritis of lumbar spine, unspecified spinal osteoarthritis complication status Yes    Lumbar spondylosis     Myofascial pain        PLAN:     - Prior records reviewed    - He is approx 10 months post RFA to Right side. He will consider repeat after trip he is leaving on in 3 days.     - Refill Skelaxin     - Refill Naproxen    - Start Voltaren gel    - Patient can continue with medications for now since they are providing benefits, using them appropriately, and without side effects..    - He will be restarting [facility] after daughters wedding this upcoming week.    - RTC in 2 days for evaluation and consider TPI.       The above plan and management options were discussed at length with patient. Patient is in agreement with the above and verbalized understanding.     Steffen Vasquez NP  07/14/2020

## 2020-07-15 ENCOUNTER — TELEPHONE (OUTPATIENT)
Dept: PAIN MEDICINE | Facility: CLINIC | Age: 50
End: 2020-07-15

## 2020-07-15 NOTE — TELEPHONE ENCOUNTER
Staff left detailed message regarding appointment scheduled 7/16/20 at 9 am with Steffen Vasquez as in office visit and to insure patient has direct line to call before coming up to the clinic also visitors policy.

## 2020-07-16 ENCOUNTER — OFFICE VISIT (OUTPATIENT)
Dept: PAIN MEDICINE | Facility: CLINIC | Age: 50
End: 2020-07-16
Payer: COMMERCIAL

## 2020-07-16 VITALS
OXYGEN SATURATION: 100 % | BODY MASS INDEX: 29 KG/M2 | WEIGHT: 238.13 LBS | RESPIRATION RATE: 18 BRPM | TEMPERATURE: 99 F | DIASTOLIC BLOOD PRESSURE: 93 MMHG | HEIGHT: 76 IN | HEART RATE: 61 BPM | SYSTOLIC BLOOD PRESSURE: 161 MMHG

## 2020-07-16 DIAGNOSIS — M47.819 SPONDYLOSIS WITHOUT MYELOPATHY: Primary | ICD-10-CM

## 2020-07-16 DIAGNOSIS — M79.18 MYOFASCIAL MUSCLE PAIN: ICD-10-CM

## 2020-07-16 DIAGNOSIS — M47.816 SPONDYLOSIS OF LUMBAR REGION WITHOUT MYELOPATHY OR RADICULOPATHY: ICD-10-CM

## 2020-07-16 DIAGNOSIS — M47.816 LUMBAR SPONDYLOSIS: ICD-10-CM

## 2020-07-16 PROCEDURE — 20553 PR INJECT TRIGGER POINTS, > 3: ICD-10-PCS | Mod: S$GLB,,, | Performed by: NURSE PRACTITIONER

## 2020-07-16 PROCEDURE — 3008F PR BODY MASS INDEX (BMI) DOCUMENTED: ICD-10-PCS | Mod: CPTII,S$GLB,, | Performed by: NURSE PRACTITIONER

## 2020-07-16 PROCEDURE — 99999 PR PBB SHADOW E&M-EST. PATIENT-LVL III: ICD-10-PCS | Mod: PBBFAC,,, | Performed by: NURSE PRACTITIONER

## 2020-07-16 PROCEDURE — 3008F BODY MASS INDEX DOCD: CPT | Mod: CPTII,S$GLB,, | Performed by: NURSE PRACTITIONER

## 2020-07-16 PROCEDURE — 20553 NJX 1/MLT TRIGGER POINTS 3/>: CPT | Mod: S$GLB,,, | Performed by: NURSE PRACTITIONER

## 2020-07-16 PROCEDURE — 99213 PR OFFICE/OUTPT VISIT, EST, LEVL III, 20-29 MIN: ICD-10-PCS | Mod: 25,S$GLB,, | Performed by: NURSE PRACTITIONER

## 2020-07-16 PROCEDURE — 99213 OFFICE O/P EST LOW 20 MIN: CPT | Mod: 25,S$GLB,, | Performed by: NURSE PRACTITIONER

## 2020-07-16 PROCEDURE — 99999 PR PBB SHADOW E&M-EST. PATIENT-LVL III: CPT | Mod: PBBFAC,,, | Performed by: NURSE PRACTITIONER

## 2020-07-16 RX ORDER — BUPIVACAINE HYDROCHLORIDE 2.5 MG/ML
9 INJECTION, SOLUTION EPIDURAL; INFILTRATION; INTRACAUDAL
Status: COMPLETED | OUTPATIENT
Start: 2020-07-16 | End: 2020-07-16

## 2020-07-16 RX ORDER — BETAMETHASONE SODIUM PHOSPHATE AND BETAMETHASONE ACETATE 3; 3 MG/ML; MG/ML
6 INJECTION, SUSPENSION INTRA-ARTICULAR; INTRALESIONAL; INTRAMUSCULAR; SOFT TISSUE
Status: COMPLETED | OUTPATIENT
Start: 2020-07-16 | End: 2020-07-16

## 2020-07-16 RX ADMIN — BETAMETHASONE SODIUM PHOSPHATE AND BETAMETHASONE ACETATE 6 MG: 3; 3 INJECTION, SUSPENSION INTRA-ARTICULAR; INTRALESIONAL; INTRAMUSCULAR; SOFT TISSUE at 09:07

## 2020-07-16 RX ADMIN — BUPIVACAINE HYDROCHLORIDE 22.5 MG: 2.5 INJECTION, SOLUTION EPIDURAL; INFILTRATION; INTRACAUDAL at 09:07

## 2020-07-16 NOTE — PROGRESS NOTES
Chronic Pain-Established Note (Follow up visit)         Referring Physician: No ref. provider found    Chief Complaint: No chief complaint on file.       SUBJECTIVE: Disclaimer: This note has been generated using voice-recognition software. There may be typographical errors that have been missed during proof-reading    Interval History 7/16/2020:  Pt presents for evaluation and TPI today prior to leaving to daughters wedding tomorrow. When he returns he will be re-starting healthy back and will re-evaluate to consider repeat RFA if needed. The patient denies myelopathic symptoms such as handwriting changes or difficulty with buttons/coins/keys. Denies perineal paresthesias, bowel/bladder dysfunction.      Interval History 7/14/2020:  The patient presents for follow-up of lower back pain.  Patient has had gradual increase of right lower back pain over past several weeks.  He has had prior RFA to left than right lower lumbar lasting for over 9 months.  The right side is primary generator.  He is taking Skelaxin, naproxen and will be going on a trip for daughter's wedding this Friday.  He voices concern as he is having to sit for long periods in plane. The patient denies myelopathic symptoms such as handwriting changes or difficulty with buttons/coins/keys. Denies perineal paresthesias, bowel/bladder dysfunction. He will be restarting healthy back after trip. Rates pain 5/10.       Interval History 10/28/2019:  The patient is here for follow up of right lower back pain.  He is now s/p right L2,3,4,5 RFA.  He is reporting about 60% relief.  He still has some intermittent muscle spasms on the right.  He has some benefit with Skelaxin.  He previously had benefit with [facility] and would like to repeat PT.  His pain today is 5/10.    Interval History 9/16/2019:  The patient returns for follow up of right sided back pain.  He is s/p right L2,3,4,5 MBB with 80% relief.  He also underwent MBB in 2015 with 80% relief.  He  "is interested in RFA.  He has an aching pain across the lower back, mainly on the right.  It worsens with sitting and standing.  He has some benefit with Naproxen.  He has intermittent muscle spasms as well.  He has tried stretching and PT exercises for months without benefit.  He denies any radiation of his pain or numbness.  His pain today is 3/10.    Initial encounter:    Jason Dutton presents to the clinic for the evaluation of right sided lower back pain. The pain started about 12 years ago following no inciting event and symptoms have been worsening. Patient was last seen in clinic over 3 years ago. Recently he was seen in [facility] and was prescribed a medrol dose pack but he never took it because his "flare up" had subsided. He always has some baseline pain that has increased recently. But during flare ups the pain becomes severe and even walking becomes difficult. 3 years ago he had BL lumbar MBB that provided him long-lasting relief so he never followed through with the RFA.    Brief history:    Pain Description:    The pain is located in the right lower back area without radiation.      At BEST  4/10     At WORST  9/10 on the WORST day.      On average pain is rated as 7/10.     Today the pain is rated as 8/10    The pain is described as aching, dull and throbbing      Symptoms interfere with daily activity, sleeping and work.     Exacerbating factors: Bending and Lifting.      Mitigating factors ice and medications.     Patient denies night fever/night sweats, urinary incontinence, bowel incontinence, significant weight loss and significant motor weakness.  Patient denies any suicidal or homicidal ideations    Pain Medications:  Current:  Motrin  Alleve    Tried in Past:  NSAIDs -Never  TCA -Never  SNRI -Never  Anti-convulsants -Never  Muscle Relaxants -Never  Opioids-Never    Physical Therapy/Home Exercise: yes       report:  Not applicable    Pain Procedures:   11/27/2015 Bilateral " L2,3,4,5 MBB- 80% relief  19 Right L2,3,4,5 MBB- 80% relief  10/2/19 Right L2,3,4,5 RFA- 80% relief    Chiropractor -never  Acupuncture - never  TENS unit -never  Spinal decompression -never  Joint replacement -never    Imagin19 MRI Lumbar  FINDINGS:  Alignment: Normal.    Vertebrae: L1 vertebral body hemangioma.  No diffuse marrow replacement process.  No fracture.    Discs: Disc desiccation and mild height loss at L3-4 and L4-5.    Cord: Normal.  Conus terminates at L1.    Degenerative findings:    T12-L1: No spinal canal stenosis or neural foraminal narrowing.    L1-L2: No spinal canal stenosis or neural foraminal narrowing.    L2-L3: No spinal canal stenosis or neural foraminal narrowing.    L3-L4: Posterior disc bulge and mild facet arthropathy without spinal canal stenosis or neural foraminal narrowing.    L4-L5: Annular fissure with posterior disc bulge and mild facet arthropathy.  No spinal canal stenosis or neural foraminal narrowing.    L5-S1: Mild facet arthropathy without spinal canal stenosis or neural narrowing.    Paraspinal muscles & soft tissues: Unremarkable.      Impression       1. Mild lumbar spondylosis with annular fissure at L4-5.     Lab Results   Component Value Date    WBC 4.38 2015    HGB 14.4 2015    HCT 40.5 2015    MCV 86 2015     2015     CMP  Sodium   Date Value Ref Range Status   2017 139 136 - 145 mmol/L Final     Potassium   Date Value Ref Range Status   2017 4.3 3.5 - 5.1 mmol/L Final     Chloride   Date Value Ref Range Status   2017 105 95 - 110 mmol/L Final     CO2   Date Value Ref Range Status   2017 24 23 - 29 mmol/L Final     Glucose   Date Value Ref Range Status   2017 90 70 - 110 mg/dL Final     BUN, Bld   Date Value Ref Range Status   2017 20 6 - 20 mg/dL Final     Creatinine   Date Value Ref Range Status   2017 1.5 (H) 0.5 - 1.4 mg/dL Final     Calcium   Date Value Ref Range  Status   12/18/2017 9.2 8.7 - 10.5 mg/dL Final     Total Protein   Date Value Ref Range Status   12/18/2017 7.1 6.0 - 8.4 g/dL Final     Albumin   Date Value Ref Range Status   12/18/2017 4.1 3.5 - 5.2 g/dL Final     Total Bilirubin   Date Value Ref Range Status   12/18/2017 0.7 0.1 - 1.0 mg/dL Final     Comment:     For infants and newborns, interpretation of results should be based  on gestational age, weight and in agreement with clinical  observations.  Premature Infant recommended reference ranges:  Up to 24 hours.............<8.0 mg/dL  Up to 48 hours............<12.0 mg/dL  3-5 days..................<15.0 mg/dL  6-29 days.................<15.0 mg/dL       Alkaline Phosphatase   Date Value Ref Range Status   12/18/2017 68 55 - 135 U/L Final     AST   Date Value Ref Range Status   12/18/2017 29 10 - 40 U/L Final     ALT   Date Value Ref Range Status   12/18/2017 36 10 - 44 U/L Final     Anion Gap   Date Value Ref Range Status   12/18/2017 10 8 - 16 mmol/L Final     eGFR if    Date Value Ref Range Status   12/18/2017 >60 >60 mL/min/1.73 m^2 Final     eGFR if non    Date Value Ref Range Status   12/18/2017 54 (A) >60 mL/min/1.73 m^2 Final     Comment:     Calculation used to obtain the estimated glomerular filtration  rate (eGFR) is the CKD-EPI equation.          Past Medical History:   Diagnosis Date    DJD (degenerative joint disease), lumbar      Past Surgical History:   Procedure Laterality Date    INJECTION OF ANESTHETIC AGENT AROUND NERVE Right 9/4/2019    Procedure: BLOCK, NERVE, L2,L3,L4,L5 MEDIAL BRANCH;  Surgeon: Jake Felder MD;  Location: [facility];  Service: Pain Management;  Laterality: Right;    RADIOFREQUENCY ABLATION Right 10/2/2019    Procedure: RADIOFREQUENCY ABLATION, RIGHT L2-L3-L4-L5 MEDIAL BRANCH;  Surgeon: Jake Felder MD;  Location: [facility];  Service: Pain Management;  Laterality: Right;     Social History     Socioeconomic History     Marital status:      Spouse name: Not on file    Number of children: Not on file    Years of education: Not on file    Highest education level: Not on file   Occupational History    Not on file   Social Needs    Financial resource strain: Not on file    Food insecurity     Worry: Not on file     Inability: Not on file    Transportation needs     Medical: Not on file     Non-medical: Not on file   Tobacco Use    Smoking status: Never Smoker    Smokeless tobacco: Never Used   Substance and Sexual Activity    Alcohol use: Yes    Drug use: No    Sexual activity: Not on file   Lifestyle    Physical activity     Days per week: Not on file     Minutes per session: Not on file    Stress: Not on file   Relationships    Social connections     Talks on phone: Not on file     Gets together: Not on file     Attends Temple service: Not on file     Active member of club or organization: Not on file     Attends meetings of clubs or organizations: Not on file     Relationship status: Not on file   Other Topics Concern    Not on file   Social History Narrative    Not on file     No family history on file.    Review of patient's allergies indicates:   Allergen Reactions    Penicillins Hives       Current Outpatient Medications   Medication Sig    diclofenac sodium (VOLTAREN) 1 % Gel Apply 2 g topically 4 (four) times daily.    metaxalone (SKELAXIN) 800 MG tablet Take 1 tablet (800 mg total) by mouth 3 (three) times daily as needed for Pain.    naproxen (NAPROSYN) 500 MG tablet Take 1 tablet (500 mg total) by mouth 2 (two) times daily with meals.     No current facility-administered medications for this visit.        REVIEW OF SYSTEMS:    GENERAL:  No weight loss, malaise or fevers.  HEENT:   No recent changes in vision or hearing  NECK:  Negative for lumps, no difficulty with swallowing.  RESPIRATORY:  Negative for cough, wheezing or shortness of breath, patient denies any recent  "URI.  CARDIOVASCULAR:  Negative for chest pain, leg swelling or palpitations.  GI:  Negative for abdominal discomfort, blood in stools or black stools or change in bowel habits.  MUSCULOSKELETAL:  See HPI.  SKIN:  Negative for lesions, rash, and itching.  PSYCH:  No mood disorder or recent psychosocial stressors.  Patients sleep is not disturbed secondary to pain.  HEMATOLOGY/LYMPHOLOGY:  Negative for prolonged bleeding, bruising easily or swollen nodes.  Patient is not currently taking any anti-coagulants  ENDO: No history of diabetes or thyroid dysfunction  NEURO:   No history of headaches, syncope, paralysis, seizures or tremors. tinnitus  All other reviewed and negative other than HPI.    OBJECTIVE:    BP (!) 161/93   Pulse 61   Temp 98.5 °F (36.9 °C)   Resp 18   Ht 6' 4" (1.93 m)   Wt 108 kg (238 lb 1.6 oz)   SpO2 100%   BMI 28.98 kg/m²     PHYSICAL EXAMINATION:  Voice normal.  Normal affect without evidence of distress.     Prior PHYSICAL EXAMINATION:    GENERAL: Well appearing, in no acute distress, alert and oriented x3.  PSYCH:  Mood and affect appropriate.  SKIN: Skin color, texture, turgor normal, no rashes or lesions.  HEAD/FACE:  Normocephalic, atraumatic. Cranial nerves grossly intact.  CV: RRR with palpation of the radial artery.  PULM: No evidence of respiratory difficulty, symmetric chest rise.  GI:  Soft and non-tender.  BACK: Straight leg raising in the supine position is negative to radicular pain. There is mild pain to palpation over the facet joints of the lumbar spine on the right side.  Normal range of motion with pain on extension.  Mildly positive facet loading on the right side.  EXTREMITIES: Peripheral joint ROM is full and pain free without obvious instability or laxity in all four extremities. No deformities, edema, or skin discoloration. Good capillary refill.  MUSCULOSKELETAL: Mild TTP over both SI joints.  There is no pain with palpation over the sacroiliac joints bilaterally. "  FABERs test is negative.  FADIRs test is negative.   Bilateral upper and lower extremity strength is normal and symmetric.  No atrophy or tone abnormalities are noted.  NEURO: Bilateral upper and lower extremity coordination and muscle stretch reflexes are physiologic and symmetric.  Plantar response are downgoing. No clonus.  No loss of sensation is noted.  GAIT: Normal.    ASSESSMENT: 50 y.o. year old male with right sided lower back pain, consistent with the following diagnoses:    Encounter Diagnoses   Name Primary?    Spondylosis without myelopathy Yes    Spondylosis of lumbar region without myelopathy or radiculopathy     Myofascial muscle pain     Lumbar spondylosis        PLAN:     - Prior records reviewed    - He is approx 10 months post RFA to Right side. He will consider repeat after trip he is leaving on in 3 days.     - Continue Skelaxin     - Continue Naproxen    - Continue Voltaren gel    - Patient can continue with medications for now since they are providing benefits, using them appropriately, and without side effects..    - He will be restarting [facility] after daughters wedding this upcoming week.    - TPI done today.     - RTC PRN after trip and re-starting healthy back      The above plan and management options were discussed at length with patient. Patient is in agreement with the above and verbalized understanding.     Steffen Vasquez NP  07/16/2020      Trigger Point Injection:   The procedure was discussed with the patient including complications of nerve damage,  bleeding, infection, and failure of pain relief.   Trigger points were identified by palpation and marked. Alcohol prep of sites done. A mixture of 9mL 0.25% bupivacaine +40mg Depo-Medrol was prepared (10 mL total).   A 27-gauge needle was advanced to the point of maximal tenderness, and medication was injected after negative aspiration. All sites done in the same manner. Patient tolerated the procedure well and without  complications. Sites injected included: lumbar paraspinals on the right (3 sites total).

## 2020-08-14 ENCOUNTER — CLINICAL SUPPORT (OUTPATIENT)
Dept: REHABILITATION | Facility: OTHER | Age: 50
End: 2020-08-14
Attending: PHYSICIAN ASSISTANT
Payer: COMMERCIAL

## 2020-08-14 DIAGNOSIS — M54.50 CHRONIC RIGHT-SIDED LOW BACK PAIN WITHOUT SCIATICA: ICD-10-CM

## 2020-08-14 DIAGNOSIS — M53.3 SI (SACROILIAC) JOINT DYSFUNCTION: ICD-10-CM

## 2020-08-14 DIAGNOSIS — R29.898 DECREASED STRENGTH OF TRUNK AND BACK: ICD-10-CM

## 2020-08-14 DIAGNOSIS — G89.29 CHRONIC RIGHT-SIDED LOW BACK PAIN WITHOUT SCIATICA: ICD-10-CM

## 2020-08-14 PROCEDURE — 97110 THERAPEUTIC EXERCISES: CPT

## 2020-08-14 PROCEDURE — 97162 PT EVAL MOD COMPLEX 30 MIN: CPT

## 2020-08-14 NOTE — PLAN OF CARE
[facility] - PHYSICAL THERAPY EVALUATION     Name: Jason Dutton  Clinic Number: [id number]    Therapy Diagnosis:   Encounter Diagnoses   Name Primary?    Chronic right-sided low back pain without sciatica     SI (sacroiliac) joint dysfunction     Decreased strength of trunk and back      Physician: Bettie White PA-C    Physician Orders: PT Eval and Treat   Medical Diagnosis from Referral:     M47.816 (ICD-10-CM) - Lumbar spondylosis    M51.26 (ICD-10-CM) - Lumbar discogenic pain syndrome  Evaluation Date: 8/14/2020  Authorization Period Expiration: 12/31/2020  Plan of Care Expiration: 11/14/2020  Reassessment Due: 8/14/2020  Visit # / Visits authorized: 1/ 20     Time In: 9:30  Time Out: 10:45  Total Billable Time: 75 minutes     Precautions: Standard and injections at the low back, RFA     Pattern of pain determined: 1      Subjective   Date of onset: 13 years ago  History of current condition - Jason reports: Previously in [facility] and it helped. Since then he was doing well not taking as much anti inflammatory medication. About a year ago he bent down a little bit and the back gave out, since then it has been bad. He did the RFA that helped on the R side low the low back and in the RLE. Right now sitting aggrivates him depending on the chair, fine walking (5x a week with wife), bending still aggravates the pain, some spasms when bending. Does fine first thing in the morning. Daily pain/ discomfort fluctuates but constant. Spasms lead to pain. Some symptoms in the legs standing for a while numbness static standing, not every time, R side infrequently. Flair ups has only happened once and that was 10/10    PER MD:  Interval History 10/28/2019:  The patient is here for follow up of right lower back pain.  He is now s/p right L2,3,4,5 RFA.  He is reporting about 60% relief.  He still has some intermittent muscle spasms on the right.  He has some benefit with Skelaxin.  He previously had  "benefit with [facility] and would like to repeat PT.  His pain today is 5/10.     Interval History 9/16/2019:  The patient returns for follow up of right sided back pain.  He is s/p right L2,3,4,5 MBB with 80% relief.  He also underwent MBB in 2015 with 80% relief.  He is interested in RFA.  He has an aching pain across the lower back, mainly on the right.  It worsens with sitting and standing.  He has some benefit with Naproxen.  He has intermittent muscle spasms as well.  He has tried stretching and PT exercises for months without benefit.  He denies any radiation of his pain or numbness.  His pain today is 3/10.     Initial encounter:     Jason Dutton presents to the clinic for the evaluation of right sided lower back pain. The pain started about 12 years ago following no inciting event and symptoms have been worsening. Patient was last seen in clinic over 3 years ago. Recently he was seen in [facility] and was prescribed a medrol dose pack but he never took it because his "flare up" had subsided. He always has some baseline pain that has increased recently. But during flare ups the pain becomes severe and even walking becomes difficult. 3 years ago he had BL lumbar MBB that provided him long-lasting relief so he never followed through with the RFA.     Brief history:     Pain Description:     The pain is located in the right lower back area without radiation.       At BEST  4/10      At WORST  9/10 on the WORST day.       On average pain is rated as 7/10.      Today the pain is rated as 8/10     The pain is described as aching, dull and throbbing       Symptoms interfere with daily activity, sleeping and work.      Exacerbating factors: Bending and Lifting.       Mitigating factors ice and medications.      Medical History:   Past Medical History:   Diagnosis Date    DJD (degenerative joint disease), lumbar        Surgical History:   Jason Dutton  has a past surgical history that includes " Injection of anesthetic agent around nerve (Right, 9/4/2019) and Radiofrequency ablation (Right, 10/2/2019).    Medications:   Jason has a current medication list which includes the following prescription(s): diclofenac sodium, metaxalone, and naproxen.    Allergies:   Review of patient's allergies indicates:   Allergen Reactions    Penicillins Hives        Imaging, 7/2019  EXAMINATION:  MRI LUMBAR SPINE WITHOUT CONTRAST     CLINICAL HISTORY:  evaluate for annular tear.; Low back pain     TECHNIQUE:  Multiplanar, multisequence MR images were acquired from the thoracolumbar junction to the sacrum without contrast.     COMPARISON:  Radiograph 02/08/2019; MRI 11/05/2015.     FINDINGS:  Alignment: Normal.     Vertebrae: L1 vertebral body hemangioma.  No diffuse marrow replacement process.  No fracture.     Discs: Disc desiccation and mild height loss at L3-4 and L4-5.     Cord: Normal.  Conus terminates at L1.     Degenerative findings:     T12-L1: No spinal canal stenosis or neural foraminal narrowing.     L1-L2: No spinal canal stenosis or neural foraminal narrowing.     L2-L3: No spinal canal stenosis or neural foraminal narrowing.     L3-L4: Posterior disc bulge and mild facet arthropathy without spinal canal stenosis or neural foraminal narrowing.     L4-L5: Annular fissure with posterior disc bulge and mild facet arthropathy.  No spinal canal stenosis or neural foraminal narrowing.     L5-S1: Mild facet arthropathy without spinal canal stenosis or neural narrowing.     Paraspinal muscles & soft tissues: Unremarkable.     Impression:     1. Mild lumbar spondylosis with annular fissure at L4-5.    Prior Therapy: Yes, [facility]  Prior Treatment: Yes, injections and RFA  Social History: With wife   Occupation: Engineering   Leisure: Walks, tennis      Prior Level of Function: I with ADL  Current Level of Function: prolonged standing, sitting difficulty  DME owned/used: none        Pain:  Current 6/10, worst 10/10, best 3/10    Location:  R side low back  Description: spasms  Aggravating Factors:  sitting  Easing Factors:  changing positions, used to ice a lot but not so much anymore, antiinflammatory  Disturbed Sleep: None        Pattern of pain questions:  1.  Where is your pain the worst? Low back  2.  Is your pain constant or intermittent? constant  3.  Does bending forward make your typical pain worse? yes  4.  Since the start of your back pain, has there been a change in your bowel or bladder? no  5.  What can't you do now that you use to be able to do? Sitting and prolonged standing some what      Pts goals: Be pain free, be able to pick stuff up.       Red Flag Screening:   Cough  Sneeze  Strain: (+)  Bladder/ bowel: (--)  Falls: (--)  Night pain: (--)  Unexplained weight loss: (--)  General health: good    OBJECTIVE     Postural examination/scapula alignment:  R pelvis lower, slight ER at the L LE, rounded shoulder, forward head  Joint integrity: Firm end feeling  Skin integrity: intact  Edema: none noted  Sitting: poor  Standing: poor  Correction of posture: better with lumbar roll    MOVEMENT LOSS    ROM Loss   Flexion Unable to due to fear of movement   Extension minimal loss   Side bending Right within functional limits   Side bending Left moderate loss   Rotation Right within functional limits   Rotation Left within functional limits   L shift can feel it on the R some at the hep region    Lower Extremity Strength  Right LE  Left LE    Hip flexion: 5/5 Hip flexion: 5/5   Hip extension:  5/5 Hip extension: 5/5   Hip abduction: 5/5 Hip abduction: 5/5   Hip adduction:  5/5 Hip adduction:  5/5   Hip Internal rotation   5/5 Hip Internal rotation 5/5   Knee Flexion 5/5 Knee Flexion 5/5   Knee Extension 5/5 Knee Extension 5/5   Ankle dorsiflexion: 5/5 Ankle dorsiflexion: 5/5   Ankle plantarflexion: 5/5 Ankle plantarflexion: 5/5       GAIT:  Assistive Device used: none  Level of Assistance: independent  Patient displays the  following gait deviations:  no gait deviations observed.     Special Tests:   Test Name  Test Result   Prone Instability Test (--)   SI Joint Provocation Test (--)   Straight Leg Raise (--)   Neural Tension Test (--)   Crossed Straight Leg Raise (--)   Walking on toes (--)   Walking on heels  (--)   R anterior innominate rotation    NEUROLOGICAL SCREENING     Sensory deficit: Intact to light touch    Reflexes:    Left Right   Patella Tendon 1+ 1+   Achilles Tendon 1+ 1+   Clonus (--) (--)     REPEATED TEST MOVEMENTS:  Repeated Flexion in sitting better   Repeated Extension in Standing nt   Repeated Flexion in lying better   Repeated Extension in lying  better       STATIC TESTS   Sitting slouched  no effect   Sitting erect no effect   Seated flexion: after repeated seated flexion  Z lying = good  Sitting upright = bad    Baseline Isometric Testing on Med X equipment: Testing administered by PT  Date of testin2020    ROM 0-42 deg   Max Peak Torque 202    Min Peak Torque 93    Flex/Ext Ratio 2.17   % below normative data 24         Limitation/Restriction for FOTO  Survey    Therapist reviewed FOTO scores for Jason Dutton on 2020.   FOTO documents entered into [employer] - see Media section.    Limitation Score: 43%  Goal: 34%             Treatment   Treatment Time In: 10:15  Treatment Time Out: 10:45  Total Treatment time separate from Evaluation: 30 minutes      Jason received therapeutic exercises to develop/improve posture, lumbar/cervical ROM, strength and muscular endurance for 30 minutes including the following exercises:     Med x dynamic exercise and baseline IM test    Flexion in lying 10x  Extension in Lying 10x  Bridges 10x    Add/assess next visit  Abdominal exercise he does at home  Innominate rotation with METs  Clamshell  Bridge with march  Focus on glute and extensor stability    [facility] 2020   Visit Number 1   VAS Pain Rating 6   Treadmill Time (in min.) -   Speed -   Incline  -   Lumbar Stretches - Slouch Overcorrection 10   Extension in Lying 10   Extension in Standing -   Flexion in Lying 10   Flexion in Sitting 10   Lumbar Extension Seat Pad 0   Femur Restraint 6   Top Dead Center 24   Counterweight 440   Lumbar Flexion 42   Lumbar Extension 0   Lumbar Peak Torque 202   Min Torque 93   Test Percent Below Normative Data 24   Lumbar Weight -   Repetitions -   Rating of Perceived Exertion -   Ice - Z Lie (in min.) 10           Written Home Exercises Provided: yes.  Exercises were reviewed and Jason was able to demonstrate them prior to the end of the session.  Jason demonstrated good  understanding of the education provided.     See EMR under Patient Instructions for exercises provided 8/14/2020.      Education provided:   - Patient received education regarding proper posture and body mechanics.  Patient was given top 10 tips handout which discusses posture seated, standing, lifting correctly, components of exercise, importance of nutrition and hydration, and importance of sleep.  - Justo roll tried, recommended, and purchase information was provided.    - Patient received a handout regarding anticipated muscular soreness following the isometric test and strategies for management were reviewed with patient including stretching, using ice and scheduled rest.   - Patient received education on the [facility] program, purpose of the isometric test, progression of back strengthening as well as wellness approach and systemic strengthening.  Details of the program were discussed.  Reviewed that patient should feel support/pressure from med ex restraints but no pain or discomfort and patient expressed understanding.    Jason received cold pack for 10 minutes to low back in z lying.    Assessment   Jason is a 50 y.o. male referred to [facility] with a medical diagnosis of Lumbar spondylosies and Lumbar discogenic pain syndrome. Pt presents with R side low back pain and increased pain  with static standing and prolonged sitting. He was fearful of completing standing forward flexion but did well and decreased pain with repeated seated flexion, knees to chest, and Z lying. Pt has some innominate rotation that was corrected but plan to continue to assess, he did not have any increase in symptoms with SIJ cluster tests. Pt has some difficulty and fatigue with single leg bridge on the L. Complete flexion and extension exercises as well as strengthening and pt reported decreased discomfort overall, he also presents with low back weakness with test data 24 below normative data, poor posture, poor ergonomics.    Pain Pattern: 1       Pt prognosis is Good.   Pt will benefit from skilled outpatient Physical Therapy to address the deficits stated above and in the chart below, provide pt/family education, and to maximize pt's level of independence. Based on the above history and physical examination an active physical therapy program is recommended.  Pt will continue to benefit from skilled outpatient physical therapy to address the deficits listed below in the chart, provide pt/family education and to maximize pt's level of independence in the home and community environment. .       Plan of care discussed with patient: Yes  Pt's spiritual, cultural and educational needs considered and patient is agreeable to the plan of care and goals as stated below:     Anticipated Barriers for therapy: none    PT Evaluation Completed? Yes    Medical necessity is demonstrated by the following problem list.    Pt presents with the following impairments:     History  Co-morbidities and personal factors that may impact the plan of care Co-morbidities:   prior lumbar surgery    Personal Factors:   lifestyle     low   Examination  Body Structures and Functions, activity limitations and participation restrictions that may impact the plan of care Body Regions:   back  lower extremities    Body Systems:    gross  symmetry  ROM  strength  gross coordinated movement  transfers  transitions  motor control  motor learning    Participation Restrictions:   none    Activity limitations:   Learning and applying knowledge  no deficits    General Tasks and Commands  no deficits    Communication  no deficits    Mobility  lifting and carrying objects    Self care  no deficits    Domestic Life  doing house work (cleaning house, washing dishes, laundry)    Interactions/Relationships  no deficits    Life Areas  no deficits    Community and Social Life  no deficits         moderate   Clinical Presentation stable and uncomplicated low   Decision Making/ Complexity Score: moderate       GOALS: Pt is in agreement with the following goals.    Short term goals:  6 weeks or 10 visits   1.  Pt will demonstrate increased lumbar ROM by at least 3 degrees from the initial ROM value with improvements noted in functional ROM and ability to perform ADLs.  2.  Pt will demonstrate increased MedX average isometric strength value  by 10% from initial test resulting in improved ability to perform bending, lifting, and carrying activities safely, confidently.    3.  Patient report a reduction in worst pain score by 1-2 points for improved tolerance for static standing.  4.  Pt able to perform HEP correctly with minimal cueing or supervision from therapist to encourage independent management of symptoms.       Long term goals: 10 weeks or 20 visits   1. Pt will demonstrate increased lumbar ROM by at least 6 degrees from initial ROM value, resulting in improved ability to perform functional fwd bending while standing and sitting.   2. Pt will demonstrate increased MedX average isometric strength value  by 20% from initial test resulting in improved ability to perform bending, lifting, and carrying activities safely, confidently.  3. Pt to demonstrate ability to independently control and reduce their pain through posture positioning and mechanical movements  "throughout a typical day.  4.  Pt will demonstrate reduced pain and improved functional outcomes as reported on the FOTO by reaching a limitation score of < or = 35% or less in order to demonstrate subjective improvement in pt's condition.    5. Pt will demonstrate independence with the HEP at discharge  6.  Pt will be able to sit in one position for 30 - 60min without increased pain.      Plan   Outpatient physical therapy 2x week for 10 weeks or 20 visits to include the following:   - Patient education  - Therapeutic exercise  - Manual therapy  - Performance testing   - Neuromuscular Re-education  - Therapeutic activity   - Modalities    Pt may be seen by PTA as part of the rehabilitation team.     Therapist: Alida Talbot, PT  8/14/2020    "I certify the need for these services furnished under this plan of treatment and while under my care."    ____________________________________  Physician/Referring Practitioner    _______________  Date of Signature          "

## 2020-08-17 ENCOUNTER — CLINICAL SUPPORT (OUTPATIENT)
Dept: REHABILITATION | Facility: OTHER | Age: 50
End: 2020-08-17
Attending: PHYSICIAN ASSISTANT
Payer: COMMERCIAL

## 2020-08-17 DIAGNOSIS — R29.898 DECREASED STRENGTH OF TRUNK AND BACK: ICD-10-CM

## 2020-08-17 DIAGNOSIS — M54.50 CHRONIC RIGHT-SIDED LOW BACK PAIN WITHOUT SCIATICA: Primary | ICD-10-CM

## 2020-08-17 DIAGNOSIS — M53.3 SI (SACROILIAC) JOINT DYSFUNCTION: ICD-10-CM

## 2020-08-17 DIAGNOSIS — G89.29 CHRONIC RIGHT-SIDED LOW BACK PAIN WITHOUT SCIATICA: Primary | ICD-10-CM

## 2020-08-17 PROCEDURE — 97110 THERAPEUTIC EXERCISES: CPT

## 2020-08-17 NOTE — PROGRESS NOTES
[facility] Physical Therapy Treatment      Name: Jason Dutton  Clinic Number: [id number]    Therapy Diagnosis:   Encounter Diagnoses   Name Primary?    Chronic right-sided low back pain without sciatica Yes    SI (sacroiliac) joint dysfunction     Decreased strength of trunk and back      Physician: Bettie White PA-C    Visit Date: 2020    Physician Orders: PT Eval and Treat   Medical Diagnosis from Referral:     M47.816 (ICD-10-CM) - Lumbar spondylosis    M51.26 (ICD-10-CM) - Lumbar discogenic pain syndrome  Evaluation Date: 2020  Authorization Period Expiration: 2020  Plan of Care Expiration: 2020  Reassessment Due: 2020  Visit # / Visits authorized:        Time In: 8:45  Time Out: 9:35  Total Billable Time: 50 minutes    Precautions: Standard and injections at the low back, RFA    Pattern of pain determined: 1    Subjective   Jason reports improvement of symptoms since evaluation, he states the majority of his pain is when he is sitting long periods at work.     Patient reports tolerating previous visit well, slightly sore after last   Patient reports their pain to be 2/10 on a 0-10 scale with 0 being no pain and 10 being the worst pain imaginable.  Pain Location: low back R hip     Occupation: Engineering   Leisure: Walks, tennis  Pts goals: Be pain free, be able to pick stuff up.    Objective     Baseline IM Testing Results:   Date of testin2020  ROM 0-42 deg   Max Peak Torque 202    Min Peak Torque 93    Flex/Ext Ratio 2.17   % below normative data 24       Outcomes:  Initial score:43%  Visit 5 score:  Goal: 34%      Treatment    Pt was instructed in and performed the following:     Jason received therapeutic exercises to develop/improved posture, cardiovascular endurance, muscular endurance, lumbar/cervical ROM, strength and muscular endurance for 45 minutes including the following exercises:     [facility] 2020   Visit Number 2   VAS  Pain Rating 2   Treadmill Time (in min.) 5   Speed 3   Incline -   Lumbar Stretches - Slouch Overcorrection -   Extension in Lying 10   Extension in Standing -   Flexion in Lying 10   Flexion in Sitting -   Lumbar Extension Seat Pad -   Femur Restraint -   Top Dead Center -   Counterweight -   Lumbar Flexion -   Lumbar Extension -   Lumbar Peak Torque -   Min Torque -   Test Percent Below Normative Data -   Lumbar Weight 100   Repetitions 15   Rating of Perceived Exertion 3   Ice - Z Lie (in min.) 5     Flexion in lying 10x  Extension in lying 10x  Bridges with march x10  Clamshells x20 BLE        Peripheral muscle strengthening which included 1 set of 15-20 repetitions at a slow, controlled 10-13 second per rep pace focused on strengthening supporting musculature for improved body mechanics and functional mobility.  Pt and therapist focused on proper form during treatment to ensure optimal strengthening of each targeted muscle group.  Machines were utilized including torso rotation, leg extension, leg curl, chest press, upright row. Tricep extension, bicep curl, leg press, and hip abduction added visit 3        Home Exercises Provided and Patient Education Provided     Education provided:   - cont prior HEP    Written Home Exercises Provided: Patient instructed to cont prior HEP.  Exercises were reviewed and Jason was able to demonstrate them prior to the end of the session.  Jason demonstrated good  understanding of the education provided.     See EMR under Patient Instructions for exercises provided prior visit.          Assessment    Pt with no increased symptoms during treatment session today. He reports that his hip pain has improved since evaluation and he cont with SI MET since then. Innominate rotation reassessed this treatment session and no deviations observed. He reports that he uses a lumbar roll in the car, suggested that he bring it into work and use it at his desk to see if symptoms decrease.      Patient is making good progress towards established goals.  Pt will continue to benefit from skilled outpatient physical therapy to address the deficits stated in the impairment chart, provide pt/family education and to maximize pt's level of independence in the home and community environment.     Anticipated Barriers for therapy: none  Pt's spiritual, cultural and educational needs considered and pt agreeable to plan of care and goals as stated below:         Goals: Short term goals:  6 weeks or 10 visits   1.  Pt will demonstrate increased lumbar ROM by at least 3 degrees from the initial ROM value with improvements noted in functional ROM and ability to perform ADLs.  2.  Pt will demonstrate increased MedX average isometric strength value  by 10% from initial test resulting in improved ability to perform bending, lifting, and carrying activities safely, confidently.     3.  Patient report a reduction in worst pain score by 1-2 points for improved tolerance for static standing.  4.  Pt able to perform HEP correctly with minimal cueing or supervision from therapist to encourage independent management of symptoms.         Long term goals: 10 weeks or 20 visits   1. Pt will demonstrate increased lumbar ROM by at least 6 degrees from initial ROM value, resulting in improved ability to perform functional fwd bending while standing and sitting.   2. Pt will demonstrate increased MedX average isometric strength value  by 20% from initial test resulting in improved ability to perform bending, lifting, and carrying activities safely, confidently.  3. Pt to demonstrate ability to independently control and reduce their pain through posture positioning and mechanical movements throughout a typical day.  4.  Pt will demonstrate reduced pain and improved functional outcomes as reported on the FOTO by reaching a limitation score of < or = 35% or less in order to demonstrate subjective improvement in pt's condition.    5. Pt  will demonstrate independence with the HEP at discharge  6.  Pt will be able to sit in one position for 30 - 60min without increased pain.        Plan   Continue with established Plan of Care towards established PT goals.     Luciano Shah, PT  8/17/2020

## 2020-08-21 ENCOUNTER — CLINICAL SUPPORT (OUTPATIENT)
Dept: REHABILITATION | Facility: OTHER | Age: 50
End: 2020-08-21
Attending: PHYSICIAN ASSISTANT
Payer: COMMERCIAL

## 2020-08-21 DIAGNOSIS — M54.50 CHRONIC RIGHT-SIDED LOW BACK PAIN WITHOUT SCIATICA: Primary | ICD-10-CM

## 2020-08-21 DIAGNOSIS — G89.29 CHRONIC RIGHT-SIDED LOW BACK PAIN WITHOUT SCIATICA: Primary | ICD-10-CM

## 2020-08-21 DIAGNOSIS — M53.3 SI (SACROILIAC) JOINT DYSFUNCTION: ICD-10-CM

## 2020-08-21 DIAGNOSIS — R29.898 DECREASED STRENGTH OF TRUNK AND BACK: ICD-10-CM

## 2020-08-21 PROCEDURE — 97110 THERAPEUTIC EXERCISES: CPT | Mod: CQ

## 2020-08-21 NOTE — PROGRESS NOTES
[facility]      Name: Jason Dutton  Clinic Number: [id number]    Therapy Diagnosis:   Encounter Diagnoses   Name Primary?    Chronic right-sided low back pain without sciatica Yes    SI (sacroiliac) joint dysfunction     Decreased strength of trunk and back      Physician: Bettie White PA-C    Visit Date: 2020    Physician Orders: PT Eval and Treat   Medical Diagnosis from Referral:     M47.816 (ICD-10-CM) - Lumbar spondylosis    M51.26 (ICD-10-CM) - Lumbar discogenic pain syndrome  Evaluation Date: 2020  Authorization Period Expiration: 2020  Plan of Care Expiration: 2020  Reassessment Due: 2020  Visit # / Visits authorized: 3/ 20       Time In: 8:45  Time Out: 9:30  Total Billable Time: 40 minutes    Precautions: Standard and injections at the low back, RFA    Pattern of pain determined: 1    Subjective   Jason reports improvement of symptoms since evaluation, he states the majority of his pain is when he is sitting long periods at work.     Patient reports tolerating previous visit well, slightly sore after last   Patient reports their pain to be 2/10 on a 0-10 scale with 0 being no pain and 10 being the worst pain imaginable.  Pain Location: low back R hip     Occupation: Engineering   Leisure: Walks, tennis  Pts goals: Be pain free, be able to pick stuff up.    Objective     Baseline IM Testing Results:   Date of testin2020  ROM 0-42 deg   Max Peak Torque 202    Min Peak Torque 93    Flex/Ext Ratio 2.17   % below normative data 24       Outcomes:  Initial score:43%  Visit 5 score:  Goal: 34%      Treatment    Pt was instructed in and performed the following:     Jason received therapeutic exercises to develop/improved posture, cardiovascular endurance, muscular endurance, lumbar/cervical ROM, strength and muscular endurance for 45 minutes including the following exercises:     Flexion in lying 10x  Extension in lying 10x  Bridges  with march x10  Clamshells x20 BLE  PPT 10x  [facility] 8/21/2020   Visit Number 3   VAS Pain Rating 0   Treadmill Time (in min.) 5   Speed 3   Incline -   Lumbar Stretches - Slouch Overcorrection -   Extension in Lying -   Extension in Standing -   Flexion in Lying -   Flexion in Sitting -   Lumbar Extension Seat Pad -   Femur Restraint -   Top Dead Center -   Counterweight -   Lumbar Flexion -   Lumbar Extension -   Lumbar Peak Torque -   Min Torque -   Test Percent Below Normative Data -   Lumbar Weight 100   Repetitions 20   Rating of Perceived Exertion 3   Ice - Z Lie (in min.) 5         Peripheral muscle strengthening which included 1 set of 15-20 repetitions at a slow, controlled 10-13 second per rep pace focused on strengthening supporting musculature for improved body mechanics and functional mobility.  Pt and therapist focused on proper form during treatment to ensure optimal strengthening of each targeted muscle group.  Machines were utilized including torso rotation, leg extension, leg curl, chest press, upright row. Tricep extension, bicep curl, leg press, and hip abduction added visit 3        Home Exercises Provided and Patient Education Provided   Has lumbar roll in car  Education provided:   - cont prior HEP    Written Home Exercises Provided: Patient instructed to cont prior HEP.  Exercises were reviewed and Jason was able to demonstrate them prior to the end of the session.  Jason demonstrated good  understanding of the education provided.     See EMR under Patient Instructions for exercises provided prior visit.          Assessment    Pt with no increased symptoms during treatment session today. He demo his HEP well with no c/o pain. Added PPT due to pt feels a good stretch. Innominate rotation reassessed this treatment session and no deviations observed. He tolerated the lumbar machine with no c/o LBP. He was at [facility] before and he is happy to be back again.     Patient is making good  progress towards established goals.  Pt will continue to benefit from skilled outpatient physical therapy to address the deficits stated in the impairment chart, provide pt/family education and to maximize pt's level of independence in the home and community environment.     Anticipated Barriers for therapy: none  Pt's spiritual, cultural and educational needs considered and pt agreeable to plan of care and goals as stated below:         Goals: Short term goals:  6 weeks or 10 visits   1.  Pt will demonstrate increased lumbar ROM by at least 3 degrees from the initial ROM value with improvements noted in functional ROM and ability to perform ADLs.  2.  Pt will demonstrate increased MedX average isometric strength value  by 10% from initial test resulting in improved ability to perform bending, lifting, and carrying activities safely, confidently.     3.  Patient report a reduction in worst pain score by 1-2 points for improved tolerance for static standing.  4.  Pt able to perform HEP correctly with minimal cueing or supervision from therapist to encourage independent management of symptoms.         Long term goals: 10 weeks or 20 visits   1. Pt will demonstrate increased lumbar ROM by at least 6 degrees from initial ROM value, resulting in improved ability to perform functional fwd bending while standing and sitting.   2. Pt will demonstrate increased MedX average isometric strength value  by 20% from initial test resulting in improved ability to perform bending, lifting, and carrying activities safely, confidently.  3. Pt to demonstrate ability to independently control and reduce their pain through posture positioning and mechanical movements throughout a typical day.  4.  Pt will demonstrate reduced pain and improved functional outcomes as reported on the FOTO by reaching a limitation score of < or = 35% or less in order to demonstrate subjective improvement in pt's condition.    5. Pt will demonstrate  independence with the HEP at discharge  6.  Pt will be able to sit in one position for 30 - 60min without increased pain.        Plan   Continue with established Plan of Care towards established PT goals.     Cristina Muller, PTA  8/21/2020

## 2020-08-24 ENCOUNTER — CLINICAL SUPPORT (OUTPATIENT)
Dept: REHABILITATION | Facility: OTHER | Age: 50
End: 2020-08-24
Attending: PHYSICIAN ASSISTANT
Payer: COMMERCIAL

## 2020-08-24 DIAGNOSIS — R29.898 DECREASED STRENGTH OF TRUNK AND BACK: ICD-10-CM

## 2020-08-24 DIAGNOSIS — M54.50 CHRONIC RIGHT-SIDED LOW BACK PAIN WITHOUT SCIATICA: Primary | ICD-10-CM

## 2020-08-24 DIAGNOSIS — M53.3 SI (SACROILIAC) JOINT DYSFUNCTION: ICD-10-CM

## 2020-08-24 DIAGNOSIS — G89.29 CHRONIC RIGHT-SIDED LOW BACK PAIN WITHOUT SCIATICA: Primary | ICD-10-CM

## 2020-08-24 PROCEDURE — 97110 THERAPEUTIC EXERCISES: CPT

## 2020-08-27 NOTE — PROGRESS NOTES
" [facility]      Name: Jason Dutton  Clinic Number: [id number]    Therapy Diagnosis:   Encounter Diagnoses   Name Primary?    Chronic right-sided low back pain without sciatica Yes    SI (sacroiliac) joint dysfunction     Decreased strength of trunk and back      Physician: Bettie White PA-C    Visit Date: 2020    Physician Orders: PT Eval and Treat   Medical Diagnosis from Referral:     M47.816 (ICD-10-CM) - Lumbar spondylosis    M51.26 (ICD-10-CM) - Lumbar discogenic pain syndrome  Evaluation Date: 2020  Authorization Period Expiration: 2020  Plan of Care Expiration: 2020  Reassessment Due: 2020  Visit # / Visits authorized:        Time In: 9:00  Time Out: 9:45  Total Billable Time: 45 minutes    Precautions: Standard and injections at the low back, RFA    Pattern of pain determined: 1    Subjective   Jason reports felt his back "tweak" earlier this week.and he felt tight. Reports he was able to do some of the HEP. He states he is feeling better now    Patient reports tolerating previous visit well, slightly sore after last   Patient reports their pain to be 2/10 on a 0-10 scale with 0 being no pain and 10 being the worst pain imaginable.  Pain Location: low back R hip     Occupation: Engineering   Leisure: Walks, tennis  Pts goals: Be pain free, be able to pick stuff up.    Objective     Baseline IM Testing Results:   Date of testin2020  ROM 0-42 deg   Max Peak Torque 202    Min Peak Torque 93    Flex/Ext Ratio 2.17   % below normative data 24       Outcomes:  Initial score:43%  Visit 5 score:  Goal: 34%      Treatment    Pt was instructed in and performed the following:     Jason received therapeutic exercises to develop/improved posture, cardiovascular endurance, muscular endurance, lumbar/cervical ROM, strength and muscular endurance for 45 minutes including the following exercises:     Flexion in lying 10x  LTR " x10  Extension in lying 10x  Bridges with march x10  Clamshells x20 BLE  PPT 10x (he does more of a TA contraction)  Seated slouch to [id number]    [facility] 8/28/2020   Visit Number 5   VAS Pain Rating 2   Treadmill Time (in min.) 5   Speed 3   Incline -   Lumbar Stretches - Slouch Overcorrection 10   Extension in Lying 10   Extension in Standing -   Flexion in Lying 10   Flexion in Sitting -   Lumbar Extension Seat Pad -   Femur Restraint -   Top Dead Center -   Counterweight -   Lumbar Flexion -   Lumbar Extension -   Lumbar Peak Torque -   Min Torque -   Test Percent Below Normative Data -   Lumbar Weight 110   Repetitions 20   Rating of Perceived Exertion 4   Ice - Z Lie (in min.) 5         Peripheral muscle strengthening which included 1 set of 15-20 repetitions at a slow, controlled 10-13 second per rep pace focused on strengthening supporting musculature for improved body mechanics and functional mobility.  Pt and therapist focused on proper form during treatment to ensure optimal strengthening of each targeted muscle group.  Machines were utilized including torso rotation, leg extension, leg curl, chest press, upright row. Tricep extension, bicep curl, leg press, and hip abduction added visit 3      Home Exercises Provided and Patient Education Provided   Has lumbar roll in car  Education provided:   - cont prior HEP    Written Home Exercises Provided: Patient instructed to cont prior HEP.  Exercises were reviewed and Jason was able to demonstrate them prior to the end of the session.  Jason demonstrated good  understanding of the education provided.     See EMR under Patient Instructions for exercises provided prior visit.          Assessment   Pt educated to complete stretches after next flare up to report if symptoms decrease with extension. He required min verbal cues when completing prone extension to allow stomach sag. Increased resistance on the lumbar medx by 5% and he was able to complete  20 reps, will continue to progress as tolerated.    Patient is making good progress towards established goals.  Pt will continue to benefit from skilled outpatient physical therapy to address the deficits stated in the impairment chart, provide pt/family education and to maximize pt's level of independence in the home and community environment.     Anticipated Barriers for therapy: none  Pt's spiritual, cultural and educational needs considered and pt agreeable to plan of care and goals as stated below:         Goals: Short term goals:  6 weeks or 10 visits   1.  Pt will demonstrate increased lumbar ROM by at least 3 degrees from the initial ROM value with improvements noted in functional ROM and ability to perform ADLs. (appropriate, progressing)  2.  Pt will demonstrate increased MedX average isometric strength value  by 10% from initial test resulting in improved ability to perform bending, lifting, and carrying activities safely, confidently. (appropriate, progressing)   3.  Patient report a reduction in worst pain score by 1-2 points for improved tolerance for static standing. (appropriate, progressing)  4.  Pt able to perform HEP correctly with minimal cueing or supervision from therapist to encourage independent management of symptoms. (appropriate, progressing)      Long term goals: 10 weeks or 20 visits   1. Pt will demonstrate increased lumbar ROM by at least 6 degrees from initial ROM value, resulting in improved ability to perform functional fwd bending while standing and sitting. (appropriate, progressing)  2. Pt will demonstrate increased MedX average isometric strength value  by 20% from initial test resulting in improved ability to perform bending, lifting, and carrying activities safely, confidently. (appropriate, progressing)  3. Pt to demonstrate ability to independently control and reduce their pain through posture positioning and mechanical movements throughout a typical day. (appropriate,  progressing)  4.  Pt will demonstrate reduced pain and improved functional outcomes as reported on the FOTO by reaching a limitation score of < or = 35% or less in order to demonstrate subjective improvement in pt's condition.  (appropriate, progressing)  5. Pt will demonstrate independence with the HEP at discharge (appropriate, progressing)  6.  Pt will be able to sit in one position for 30 - 60min without increased pain. (appropriate, progressing)      Plan   Continue with established Plan of Care towards established PT goals.     Luciano Shah, PT  8/28/2020

## 2020-08-28 ENCOUNTER — CLINICAL SUPPORT (OUTPATIENT)
Dept: REHABILITATION | Facility: OTHER | Age: 50
End: 2020-08-28
Attending: PHYSICIAN ASSISTANT
Payer: COMMERCIAL

## 2020-08-28 DIAGNOSIS — G89.29 CHRONIC RIGHT-SIDED LOW BACK PAIN WITHOUT SCIATICA: Primary | ICD-10-CM

## 2020-08-28 DIAGNOSIS — R29.898 DECREASED STRENGTH OF TRUNK AND BACK: ICD-10-CM

## 2020-08-28 DIAGNOSIS — M54.50 CHRONIC RIGHT-SIDED LOW BACK PAIN WITHOUT SCIATICA: Primary | ICD-10-CM

## 2020-08-28 DIAGNOSIS — M53.3 SI (SACROILIAC) JOINT DYSFUNCTION: ICD-10-CM

## 2020-08-28 PROCEDURE — 97110 THERAPEUTIC EXERCISES: CPT

## 2020-08-31 ENCOUNTER — CLINICAL SUPPORT (OUTPATIENT)
Dept: REHABILITATION | Facility: OTHER | Age: 50
End: 2020-08-31
Attending: PHYSICIAN ASSISTANT
Payer: COMMERCIAL

## 2020-08-31 DIAGNOSIS — G89.29 CHRONIC RIGHT-SIDED LOW BACK PAIN WITHOUT SCIATICA: Primary | ICD-10-CM

## 2020-08-31 DIAGNOSIS — M54.50 CHRONIC RIGHT-SIDED LOW BACK PAIN WITHOUT SCIATICA: Primary | ICD-10-CM

## 2020-08-31 DIAGNOSIS — M53.3 SI (SACROILIAC) JOINT DYSFUNCTION: ICD-10-CM

## 2020-08-31 DIAGNOSIS — R29.898 DECREASED STRENGTH OF TRUNK AND BACK: ICD-10-CM

## 2020-08-31 PROCEDURE — 97110 THERAPEUTIC EXERCISES: CPT

## 2020-08-31 NOTE — PROGRESS NOTES
[facility]      Name: Jason Dutton  Clinic Number: [id number]    Therapy Diagnosis:   Encounter Diagnoses   Name Primary?    Chronic right-sided low back pain without sciatica Yes    SI (sacroiliac) joint dysfunction     Decreased strength of trunk and back      Physician: Bettie White PA-C    Visit Date: 2020    Physician Orders: PT Eval and Treat   Medical Diagnosis from Referral:     M47.816 (ICD-10-CM) - Lumbar spondylosis    M51.26 (ICD-10-CM) - Lumbar discogenic pain syndrome  Evaluation Date: 2020  Authorization Period Expiration: 2020  Plan of Care Expiration: 2020  Reassessment Due: 2020  Visit # / Visits authorized:        Time In: 8:45  Time Out: 9:30  Total Billable Time: 45 minutes    Precautions: Standard and injections at the low back, RFA    Pattern of pain determined: 1    Subjective   Jason denies having discomfort at this time and states that even a slight bend does not bother his back as much. Pt states that he would like a plan for when he finishes with therapy    Patient reports tolerating previous visit well, slightly sore after last   Patient reports their pain to be 1/10 on a 0-10 scale with 0 being no pain and 10 being the worst pain imaginable.  Pain Location: low back R hip     Occupation: Engineering   Leisure: Walks, tennis  Pts goals: Be pain free, be able to pick stuff up.    Objective     Baseline IM Testing Results:   Date of testin2020  ROM 0-42 deg   Max Peak Torque 202    Min Peak Torque 93    Flex/Ext Ratio 2.17   % below normative data 24       Outcomes:  Initial score:43%  Visit 5 score:  Goal: 34%      Treatment    Pt was instructed in and performed the following:     Jason received therapeutic exercises to develop/improved posture, cardiovascular endurance, muscular endurance, lumbar/cervical ROM, strength and muscular endurance for 45 minutes including the following exercises:     Flexion  in lying 10x  LTR x10  Extension in lying 10x  Bridges with march x10  Clamshells x15 with green tb BLE  Seated slouch to correct x10  Alternating prone superman 10x  Anti rotation green TB 15x  Piriformis stretch 30 x 2    [facility] 8/31/2020   Visit Number 6   VAS Pain Rating 1   Treadmill Time (in min.) 5   Speed 3   Incline -   Lumbar Stretches - Slouch Overcorrection 10   Extension in Lying 10   Extension in Standing -   Flexion in Lying 10   Flexion in Sitting -   Test Percent Below Normative Data -   Lumbar Weight 116   Repetitions 20   Rating of Perceived Exertion 3   Ice - Z Lie (in min.) 5           Peripheral muscle strengthening which included 1 set of 15-20 repetitions at a slow, controlled 10-13 second per rep pace focused on strengthening supporting musculature for improved body mechanics and functional mobility.  Pt and therapist focused on proper form during treatment to ensure optimal strengthening of each targeted muscle group.  Machines were utilized including torso rotation, leg extension, leg curl, chest press, upright row. Tricep extension, bicep curl, leg press, and hip abduction added visit 3      Home Exercises Provided and Patient Education Provided   Has lumbar roll in car  Education provided:   - cont prior HEP    Written Home Exercises Provided: Patient instructed to cont prior HEP.  Exercises were reviewed and Jason was able to demonstrate them prior to the end of the session.  Jason demonstrated good  understanding of the education provided.     See EMR under Patient Instructions for exercises provided prior visit.          Assessment   Pt able to complete therapy this visit without increase in pain or discomfort in the low back. Added theraband to clamshell exercise, core strengthening in prone  exercises this visit and glute stretch. Pt was able to complete exercises without increased pain and some exertion. Increased resistance on the lumbar medx by 5% at 15 repetitions and  3/10 RPE. Will continue to progress as tolerated.     Patient is making good progress towards established goals.  Pt will continue to benefit from skilled outpatient physical therapy to address the deficits stated in the impairment chart, provide pt/family education and to maximize pt's level of independence in the home and community environment.     Anticipated Barriers for therapy: none  Pt's spiritual, cultural and educational needs considered and pt agreeable to plan of care and goals as stated below:         Goals: Short term goals:  6 weeks or 10 visits   1.  Pt will demonstrate increased lumbar ROM by at least 3 degrees from the initial ROM value with improvements noted in functional ROM and ability to perform ADLs. (appropriate, progressing)  2.  Pt will demonstrate increased MedX average isometric strength value  by 10% from initial test resulting in improved ability to perform bending, lifting, and carrying activities safely, confidently. (appropriate, progressing)   3.  Patient report a reduction in worst pain score by 1-2 points for improved tolerance for static standing. (appropriate, progressing)  4.  Pt able to perform HEP correctly with minimal cueing or supervision from therapist to encourage independent management of symptoms. (appropriate, progressing)      Long term goals: 10 weeks or 20 visits   1. Pt will demonstrate increased lumbar ROM by at least 6 degrees from initial ROM value, resulting in improved ability to perform functional fwd bending while standing and sitting. (appropriate, progressing)  2. Pt will demonstrate increased MedX average isometric strength value  by 20% from initial test resulting in improved ability to perform bending, lifting, and carrying activities safely, confidently. (appropriate, progressing)  3. Pt to demonstrate ability to independently control and reduce their pain through posture positioning and mechanical movements throughout a typical day. (appropriate,  progressing)  4.  Pt will demonstrate reduced pain and improved functional outcomes as reported on the FOTO by reaching a limitation score of < or = 35% or less in order to demonstrate subjective improvement in pt's condition.  (appropriate, progressing)  5. Pt will demonstrate independence with the HEP at discharge (appropriate, progressing)  6.  Pt will be able to sit in one position for 30 - 60min without increased pain. (appropriate, progressing)      Plan   Continue with established Plan of Care towards established PT goals.     Alida Talbot, PT  8/31/2020

## 2020-09-03 ENCOUNTER — CLINICAL SUPPORT (OUTPATIENT)
Dept: REHABILITATION | Facility: OTHER | Age: 50
End: 2020-09-03
Attending: PHYSICIAN ASSISTANT
Payer: COMMERCIAL

## 2020-09-03 DIAGNOSIS — G89.29 CHRONIC RIGHT-SIDED LOW BACK PAIN WITHOUT SCIATICA: Primary | ICD-10-CM

## 2020-09-03 DIAGNOSIS — M53.3 SI (SACROILIAC) JOINT DYSFUNCTION: ICD-10-CM

## 2020-09-03 DIAGNOSIS — M54.50 CHRONIC RIGHT-SIDED LOW BACK PAIN WITHOUT SCIATICA: Primary | ICD-10-CM

## 2020-09-03 DIAGNOSIS — R29.898 DECREASED STRENGTH OF TRUNK AND BACK: ICD-10-CM

## 2020-09-03 PROCEDURE — 97110 THERAPEUTIC EXERCISES: CPT | Mod: CQ

## 2020-09-03 NOTE — PROGRESS NOTES
[facility]      Name: Jason Dutton  Clinic Number: [id number]    Therapy Diagnosis:   Encounter Diagnoses   Name Primary?    Chronic right-sided low back pain without sciatica Yes    SI (sacroiliac) joint dysfunction     Decreased strength of trunk and back      Physician: Bettie White PA-C    Visit Date: 9/3/2020    Physician Orders: PT Eval and Treat   Medical Diagnosis from Referral:     M47.816 (ICD-10-CM) - Lumbar spondylosis    M51.26 (ICD-10-CM) - Lumbar discogenic pain syndrome  Evaluation Date: 2020  Authorization Period Expiration: 2020  Plan of Care Expiration: 2020  Reassessment Due: 2020  Visit # / Visits authorized:        Time In: 8:35  Time Out: 9:35  Total Billable Time: 50 minutes    Precautions: Standard and injections at the low back, RFA    Pattern of pain determined: 1    Subjective   Jason reports feeling good yesterday, but has a little R lower back discomfort today.     Patient reports tolerating previous visit well, slightly sore after last   Patient reports their pain to be 2/10 on a 0-10 scale with 0 being no pain and 10 being the worst pain imaginable.  Pain Location: low back R hip     Occupation: Engineering   Leisure: Walks, tennis  Pts goals: Be pain free, be able to pick stuff up.    Objective     Baseline IM Testing Results:   Date of testin2020  ROM 0-42 deg   Max Peak Torque 202    Min Peak Torque 93    Flex/Ext Ratio 2.17   % below normative data 24       Outcomes:  Initial score:43%  Visit 5 score:  Goal: 34%      Treatment    Pt was instructed in and performed the following:     Jason received therapeutic exercises to develop/improved posture, cardiovascular endurance, muscular endurance, lumbar/cervical ROM, strength and muscular endurance for 60 minutes including the following exercises:     Flexion in lying 10x  LTR x10  Extension in lying 10x  Bridges with march x1  Clamshells x15 with green  tb BLE  Seated slouch to correct x10  Alternating prone superman 10x  Anti rotation green TB 20x  Piriformis stretch 30 x 2  [facility] 9/3/2020   Visit Number 7   VAS Pain Rating 2   Treadmill Time (in min.) 5   Speed 3   Lumbar Weight 122   Repetitions 20   Rating of Perceived Exertion 3   Ice - Z Lie (in min.) 5             Peripheral muscle strengthening which included 1 set of 15-20 repetitions at a slow, controlled 10-13 second per rep pace focused on strengthening supporting musculature for improved body mechanics and functional mobility.  Pt and therapist focused on proper form during treatment to ensure optimal strengthening of each targeted muscle group.  Machines were utilized including torso rotation, leg extension, leg curl, chest press, upright row. Tricep extension, bicep curl, leg press, and hip abduction added visit 3      Home Exercises Provided and Patient Education Provided   Has lumbar roll in car  Education provided:   - cont prior HEP    Written Home Exercises Provided: Patient instructed to cont prior HEP.  Exercises were reviewed and Jason was able to demonstrate them prior to the end of the session.  Jason demonstrated good  understanding of the education provided.     See EMR under Patient Instructions for exercises provided prior visit.          Assessment   Pt able to complete therapy this visit without increase in pain or discomfort in the low back.  He felt better and his LBP always decrees with his HEP. Pt was able to complete exercises without increased pain and some exertion. Increased resistance on the lumbar medx by 5% at 20 repetitions and 3/10 RPE. Will continue to progress as tolerated.     Patient is making good progress towards established goals.  Pt will continue to benefit from skilled outpatient physical therapy to address the deficits stated in the impairment chart, provide pt/family education and to maximize pt's level of independence in the home and community  environment.     Anticipated Barriers for therapy: none  Pt's spiritual, cultural and educational needs considered and pt agreeable to plan of care and goals as stated below:         Goals: Short term goals:  6 weeks or 10 visits   1.  Pt will demonstrate increased lumbar ROM by at least 3 degrees from the initial ROM value with improvements noted in functional ROM and ability to perform ADLs. (appropriate, progressing)  2.  Pt will demonstrate increased MedX average isometric strength value  by 10% from initial test resulting in improved ability to perform bending, lifting, and carrying activities safely, confidently. (appropriate, progressing)   3.  Patient report a reduction in worst pain score by 1-2 points for improved tolerance for static standing. (appropriate, progressing)  4.  Pt able to perform HEP correctly with minimal cueing or supervision from therapist to encourage independent management of symptoms. (appropriate, progressing)      Long term goals: 10 weeks or 20 visits   1. Pt will demonstrate increased lumbar ROM by at least 6 degrees from initial ROM value, resulting in improved ability to perform functional fwd bending while standing and sitting. (appropriate, progressing)  2. Pt will demonstrate increased MedX average isometric strength value  by 20% from initial test resulting in improved ability to perform bending, lifting, and carrying activities safely, confidently. (appropriate, progressing)  3. Pt to demonstrate ability to independently control and reduce their pain through posture positioning and mechanical movements throughout a typical day. (appropriate, progressing)  4.  Pt will demonstrate reduced pain and improved functional outcomes as reported on the FOTO by reaching a limitation score of < or = 35% or less in order to demonstrate subjective improvement in pt's condition.  (appropriate, progressing)  5. Pt will demonstrate independence with the HEP at discharge (appropriate,  progressing)  6.  Pt will be able to sit in one position for 30 - 60min without increased pain. (appropriate, progressing)      Plan   Continue with established Plan of Care towards established PT goals.     Cristina Muller, PTA  9/3/2020

## 2020-09-08 ENCOUNTER — CLINICAL SUPPORT (OUTPATIENT)
Dept: REHABILITATION | Facility: OTHER | Age: 50
End: 2020-09-08
Attending: PHYSICIAN ASSISTANT
Payer: COMMERCIAL

## 2020-09-08 DIAGNOSIS — R29.898 DECREASED STRENGTH OF TRUNK AND BACK: ICD-10-CM

## 2020-09-08 DIAGNOSIS — G89.29 CHRONIC RIGHT-SIDED LOW BACK PAIN WITHOUT SCIATICA: Primary | ICD-10-CM

## 2020-09-08 DIAGNOSIS — M53.3 SI (SACROILIAC) JOINT DYSFUNCTION: ICD-10-CM

## 2020-09-08 DIAGNOSIS — M54.50 CHRONIC RIGHT-SIDED LOW BACK PAIN WITHOUT SCIATICA: Primary | ICD-10-CM

## 2020-09-08 PROCEDURE — 97110 THERAPEUTIC EXERCISES: CPT

## 2020-09-08 NOTE — PROGRESS NOTES
[facility]      Name: Jason Dutton  Clinic Number: [id number]    Therapy Diagnosis:   Encounter Diagnoses   Name Primary?    Chronic right-sided low back pain without sciatica Yes    SI (sacroiliac) joint dysfunction     Decreased strength of trunk and back      Physician: Bettie White PA-C    Visit Date: 2020    Physician Orders: PT Eval and Treat   Medical Diagnosis from Referral:     M47.816 (ICD-10-CM) - Lumbar spondylosis    M51.26 (ICD-10-CM) - Lumbar discogenic pain syndrome  Evaluation Date: 2020  Authorization Period Expiration: 2020  Plan of Care Expiration: 2020  Reassessment Due: 2020  Visit # / Visits authorized:        Time In: 8:58pm (pt 15+ min late)  Time Out: 9:35  Total Billable Time: 45 minutes    Precautions: Standard and injections at the low back, RFA    Pattern of pain determined: 1    Subjective   Jason reports no big flare up since last visit including driving 8 hrs. Also, pt report cont to marita inc sx in LB /c stretch and, for improved sleep, take Advil.        Patient reports tolerating previous visit well /c no sig soreness.    Patient reports their pain to be 2/10 on a 0-10 scale with 0 being no pain and 10 being the worst pain imaginable.  Pain Location: low back R hip     Occupation: Engineering   Leisure: Walks, tennis  Pts goals: Be pain free, be able to pick stuff up.    Objective     Baseline IM Testing Results:   Date of testin2020  ROM 0-42 deg   Max Peak Torque 202    Min Peak Torque 93    Flex/Ext Ratio 2.17   % below normative data 24       Outcomes:  Initial score:43%  Visit 5 score (20): 46%  Goal: 34%      Treatment    Pt was instructed in and performed the following:     Jason received therapeutic exercises to develop/improved posture, cardiovascular endurance, muscular endurance, lumbar/cervical ROM, strength and muscular endurance for 60 minutes including the following  exercises:     LTR x10  Extension in lying 10x2  Piriformis stretch 4 x 20 s hold each side  Heel tap from BUE in table top x10 each side alternating   Bridges with knee ext  x10 each side alternating   Clamshells x15 with green tb BLE, from supine to challenge CL LE to stabilize   Alternating prone superman 10x      Seated slouch to correct x10  NP  Anti rotation green TB 20x NP  Flexion in lying 10x  NP        [facility] 9/8/2020   Visit Number 8   VAS Pain Rating 2   Treadmill Time (in min.) 5   Speed 3.4   Incline -   Lumbar Stretches - Slouch Overcorrection -   Extension in Lying 20   Extension in Standing -   Flexion in Lying -   Flexion in Sitting -   Lumbar Extension Seat Pad -   Femur Restraint -   Top Dead Center -   Counterweight -   Lumbar Flexion -   Lumbar Extension -   Lumbar Peak Torque -   Min Torque -   Test Percent Below Normative Data -   Lumbar Weight 128   Repetitions 20   Rating of Perceived Exertion 3   Ice - Z Lie (in min.) 5           Peripheral muscle strengthening which included 1 set of 15-20 repetitions at a slow, controlled 10-13 second per rep pace focused on strengthening supporting musculature for improved body mechanics and functional mobility.  Pt and therapist focused on proper form during treatment to ensure optimal strengthening of each targeted muscle group.  Machines were utilized including torso rotation, leg extension, leg curl, chest press, upright row. Tricep extension, bicep curl, leg press, and hip abduction added visit 3      Home Exercises Provided and Patient Education Provided   Has lumbar roll in car  Education provided:   - cont prior HEP  -Pt ed on inc neuromotor control/signal efficiency /c muscle leading to inc strength vs long term physiologic muscle changes and potential for slowed down comfort /c MedX lumbar weight inc being expected     Written Home Exercises Provided: Patient instructed to cont prior HEP.  Exercises were reviewed and Jason was able  to demonstrate them prior to the end of the session.  Jason demonstrated good  understanding of the education provided.     See EMR under Patient Instructions for exercises provided prior visit.        Assessment   Pt report inc functional activity tolerance especially for sitting as evidenced by performing 8 hr drive /s sig inc familiar LBP.  Also, per pt report, pt demo inc sx self management /c application of stretches and, for inc sleep, OTC meds. Performed clamshells in supine to require CL LE stabilization for inc challenge.  Pt progressing well therefore recommend cont progressing difficulty /c core stability exs to cont challenge ex  Increased resistance on the lumbar MedX by 5% to 128lb.  Pt complete 20 reps at 3/10 RPE, therefore, plan to progress 5% as tolerated.     Patient is making good progress towards established goals.  Pt will continue to benefit from skilled outpatient physical therapy to address the deficits stated in the impairment chart, provide pt/family education and to maximize pt's level of independence in the home and community environment.     Anticipated Barriers for therapy: none  Pt's spiritual, cultural and educational needs considered and pt agreeable to plan of care and goals as stated below:     Goals: Short term goals:  6 weeks or 10 visits   1.  Pt will demonstrate increased lumbar ROM by at least 3 degrees from the initial ROM value with improvements noted in functional ROM and ability to perform ADLs. (appropriate, progressing)  2.  Pt will demonstrate increased MedX average isometric strength value  by 10% from initial test resulting in improved ability to perform bending, lifting, and carrying activities safely, confidently. (appropriate, progressing)   3.  Patient report a reduction in worst pain score by 1-2 points for improved tolerance for static standing. (appropriate, progressing)  4.  Pt able to perform HEP correctly with minimal cueing or supervision from therapist to  encourage independent management of symptoms. (appropriate, progressing)      Long term goals: 10 weeks or 20 visits   1. Pt will demonstrate increased lumbar ROM by at least 6 degrees from initial ROM value, resulting in improved ability to perform functional fwd bending while standing and sitting. (appropriate, progressing)  2. Pt will demonstrate increased MedX average isometric strength value  by 20% from initial test resulting in improved ability to perform bending, lifting, and carrying activities safely, confidently. (appropriate, progressing)  3. Pt to demonstrate ability to independently control and reduce their pain through posture positioning and mechanical movements throughout a typical day. (appropriate, progressing)  4.  Pt will demonstrate reduced pain and improved functional outcomes as reported on the FOTO by reaching a limitation score of < or = 35% or less in order to demonstrate subjective improvement in pt's condition.  (appropriate, progressing)  5. Pt will demonstrate independence with the HEP at discharge (appropriate, progressing)  6.  Pt will be able to sit in one position for 30 - 60min without increased pain. (appropriate, progressing)      Plan   Continue with established Plan of Care towards established PT goals.     Osvaldo Farmer, PT  9/8/2020

## 2020-09-11 ENCOUNTER — CLINICAL SUPPORT (OUTPATIENT)
Dept: REHABILITATION | Facility: OTHER | Age: 50
End: 2020-09-11
Attending: PHYSICIAN ASSISTANT
Payer: COMMERCIAL

## 2020-09-11 DIAGNOSIS — M54.50 CHRONIC RIGHT-SIDED LOW BACK PAIN WITHOUT SCIATICA: Primary | ICD-10-CM

## 2020-09-11 DIAGNOSIS — M53.3 SI (SACROILIAC) JOINT DYSFUNCTION: ICD-10-CM

## 2020-09-11 DIAGNOSIS — R29.898 DECREASED STRENGTH OF TRUNK AND BACK: ICD-10-CM

## 2020-09-11 DIAGNOSIS — G89.29 CHRONIC RIGHT-SIDED LOW BACK PAIN WITHOUT SCIATICA: Primary | ICD-10-CM

## 2020-09-11 PROCEDURE — 97110 THERAPEUTIC EXERCISES: CPT

## 2020-09-11 NOTE — PROGRESS NOTES
[facility]      Name: Jason Dutton  Clinic Number: [id number]    Therapy Diagnosis:   Encounter Diagnoses   Name Primary?    Chronic right-sided low back pain without sciatica Yes    SI (sacroiliac) joint dysfunction     Decreased strength of trunk and back      Physician: Bettie White PA-C    Visit Date: 2020    Physician Orders: PT Eval and Treat   Medical Diagnosis from Referral:     M47.816 (ICD-10-CM) - Lumbar spondylosis    M51.26 (ICD-10-CM) - Lumbar discogenic pain syndrome  Evaluation Date: 2020  Authorization Period Expiration: 2020  Plan of Care Expiration: 2020  Reassessment Due: 2020  Visit # / Visits authorized:        Time In: 8:30   Time Out: :  Total Billable Time: 45 minutes    Precautions: Standard and injections at the low back, RFA    Pattern of pain determined: 1    Subjective   Jason reports that he was having some soreness after last visit but is feeling fine now.       Patient reports tolerating previous visit well /c no sig soreness.    Patient reports their pain to be 2/10 on a 0-10 scale with 0 being no pain and 10 being the worst pain imaginable.  Pain Location: low back R hip     Occupation: Engineering   Leisure: Walks, tennis  Pts goals: Be pain free, be able to pick stuff up.    Objective     Baseline IM Testing Results:   Date of testin2020  ROM 0-42 deg   Max Peak Torque 202    Min Peak Torque 93    Flex/Ext Ratio 2.17   % below normative data 24       Outcomes:  Initial score:43%  Visit 5 score (20): 46%  Goal: 34%      Treatment    Pt was instructed in and performed the following:     Jason received therapeutic exercises to develop/improved posture, cardiovascular endurance, muscular endurance, lumbar/cervical ROM, strength and muscular endurance for 60 minutes including the following exercises:     LTR x10  Extension in lying 10x2  Piriformis stretch 4 x 20 s hold each side  Heel tap  from BUE in table top x10 each side alternating   Bridges with knee ext  x10 each side alternating   Bridges with green TB 15 x  Clamshells x15 with green tb BLE, from supine to challenge CL LE to stabilize   Alternating prone superman 10x    Add Dead bug next visit      Seated slouch to correct x10  NP  Anti rotation green TB 20x NP  Flexion in lying 10x  NP        [facility] 9/11/2020   Visit Number 9   VAS Pain Rating 1   Treadmill Time (in min.) 5   Speed -   Incline -   Lumbar Stretches - Slouch Overcorrection -   Extension in Lying 20   Extension in Standing -   Test Percent Below Normative Data -   Lumbar Weight 135   Repetitions 20   Rating of Perceived Exertion 4   Ice - Z Lie (in min.) 5         Peripheral muscle strengthening which included 1 set of 15-20 repetitions at a slow, controlled 10-13 second per rep pace focused on strengthening supporting musculature for improved body mechanics and functional mobility.  Pt and therapist focused on proper form during treatment to ensure optimal strengthening of each targeted muscle group.  Machines were utilized including torso rotation, leg extension, leg curl, chest press, upright row. Tricep extension, bicep curl, leg press, and hip abduction added visit 3      Home Exercises Provided and Patient Education Provided   Has lumbar roll in car  Education provided:   - cont prior HEP  -Pt ed on inc neuromotor control/signal efficiency /c muscle leading to inc strength vs long term physiologic muscle changes and potential for slowed down comfort /c MedX lumbar weight inc being expected     Written Home Exercises Provided: Patient instructed to cont prior HEP.  Exercises were reviewed and Jason was able to demonstrate them prior to the end of the session.  Jason demonstrated good  understanding of the education provided.     See EMR under Patient Instructions for exercises provided prior visit.        Assessment   Pt reports some soreness after last visit but  feels better overall. Added bridge with Green TB without hip ABD and he reports feeling more elda and it was somewhat easier because he felt like he was activating his glutes.  Pt progressing well therefore recommend cont progressing difficulty /c core stability exs to cont challenge ex  Increased resistance on the lumbar MedX by 5% to 135lb.  Pt complete 20 reps at 4/10 RPE, therefore, plan to progress 5% as tolerated.     Patient is making good progress towards established goals.  Pt will continue to benefit from skilled outpatient physical therapy to address the deficits stated in the impairment chart, provide pt/family education and to maximize pt's level of independence in the home and community environment.     Anticipated Barriers for therapy: none  Pt's spiritual, cultural and educational needs considered and pt agreeable to plan of care and goals as stated below:     Goals: Short term goals:  6 weeks or 10 visits   1.  Pt will demonstrate increased lumbar ROM by at least 3 degrees from the initial ROM value with improvements noted in functional ROM and ability to perform ADLs. (appropriate, progressing)  2.  Pt will demonstrate increased MedX average isometric strength value  by 10% from initial test resulting in improved ability to perform bending, lifting, and carrying activities safely, confidently. (appropriate, progressing)   3.  Patient report a reduction in worst pain score by 1-2 points for improved tolerance for static standing. (appropriate, progressing)  4.  Pt able to perform HEP correctly with minimal cueing or supervision from therapist to encourage independent management of symptoms. (appropriate, progressing)      Long term goals: 10 weeks or 20 visits   1. Pt will demonstrate increased lumbar ROM by at least 6 degrees from initial ROM value, resulting in improved ability to perform functional fwd bending while standing and sitting. (appropriate, progressing)  2. Pt will demonstrate  increased MedX average isometric strength value  by 20% from initial test resulting in improved ability to perform bending, lifting, and carrying activities safely, confidently. (appropriate, progressing)  3. Pt to demonstrate ability to independently control and reduce their pain through posture positioning and mechanical movements throughout a typical day. (appropriate, progressing)  4.  Pt will demonstrate reduced pain and improved functional outcomes as reported on the FOTO by reaching a limitation score of < or = 35% or less in order to demonstrate subjective improvement in pt's condition.  (appropriate, progressing)  5. Pt will demonstrate independence with the HEP at discharge (appropriate, progressing)  6.  Pt will be able to sit in one position for 30 - 60min without increased pain. (appropriate, progressing)      Plan   Continue with established Plan of Care towards established PT goals.     Alida Talbot, PT  9/11/2020

## 2020-09-15 ENCOUNTER — CLINICAL SUPPORT (OUTPATIENT)
Dept: REHABILITATION | Facility: OTHER | Age: 50
End: 2020-09-15
Attending: PHYSICIAN ASSISTANT
Payer: COMMERCIAL

## 2020-09-15 DIAGNOSIS — R29.898 DECREASED STRENGTH OF TRUNK AND BACK: ICD-10-CM

## 2020-09-15 DIAGNOSIS — M53.3 SI (SACROILIAC) JOINT DYSFUNCTION: ICD-10-CM

## 2020-09-15 DIAGNOSIS — M54.50 CHRONIC RIGHT-SIDED LOW BACK PAIN WITHOUT SCIATICA: Primary | ICD-10-CM

## 2020-09-15 DIAGNOSIS — G89.29 CHRONIC RIGHT-SIDED LOW BACK PAIN WITHOUT SCIATICA: Primary | ICD-10-CM

## 2020-09-15 PROCEDURE — 97110 THERAPEUTIC EXERCISES: CPT

## 2020-09-15 NOTE — PROGRESS NOTES
[facility]     Name: Jason Dutton  Clinic Number: [id number]    Therapy Diagnosis:   Encounter Diagnoses   Name Primary?    Chronic right-sided low back pain without sciatica Yes    SI (sacroiliac) joint dysfunction     Decreased strength of trunk and back      Physician: Bettie White PA-C    Visit Date: 9/15/2020    Physician Orders: PT Eval and Treat     Medical Diagnosis from Referral:     M47.816 (ICD-10-CM) - Lumbar spondylosis    M51.26 (ICD-10-CM) - Lumbar discogenic pain syndrome    Evaluation Date: 2020  Authorization Period Expiration: 2020  Plan of Care Expiration: 2020  Reassessment Due: 10/15/20    Visit # / Visits authorized: 10/20       Time In: 1600  Time Out: 1700  Total Billable Time: 60 minutes    Precautions: Standard and injections at the low back, RFA    Pattern of pain determined: 1    Subjective   Jason arrives to PT and reports 2/10 R sided LBP; worst with sitting/inactivity, and better with movement/walking and stretches. He reports the LTR and the prone ext help the most. The EIL have gotten easier he reports.      Patient reports tolerating previous visit well /c no sig soreness.      Patient reports their pain to be 2/10 currently; 5/10 at worst; 0/10 at best    Pain Location: low back R hip     Occupation: Engineering   Leisure: Walks, tennis  Pts goals: Be pain free, be able to pick stuff up.    Objective     Baseline IM Testing Results:   Date of testin2020  ROM 0-42 deg   Max Peak Torque 202    Min Peak Torque 93    Flex/Ext Ratio 2.17   % below normative data 24     Midpoint IM Testing Results:   Date of testin/15/20  ROM 0-45 deg   Max Peak Torque 236   Min Peak Torque 78   Flex/Ext Ratio 3.02:1   % below normative data 0     FOTO:      Treatment    Pt was instructed in and performed the following:     Jason received therapeutic exercises to develop/improved posture, cardiovascular endurance, muscular  endurance, lumbar/cervical ROM, strength and muscular endurance for 60 minutes including the following exercises:     [facility] 9/15/2020   Visit Number 10   VAS Pain Rating 2   Treadmill Time (in min.) 5   Speed 3.5   Incline 0   Lumbar Stretches - Slouch Overcorrection -   Extension in Lying 20   Lumbar Flexion 45   Lumbar Extension 0   Lumbar Peak Torque 236   Min Torque 78   Test Percent Below Normative Data 0   Test Percent Gain in Strength from Initial  0   Lumbar Weight 60   Repetitions 6   Rating of Perceived Exertion -   Ice - Z Lie (in min.) 5       LTR x10  Extension in lying 10x2  Piriformis stretch 4 x 20 s hold each side  Heel tap from BUE in table top x10 each side alternating   Bridges with knee ext  x15 each side alternating   Bridges with green TB 2x10  Clamshells x15 with green tb BLE, from supine to challenge CL LE to stabilize   Alternating prone superman 10x    Add Dead bug 2x 10 alternting      Seated slouch to correct x10  NP  Anti rotation green TB 20x NP  Flexion in lying 10x  NP    Peripheral muscle strengthening which included 1 set of 15-20 repetitions at a slow, controlled 10-13 second per rep pace focused on strengthening supporting musculature for improved body mechanics and functional mobility.  Pt and therapist focused on proper form during treatment to ensure optimal strengthening of each targeted muscle group.  Machines were utilized including torso rotation, leg extension, leg curl, chest press, upright row. Tricep extension, bicep curl, leg press, and hip abduction added visit 3    Home Exercises Provided and Patient Education Provided   Has lumbar roll in car  Education provided:   - cont prior HEP  -Pt ed on inc neuromotor control/signal efficiency /c muscle leading to inc strength vs long term physiologic muscle changes and potential for slowed down comfort /c MedX lumbar weight inc being expected     Written Home Exercises Provided: Patient instructed to cont prior  HEP.  Exercises were reviewed and Jason was able to demonstrate them prior to the end of the session.  Jason demonstrated good  understanding of the education provided.     See EMR under Patient Instructions for exercises provided prior visit.    Assessment     Patient has progressed with sitting tolerance for 20 min w/o increasing pain; able to complete HEP with minimal to no cuing from PT. Re-assessment on Medx revealed a 3 deg ROM flexion increase today, with progressing peak torque and dynamic weight. Patient is making good progress towards established goals at this time.    Pt will continue to benefit from skilled outpatient physical therapy to address the deficits stated in the impairment chart, provide pt/family education and to maximize pt's level of independence in the home and community environment.     Anticipated Barriers for therapy: none  Pt's spiritual, cultural and educational needs considered and pt agreeable to plan of care and goals as stated below:     Goals: Short term goals:  6 weeks or 10 visits   1.  Pt will demonstrate increased lumbar ROM by at least 3 degrees from the initial ROM value with improvements noted in functional ROM and ability to perform ADLs.   (MET 9/15/20)  2.  Pt will demonstrate increased MedX average isometric strength value  by 10% from initial test resulting in improved ability to perform bending, lifting, and carrying activities safely, confidently. (Progressing)   3.  Patient report a reduction in worst pain score by 1-2 points for improved tolerance for static standing.   (MET 9/15/20)  4.  Pt able to perform HEP correctly with minimal cueing or supervision from therapist to encourage independent management of symptoms.   (MET 9/15/20)      Long term goals: 10 weeks or 20 visits   1. Pt will demonstrate increased lumbar ROM by at least 6 degrees from initial ROM value, resulting in improved ability to perform functional fwd bending while standing and sitting.    (Progressing)  2. Pt will demonstrate increased MedX average isometric strength value  by 20% from initial test resulting in improved ability to perform bending, lifting, and carrying activities safely, confidently. (appropriate, progressing)  3. Pt to demonstrate ability to independently control and reduce their pain through posture positioning and mechanical movements throughout a typical day.   (appropriate, progressing)  4.  Pt will demonstrate reduced pain and improved functional outcomes as reported on the FOTO by reaching a limitation score of < or = 35% or less in order to demonstrate subjective improvement in pt's condition.  (appropriate, progressing)  5. Pt will demonstrate independence with the HEP at discharge (Progressing)  6.  Pt will be able to sit in one position for 30 - 60min without increased pain. (20 MINUTES AT BEST; Progressing)      Plan   Continue with established Plan of Care towards established PT goals.     Dorinda Lema, PT  9/15/2020

## 2020-09-18 ENCOUNTER — CLINICAL SUPPORT (OUTPATIENT)
Dept: REHABILITATION | Facility: OTHER | Age: 50
End: 2020-09-18
Attending: PHYSICIAN ASSISTANT
Payer: COMMERCIAL

## 2020-09-18 DIAGNOSIS — M54.50 CHRONIC RIGHT-SIDED LOW BACK PAIN WITHOUT SCIATICA: Primary | ICD-10-CM

## 2020-09-18 DIAGNOSIS — M53.3 SI (SACROILIAC) JOINT DYSFUNCTION: ICD-10-CM

## 2020-09-18 DIAGNOSIS — R29.898 DECREASED STRENGTH OF TRUNK AND BACK: ICD-10-CM

## 2020-09-18 DIAGNOSIS — G89.29 CHRONIC RIGHT-SIDED LOW BACK PAIN WITHOUT SCIATICA: Primary | ICD-10-CM

## 2020-09-18 PROCEDURE — 97110 THERAPEUTIC EXERCISES: CPT

## 2020-09-18 NOTE — PROGRESS NOTES
[facility]     Name: Jason Dutton  Clinic Number: [id number]    Therapy Diagnosis:   Encounter Diagnoses   Name Primary?    Chronic right-sided low back pain without sciatica Yes    SI (sacroiliac) joint dysfunction     Decreased strength of trunk and back      Physician: Bettie White PA-C    Visit Date: 2020    Physician Orders: PT Eval and Treat     Medical Diagnosis from Referral:     M47.816 (ICD-10-CM) - Lumbar spondylosis    M51.26 (ICD-10-CM) - Lumbar discogenic pain syndrome    Evaluation Date: 2020  Authorization Period Expiration: 2020  Plan of Care Expiration: 2020  Reassessment Due: 10/15/20    Visit # / Visits authorized: 10/20       Time In: 1600  Time Out: 1700  Total Billable Time: 60 minutes    Precautions: Standard and injections at the low back, RFA    Pattern of pain determined: 1    Subjective   Jason arrives to PT and reports 2/10 R sided LBP; worst with sitting/inactivity, and better with movement/walking and stretches. He reports the LTR and the prone ext help the most. The EIL have gotten easier he reports.      Patient reports tolerating previous visit well /c no sig soreness.      Patient reports their pain to be 2/10 currently; 5/10 at worst; 0/10 at best    Pain Location: low back R hip     Occupation: Engineering   Leisure: Walks, tennis  Pts goals: Be pain free, be able to pick stuff up.    Objective     Baseline IM Testing Results:   Date of testin2020  ROM 0-42 deg   Max Peak Torque 202    Min Peak Torque 93    Flex/Ext Ratio 2.17   % below normative data 24     Midpoint IM Testing Results:   Date of testin/15/20  ROM 0-45 deg   Max Peak Torque 236   Min Peak Torque 78   Flex/Ext Ratio 3.02:1   % below normative data 0     FOTO:      Treatment    Pt was instructed in and performed the following:     Jason received therapeutic exercises to develop/improved posture, cardiovascular endurance, muscular  endurance, lumbar/cervical ROM, strength and muscular endurance for 60 minutes including the following exercises:     [facility] 9/18/2020   Visit Number 11   VAS Pain Rating 1   Treadmill Time (in min.) 5   Speed -   Incline -   Lumbar Stretches - Slouch Overcorrection -   Extension in Lying 20   Extension in Standing -   Flexion in Lying -   Flexion in Sitting -   Lumbar Extension Seat Pad -   Femur Restraint -   Top Dead Center -   Counterweight -   Lumbar Flexion -   Lumbar Extension -   Lumbar Peak Torque -   Min Torque -   Test Percent Below Normative Data -   Test Percent Gain in Strength from Initial  -   Lumbar Weight 140   Repetitions 20   Rating of Perceived Exertion 3.5   Ice - Z Lie (in min.) 5         LTR x10  Extension in lying 10x2  Piriformis stretch 4 x 20 s hold each side  Anti rotation green TB 20x   Bridges with knee ext  x15 each side alternating   Bridges with green TB 2x10  Clamshells x15 with green tb BLE  Alternating prone superman 10x  Dead bug 2x 10 alternting    Heel tap from BUE in table top x10 each side alternating NP  Seated slouch to correct x10  NP    Flexion in lying 10x  NP    Peripheral muscle strengthening which included 1 set of 15-20 repetitions at a slow, controlled 10-13 second per rep pace focused on strengthening supporting musculature for improved body mechanics and functional mobility.  Pt and therapist focused on proper form during treatment to ensure optimal strengthening of each targeted muscle group.  Machines were utilized including torso rotation, leg extension, leg curl, chest press, upright row. Tricep extension, bicep curl, leg press, and hip abduction added visit 3    Home Exercises Provided and Patient Education Provided   Has lumbar roll in car  Education provided:   - cont prior HEP  -Pt ed on inc neuromotor control/signal efficiency /c muscle leading to inc strength vs long term physiologic muscle changes and potential for slowed down comfort /c  MedX lumbar weight inc being expected     Written Home Exercises Provided: Patient instructed to cont prior HEP.  Exercises were reviewed and Jason was able to demonstrate them prior to the end of the session.  Jason demonstrated good  understanding of the education provided.     See EMR under Patient Instructions for exercises provided prior visit.    Assessment     Patient was able to complete exercises today with minimal cues for exercise progression or execution, pt educated to continue with the extension hold on the lumbar med to increase strength in that extended range. Pt reports that he can tell he is getting better. Pt was able to increase resistance on the lumbar medx and plan to continue to progress as tolerated.     Patient is making good progress towards established goals at this time.  Pt will continue to benefit from skilled outpatient physical therapy to address the deficits stated in the impairment chart, provide pt/family education and to maximize pt's level of independence in the home and community environment.     Anticipated Barriers for therapy: none  Pt's spiritual, cultural and educational needs considered and pt agreeable to plan of care and goals as stated below:     Goals: Short term goals:  6 weeks or 10 visits   1.  Pt will demonstrate increased lumbar ROM by at least 3 degrees from the initial ROM value with improvements noted in functional ROM and ability to perform ADLs.   (MET 9/15/20)  2.  Pt will demonstrate increased MedX average isometric strength value  by 10% from initial test resulting in improved ability to perform bending, lifting, and carrying activities safely, confidently. (Progressing)   3.  Patient report a reduction in worst pain score by 1-2 points for improved tolerance for static standing.   (MET 9/15/20)  4.  Pt able to perform HEP correctly with minimal cueing or supervision from therapist to encourage independent management of symptoms.   (MET 9/15/20)      Long  term goals: 10 weeks or 20 visits   1. Pt will demonstrate increased lumbar ROM by at least 6 degrees from initial ROM value, resulting in improved ability to perform functional fwd bending while standing and sitting.   (Progressing)  2. Pt will demonstrate increased MedX average isometric strength value  by 20% from initial test resulting in improved ability to perform bending, lifting, and carrying activities safely, confidently. (appropriate, progressing)  3. Pt to demonstrate ability to independently control and reduce their pain through posture positioning and mechanical movements throughout a typical day.   (appropriate, progressing)  4.  Pt will demonstrate reduced pain and improved functional outcomes as reported on the FOTO by reaching a limitation score of < or = 35% or less in order to demonstrate subjective improvement in pt's condition.  (appropriate, progressing)  5. Pt will demonstrate independence with the HEP at discharge (Progressing)  6.  Pt will be able to sit in one position for 30 - 60min without increased pain. (20 MINUTES AT BEST; Progressing)      Plan   Continue with established Plan of Care towards established PT goals.     Alida Talbot, PT  9/18/2020

## 2020-09-21 ENCOUNTER — CLINICAL SUPPORT (OUTPATIENT)
Dept: REHABILITATION | Facility: OTHER | Age: 50
End: 2020-09-21
Attending: PHYSICIAN ASSISTANT
Payer: COMMERCIAL

## 2020-09-21 DIAGNOSIS — G89.29 CHRONIC RIGHT-SIDED LOW BACK PAIN WITHOUT SCIATICA: Primary | ICD-10-CM

## 2020-09-21 DIAGNOSIS — R29.898 DECREASED STRENGTH OF TRUNK AND BACK: ICD-10-CM

## 2020-09-21 DIAGNOSIS — M53.3 SI (SACROILIAC) JOINT DYSFUNCTION: ICD-10-CM

## 2020-09-21 DIAGNOSIS — M54.50 CHRONIC RIGHT-SIDED LOW BACK PAIN WITHOUT SCIATICA: Primary | ICD-10-CM

## 2020-09-21 PROCEDURE — 97110 THERAPEUTIC EXERCISES: CPT | Mod: CQ

## 2020-09-21 NOTE — PROGRESS NOTES
[facility]     Name: Jason Dutton  Clinic Number: [id number]    Therapy Diagnosis:   Encounter Diagnoses   Name Primary?    Chronic right-sided low back pain without sciatica Yes    SI (sacroiliac) joint dysfunction     Decreased strength of trunk and back      Physician: Bettie White PA-C    Visit Date: 2020    Physician Orders: PT Eval and Treat     Medical Diagnosis from Referral:     M47.816 (ICD-10-CM) - Lumbar spondylosis    M51.26 (ICD-10-CM) - Lumbar discogenic pain syndrome    Evaluation Date: 2020  Authorization Period Expiration: 2020  Plan of Care Expiration: 2020  Reassessment Due: 10/15/20    Visit # / Visits authorized:        Time In: 8:09  Time Out: 9:10  Total Billable Time: 51 minutes    Precautions: Standard and injections at the low back, RFA    Pattern of pain determined: 1    Subjective   Jason arrives to PT and reports 2/10 R sided LBP. He feels that the program has helped.    Patient reports tolerating previous visit well /c no sig soreness.      Patient reports their pain to be 2/10 currently; 5/10 at worst; 0/10 at best    Pain Location: low back R hip     Occupation: Engineering   Leisure: Walks, tennis  Pts goals: Be pain free, be able to pick stuff up.    Objective     Baseline IM Testing Results:   Date of testin2020  ROM 0-42 deg   Max Peak Torque 202    Min Peak Torque 93    Flex/Ext Ratio 2.17   % below normative data 24     Midpoint IM Testing Results:   Date of testin/15/20  ROM 0-45 deg   Max Peak Torque 236   Min Peak Torque 78   Flex/Ext Ratio 3.02:1   % below normative data 0     FOTO:      Treatment    Pt was instructed in and performed the following:     Jason received therapeutic exercises to develop/improved posture, cardiovascular endurance, muscular endurance, lumbar/cervical ROM, strength and muscular endurance for 61 minutes including the following exercises:           LTR x10  Extension  in lying 10x2  Piriformis stretch 4 x 20 s hold each side  Anti rotation green TB 20x   Bridges with knee ext  x15 each side alternating   Bridges with green TB 2x10  Clamshells x15 with green tb BLE  Alternating prone superman 15x  Dead bug 2x 10 alternating (NT)   Heel tap from BUE in table top x10 each side alternating NP  Seated slouch to correct x10  NP  Flexion in lying 10x  NP  [facility] 9/21/2020   Visit Number 12   VAS Pain Rating 2   Treadmill Time (in min.) 5   Speed -   Incline -   Lumbar Stretches - Slouch Overcorrection -   Extension in Lying -   Extension in Standing -   Flexion in Lying -   Flexion in Sitting -   Lumbar Extension Seat Pad -   Femur Restraint -   Top Dead Center -   Counterweight -   Lumbar Flexion -   Lumbar Extension -   Lumbar Peak Torque -   Min Torque -   Test Percent Below Normative Data -   Test Percent Gain in Strength from Initial  -   Lumbar Weight 145   Repetitions 20   Rating of Perceived Exertion 3   Ice - Z Lie (in min.) 5       Peripheral muscle strengthening which included 1 set of 15-20 repetitions at a slow, controlled 10-13 second per rep pace focused on strengthening supporting musculature for improved body mechanics and functional mobility.  Pt and therapist focused on proper form during treatment to ensure optimal strengthening of each targeted muscle group.  Machines were utilized including torso rotation, leg extension, leg curl, chest press, upright row. Tricep extension, bicep curl, leg press, and hip abduction added visit 3    Home Exercises Provided and Patient Education Provided   Has lumbar roll in car  Education provided:   - cont prior HEP  -Pt ed on inc neuromotor control/signal efficiency /c muscle leading to inc strength vs long term physiologic muscle changes and potential for slowed down comfort /c MedX lumbar weight inc being expected     Written Home Exercises Provided: Patient instructed to cont prior HEP.  Exercises were reviewed and  Jason was able to demonstrate them prior to the end of the session.  Jason demonstrated good  understanding of the education provided.     See EMR under Patient Instructions for exercises provided prior visit.    Assessment     Patient was able to complete HEP with no c/o LBP. Not all HEP done due to time.  Pt was able to increase resistance on the lumbar medx and plan to continue to progress as tolerated. Increase weigh 5% next session.    Patient is making good progress towards established goals at this time.  Pt will continue to benefit from skilled outpatient physical therapy to address the deficits stated in the impairment chart, provide pt/family education and to maximize pt's level of independence in the home and community environment.     Anticipated Barriers for therapy: none  Pt's spiritual, cultural and educational needs considered and pt agreeable to plan of care and goals as stated below:     Goals: Short term goals:  6 weeks or 10 visits   1.  Pt will demonstrate increased lumbar ROM by at least 3 degrees from the initial ROM value with improvements noted in functional ROM and ability to perform ADLs.   (MET 9/15/20)  2.  Pt will demonstrate increased MedX average isometric strength value  by 10% from initial test resulting in improved ability to perform bending, lifting, and carrying activities safely, confidently. (Progressing)   3.  Patient report a reduction in worst pain score by 1-2 points for improved tolerance for static standing.   (MET 9/15/20)  4.  Pt able to perform HEP correctly with minimal cueing or supervision from therapist to encourage independent management of symptoms.   (MET 9/15/20)      Long term goals: 10 weeks or 20 visits   1. Pt will demonstrate increased lumbar ROM by at least 6 degrees from initial ROM value, resulting in improved ability to perform functional fwd bending while standing and sitting.   (Progressing)  2. Pt will demonstrate increased MedX average isometric  strength value  by 20% from initial test resulting in improved ability to perform bending, lifting, and carrying activities safely, confidently. (appropriate, progressing)  3. Pt to demonstrate ability to independently control and reduce their pain through posture positioning and mechanical movements throughout a typical day.   (appropriate, progressing)  4.  Pt will demonstrate reduced pain and improved functional outcomes as reported on the FOTO by reaching a limitation score of < or = 35% or less in order to demonstrate subjective improvement in pt's condition.  (appropriate, progressing)  5. Pt will demonstrate independence with the HEP at discharge (Progressing)  6.  Pt will be able to sit in one position for 30 - 60min without increased pain. (20 MINUTES AT BEST; Progressing)      Plan   Continue with established Plan of Care towards established PT goals.     Cristina Muller, PTA  9/21/2020

## 2020-09-23 ENCOUNTER — CLINICAL SUPPORT (OUTPATIENT)
Dept: REHABILITATION | Facility: OTHER | Age: 50
End: 2020-09-23
Attending: PHYSICIAN ASSISTANT
Payer: COMMERCIAL

## 2020-09-23 DIAGNOSIS — M54.50 CHRONIC RIGHT-SIDED LOW BACK PAIN WITHOUT SCIATICA: Primary | ICD-10-CM

## 2020-09-23 DIAGNOSIS — R29.898 DECREASED STRENGTH OF TRUNK AND BACK: ICD-10-CM

## 2020-09-23 DIAGNOSIS — G89.29 CHRONIC RIGHT-SIDED LOW BACK PAIN WITHOUT SCIATICA: Primary | ICD-10-CM

## 2020-09-23 DIAGNOSIS — M53.3 SI (SACROILIAC) JOINT DYSFUNCTION: ICD-10-CM

## 2020-09-23 PROCEDURE — 97110 THERAPEUTIC EXERCISES: CPT | Mod: CQ

## 2020-09-23 NOTE — PROGRESS NOTES
[facility]     Name: Jason Dutton  Clinic Number: [id number]    Therapy Diagnosis:   Encounter Diagnoses   Name Primary?    Chronic right-sided low back pain without sciatica Yes    SI (sacroiliac) joint dysfunction     Decreased strength of trunk and back      Physician: Bettie White PA-C    Visit Date: 2020    Physician Orders: PT Eval and Treat     Medical Diagnosis from Referral:     M47.816 (ICD-10-CM) - Lumbar spondylosis    M51.26 (ICD-10-CM) - Lumbar discogenic pain syndrome    Evaluation Date: 2020  Authorization Period Expiration: 2020  Plan of Care Expiration: 2020  Reassessment Due: 10/15/20    Visit # / Visits authorized:        Time In: 8:09  Time Out: 9:10  Total Billable Time: 51 minutes    Precautions: Standard and injections at the low back, RFA    Pattern of pain determined: 1    Subjective   Jason arrives to PT and reports 2/10 R sided LBP. He feels that the program has helped.    Patient reports tolerating previous visit well /c no sig soreness.      Patient reports their pain to be 2/10 currently; 5/10 at worst; 0/10 at best    Pain Location: low back R hip     Occupation: Engineering   Leisure: Walks, tennis  Pts goals: Be pain free, be able to pick stuff up.    Objective     Baseline IM Testing Results:   Date of testin2020  ROM 0-42 deg   Max Peak Torque 202    Min Peak Torque 93    Flex/Ext Ratio 2.17   % below normative data 24     Midpoint IM Testing Results:   Date of testin/15/20  ROM 0-45 deg   Max Peak Torque 236   Min Peak Torque 78   Flex/Ext Ratio 3.02:1   % below normative data 0     FOTO:      Treatment    Pt was instructed in and performed the following:     Jason received therapeutic exercises to develop/improved posture, cardiovascular endurance, muscular endurance, lumbar/cervical ROM, strength and muscular endurance for 61 minutes including the following exercises:           LTR  x10  Extension in lying 10x2  Piriformis stretch 3 x 20 s hold each side  Anti rotation green TB 20x   Bridges with knee ext  x15 each side alternating   Bridges with green TB 2x10  Clamshells x20 with green tb BLE  Alternating prone superman 15x  Dead bug 2x 10 alternating (NT)  Heel tap from BUE in table top x10 each side alternating NP  Seated slouch to correct x10  NP  Flexion in lying 10x    [facility] 9/23/2020   Visit Number 13   VAS Pain Rating 2   Treadmill Time (in min.) 5   Lumbar Weight 149   Repetitions 18   Rating of Perceived Exertion 3   Ice - Z Lie (in min.) 5       Peripheral muscle strengthening which included 1 set of 15-20 repetitions at a slow, controlled 10-13 second per rep pace focused on strengthening supporting musculature for improved body mechanics and functional mobility.  Pt and therapist focused on proper form during treatment to ensure optimal strengthening of each targeted muscle group.  Machines were utilized including torso rotation, leg extension, leg curl, chest press, upright row. Tricep extension, bicep curl, leg press, and hip abduction added visit 3    Home Exercises Provided and Patient Education Provided   Has lumbar roll in car  Tennis ball against wall or mat  to trigger point spot  Education provided:   - cont prior HEP  -Pt ed on inc neuromotor control/signal efficiency /c muscle leading to inc strength vs long term physiologic muscle changes and potential for slowed down comfort /c MedX lumbar weight inc being expected     Written Home Exercises Provided: Patient instructed to cont prior HEP.  Exercises were reviewed and Jason was able to demonstrate them prior to the end of the session.  Jason demonstrated good  understanding of the education provided.     See EMR under Patient Instructions for exercises provided prior visit.    Assessment     Patient was able to complete HEP with no c/o LBP. Not all HEP done due to time. Added use of tennis ball against wall  or mat  to trigger point spot to reduce pain and tightness.   Pt was able to increase resistance on the lumbar medx and complete 18 reps with no c/o LBP.    Patient is making good progress towards established goals at this time.  Pt will continue to benefit from skilled outpatient physical therapy to address the deficits stated in the impairment chart, provide pt/family education and to maximize pt's level of independence in the home and community environment.     Anticipated Barriers for therapy: none  Pt's spiritual, cultural and educational needs considered and pt agreeable to plan of care and goals as stated below:     Goals: Short term goals:  6 weeks or 10 visits   1.  Pt will demonstrate increased lumbar ROM by at least 3 degrees from the initial ROM value with improvements noted in functional ROM and ability to perform ADLs.   (MET 9/15/20)  2.  Pt will demonstrate increased MedX average isometric strength value  by 10% from initial test resulting in improved ability to perform bending, lifting, and carrying activities safely, confidently. (Progressing)   3.  Patient report a reduction in worst pain score by 1-2 points for improved tolerance for static standing.   (MET 9/15/20)  4.  Pt able to perform HEP correctly with minimal cueing or supervision from therapist to encourage independent management of symptoms.   (MET 9/15/20)      Long term goals: 10 weeks or 20 visits   1. Pt will demonstrate increased lumbar ROM by at least 6 degrees from initial ROM value, resulting in improved ability to perform functional fwd bending while standing and sitting.   (Progressing)  2. Pt will demonstrate increased MedX average isometric strength value  by 20% from initial test resulting in improved ability to perform bending, lifting, and carrying activities safely, confidently. (appropriate, progressing)  3. Pt to demonstrate ability to independently control and reduce their pain through posture positioning and  mechanical movements throughout a typical day.   (appropriate, progressing)  4.  Pt will demonstrate reduced pain and improved functional outcomes as reported on the FOTO by reaching a limitation score of < or = 35% or less in order to demonstrate subjective improvement in pt's condition.  (appropriate, progressing)  5. Pt will demonstrate independence with the HEP at discharge (Progressing)  6.  Pt will be able to sit in one position for 30 - 60min without increased pain. (20 MINUTES AT BEST; Progressing)      Plan   Continue with established Plan of Care towards established PT goals.     Cristina Muller, PTA  9/23/2020

## 2020-09-28 ENCOUNTER — PATIENT MESSAGE (OUTPATIENT)
Dept: RESEARCH | Facility: OTHER | Age: 50
End: 2020-09-28

## 2020-10-02 ENCOUNTER — CLINICAL SUPPORT (OUTPATIENT)
Dept: REHABILITATION | Facility: OTHER | Age: 50
End: 2020-10-02
Attending: PHYSICIAN ASSISTANT
Payer: COMMERCIAL

## 2020-10-02 DIAGNOSIS — M54.50 CHRONIC RIGHT-SIDED LOW BACK PAIN WITHOUT SCIATICA: Primary | ICD-10-CM

## 2020-10-02 DIAGNOSIS — G89.29 CHRONIC RIGHT-SIDED LOW BACK PAIN WITHOUT SCIATICA: Primary | ICD-10-CM

## 2020-10-02 DIAGNOSIS — M53.3 SI (SACROILIAC) JOINT DYSFUNCTION: ICD-10-CM

## 2020-10-02 DIAGNOSIS — R29.898 DECREASED STRENGTH OF TRUNK AND BACK: ICD-10-CM

## 2020-10-02 PROCEDURE — 97110 THERAPEUTIC EXERCISES: CPT

## 2020-10-02 NOTE — PROGRESS NOTES
[facility]     Name: Jason Dutton  Clinic Number: [id number]    Therapy Diagnosis:   Encounter Diagnoses   Name Primary?    Chronic right-sided low back pain without sciatica Yes    SI (sacroiliac) joint dysfunction     Decreased strength of trunk and back      Physician: Bettie White PA-C    Visit Date: 10/2/2020    Physician Orders: PT Eval and Treat     Medical Diagnosis from Referral:     M47.816 (ICD-10-CM) - Lumbar spondylosis    M51.26 (ICD-10-CM) - Lumbar discogenic pain syndrome    Evaluation Date: 2020  Authorization Period Expiration: 2020  Plan of Care Expiration: 2020  Reassessment Due: 10/15/20    Visit # / Visits authorized:        Time In: 8:15  Time Out: 9:05  Total Billable Time: 55 minutes    Precautions: Standard and injections at the low back, RFA    Pattern of pain determined: 1    Subjective   Jason arrives with no back pain, just the slight tightness he usually feels.     Patient reports tolerating previous visit well /c no sig soreness.      Patient reports their pain to be 2/10 currently; 5/10 at worst; 0/10 at best    Pain Location: low back R hip     Occupation: Engineering   Leisure: Walks, tennis  Pts goals: Be pain free, be able to pick stuff up.    Objective     Baseline IM Testing Results:   Date of testin2020  ROM 0-42 deg   Max Peak Torque 202    Min Peak Torque 93    Flex/Ext Ratio 2.17   % below normative data 24     Midpoint IM Testing Results:   Date of testin/15/20  ROM 0-45 deg   Max Peak Torque 236   Min Peak Torque 78   Flex/Ext Ratio 3.02:1   % below normative data 0     FOTO:      Treatment    Pt was instructed in and performed the following:     Jason received therapeutic exercises to develop/improved posture, cardiovascular endurance, muscular endurance, lumbar/cervical ROM, strength and muscular endurance for 61 minutes including the following exercises:           LTR x10  Extension in lying  10x2  Piriformis stretch 3 x 20 s hold each side  Anti rotation green TB 20x   Bridges with knee ext  x15 each side alternating   Bridges with green TB 2x10  Clamshells x20 with green tb BLE  Alternating prone superman 15x  Dead bug 2x 10 alternating (NT)  Heel tap from BUE in table top x10 each side alternating NP  Seated slouch to correct x10  NP  Flexion in lying 10x      [facility] - Short 10/2/2020   Visit Number 14   VAS Pain Rating 1   Treadmill Time (in min.) 5   Speed -   Incline -   Lumbar Stretches - Slouch -   Extension in Lying 20   Extension in Standing -   Flexion in Lying -   Flexion in Sitting -   Lumbar Extension - Seat Pad -   Femur Restraint -   Top Dead Center -   Counterweight -   Lumbar Flexion -   Lumbar Extension -   Lumbar Peak Torque -   Lumbar Weight 149   Repetitions 20   Rating of Perceived Exertion 3         Peripheral muscle strengthening which included 1 set of 15-20 repetitions at a slow, controlled 10-13 second per rep pace focused on strengthening supporting musculature for improved body mechanics and functional mobility.  Pt and therapist focused on proper form during treatment to ensure optimal strengthening of each targeted muscle group.  Machines were utilized including torso rotation, leg extension, leg curl, chest press, upright row. Tricep extension, bicep curl, leg press, and hip abduction added visit 3    Home Exercises Provided and Patient Education Provided   Has lumbar roll in car  Tennis ball against wall or mat  to trigger point spot  Education provided:   - cont prior HEP  -Pt ed on inc neuromotor control/signal efficiency /c muscle leading to inc strength vs long term physiologic muscle changes and potential for slowed down comfort /c MedX lumbar weight inc being expected     Written Home Exercises Provided: Patient instructed to cont prior HEP.  Exercises were reviewed and Jason was able to demonstrate them prior to the end of the session.  Jason  demonstrated good  understanding of the education provided.     See EMR under Patient Instructions for exercises provided prior visit.    Assessment     Patient was able to complete HEP with no c/o LBP.  Pt was able to complete 20 reps at same weight on medx with 3/10 exertion. He reports no back pain during the week he did not come into therapy, and was able to complete his stretches daily.     Patient is making good progress towards established goals at this time.  Pt will continue to benefit from skilled outpatient physical therapy to address the deficits stated in the impairment chart, provide pt/family education and to maximize pt's level of independence in the home and community environment.     Anticipated Barriers for therapy: none  Pt's spiritual, cultural and educational needs considered and pt agreeable to plan of care and goals as stated below:     Goals: Short term goals:  6 weeks or 10 visits   1.  Pt will demonstrate increased lumbar ROM by at least 3 degrees from the initial ROM value with improvements noted in functional ROM and ability to perform ADLs.   (MET 9/15/20)  2.  Pt will demonstrate increased MedX average isometric strength value  by 10% from initial test resulting in improved ability to perform bending, lifting, and carrying activities safely, confidently. (Progressing)   3.  Patient report a reduction in worst pain score by 1-2 points for improved tolerance for static standing.   (MET 9/15/20)  4.  Pt able to perform HEP correctly with minimal cueing or supervision from therapist to encourage independent management of symptoms.   (MET 9/15/20)      Long term goals: 10 weeks or 20 visits   1. Pt will demonstrate increased lumbar ROM by at least 6 degrees from initial ROM value, resulting in improved ability to perform functional fwd bending while standing and sitting.   (Progressing)  2. Pt will demonstrate increased MedX average isometric strength value  by 20% from initial test  resulting in improved ability to perform bending, lifting, and carrying activities safely, confidently. (appropriate, progressing)  3. Pt to demonstrate ability to independently control and reduce their pain through posture positioning and mechanical movements throughout a typical day.   (appropriate, progressing)  4.  Pt will demonstrate reduced pain and improved functional outcomes as reported on the FOTO by reaching a limitation score of < or = 35% or less in order to demonstrate subjective improvement in pt's condition.  (appropriate, progressing)  5. Pt will demonstrate independence with the HEP at discharge (Progressing)  6.  Pt will be able to sit in one position for 30 - 60min without increased pain. (20 MINUTES AT BEST; Progressing)      Plan   Continue with established Plan of Care towards established PT goals.     Luciano Shah, PT  10/2/2020

## 2020-10-05 ENCOUNTER — PATIENT MESSAGE (OUTPATIENT)
Dept: ADMINISTRATIVE | Facility: HOSPITAL | Age: 50
End: 2020-10-05

## 2020-10-06 ENCOUNTER — CLINICAL SUPPORT (OUTPATIENT)
Dept: REHABILITATION | Facility: OTHER | Age: 50
End: 2020-10-06
Attending: PHYSICIAN ASSISTANT
Payer: COMMERCIAL

## 2020-10-06 DIAGNOSIS — M53.3 SI (SACROILIAC) JOINT DYSFUNCTION: ICD-10-CM

## 2020-10-06 DIAGNOSIS — M54.50 CHRONIC RIGHT-SIDED LOW BACK PAIN WITHOUT SCIATICA: Primary | ICD-10-CM

## 2020-10-06 DIAGNOSIS — G89.29 CHRONIC RIGHT-SIDED LOW BACK PAIN WITHOUT SCIATICA: Primary | ICD-10-CM

## 2020-10-06 DIAGNOSIS — R29.898 DECREASED STRENGTH OF TRUNK AND BACK: ICD-10-CM

## 2020-10-06 PROCEDURE — 97110 THERAPEUTIC EXERCISES: CPT | Mod: CQ

## 2020-10-06 NOTE — PROGRESS NOTES
[facility]     Name: Jason Dutton  Clinic Number: [id number]    Therapy Diagnosis:   Encounter Diagnoses   Name Primary?    Chronic right-sided low back pain without sciatica Yes    SI (sacroiliac) joint dysfunction     Decreased strength of trunk and back      Physician: Bettie White PA-C    Visit Date: 10/6/2020    Physician Orders: PT Eval and Treat     Medical Diagnosis from Referral:     M47.816 (ICD-10-CM) - Lumbar spondylosis    M51.26 (ICD-10-CM) - Lumbar discogenic pain syndrome    Evaluation Date: 2020  Authorization Period Expiration: 2020  Plan of Care Expiration: 2020  Reassessment Due: 10/15/20    Visit # / Visits authorized:        Time In: 8:37  Time Out: 9:30  Total Billable Time:  43 minutes    Precautions: Standard and injections at the low back, RFA    Pattern of pain determined: 1    Subjective   Jason arrives with no back pain, just the slight tightness he usually feels.     Patient reports tolerating previous visit well /c no sig soreness.      Patient reports their pain to be 2/10 currently; 5/10 at worst; 0/10 at best    Pain Location: low back R hip     Occupation: Engineering   Leisure: Walks, tennis  Pts goals: Be pain free, be able to pick stuff up.    Objective     Baseline IM Testing Results:   Date of testin2020  ROM 0-42 deg   Max Peak Torque 202    Min Peak Torque 93    Flex/Ext Ratio 2.17   % below normative data 24     Midpoint IM Testing Results:   Date of testin/15/20  ROM 0-45 deg   Max Peak Torque 236   Min Peak Torque 78   Flex/Ext Ratio 3.02:1   % below normative data 0     FOTO:      Treatment    Pt was instructed in and performed the following:     Jason received therapeutic exercises to develop/improved posture, cardiovascular endurance, muscular endurance, lumbar/cervical ROM, strength and muscular endurance for 53 minutes including the following exercises:           LTR x10  Extension in  lying 10x2  Piriformis stretch 3 x 20 s hold each side  Anti rotation green TB 20x   Bridges with knee ext  x15 each side alternating   Bridges with green TB 2x10  Clamshells x20 with green tb BLE   Heel tap from BUE in table top x10 each side alternating (NT)  Seated slouch to correct x10  (NT)  Flexion in lying 10x    Quadriped bird/dog 10x  Quadriped L lateral  Shift Quadratus lumborum  30secs 3x  [facility] 10/6/2020   Visit Number 15   VAS Pain Rating 1   Treadmill Time (in min.) 5   Lumbar Weight 154   Repetitions 18   Rating of Perceived Exertion 3   Ice - Z Lie (in min.) 5         Peripheral muscle strengthening which included 1 set of 15-20 repetitions at a slow, controlled 10-13 second per rep pace focused on strengthening supporting musculature for improved body mechanics and functional mobility.  Pt and therapist focused on proper form during treatment to ensure optimal strengthening of each targeted muscle group.  Machines were utilized including torso rotation, leg extension, leg curl, chest press, upright row. Tricep extension, bicep curl, leg press, and hip abduction added visit 3    Home Exercises Provided and Patient Education Provided   Has lumbar roll in car  Tennis ball against wall or mat  to trigger point spot  Education provided:   - cont prior HEP  -Pt ed on inc neuromotor control/signal efficiency /c muscle leading to inc strength vs long term physiologic muscle changes and potential for slowed down comfort /c MedX lumbar weight inc being expected     Written Home Exercises Provided: Patient instructed to cont prior HEP.  Exercises were reviewed and Jason was able to demonstrate them prior to the end of the session.  Jason demonstrated good  understanding of the education provided.     See EMR under Patient Instructions for exercises provided prior visit.    Assessment     Patient was able to complete HEP with no c/o LBP. Added bird/dog for more core stability therex. Also added  quadriped L lateral  Quadratus lumborum stretch that helped a little.  Pt was able to complete 18 reps with a  weight ^ on medx with 3/10 exertion. He is continuing to do his HEP daily.    Patient is making good progress towards established goals at this time.  Pt will continue to benefit from skilled outpatient physical therapy to address the deficits stated in the impairment chart, provide pt/family education and to maximize pt's level of independence in the home and community environment.     Anticipated Barriers for therapy: none  Pt's spiritual, cultural and educational needs considered and pt agreeable to plan of care and goals as stated below:     Goals: Short term goals:  6 weeks or 10 visits   1.  Pt will demonstrate increased lumbar ROM by at least 3 degrees from the initial ROM value with improvements noted in functional ROM and ability to perform ADLs.   (MET 9/15/20)  2.  Pt will demonstrate increased MedX average isometric strength value  by 10% from initial test resulting in improved ability to perform bending, lifting, and carrying activities safely, confidently. (Progressing)   3.  Patient report a reduction in worst pain score by 1-2 points for improved tolerance for static standing.   (MET 9/15/20)  4.  Pt able to perform HEP correctly with minimal cueing or supervision from therapist to encourage independent management of symptoms.   (MET 9/15/20)      Long term goals: 10 weeks or 20 visits   1. Pt will demonstrate increased lumbar ROM by at least 6 degrees from initial ROM value, resulting in improved ability to perform functional fwd bending while standing and sitting.   (Progressing)  2. Pt will demonstrate increased MedX average isometric strength value  by 20% from initial test resulting in improved ability to perform bending, lifting, and carrying activities safely, confidently. (appropriate, progressing)  3. Pt to demonstrate ability to independently control and reduce their pain through  posture positioning and mechanical movements throughout a typical day.   (appropriate, progressing)  4.  Pt will demonstrate reduced pain and improved functional outcomes as reported on the FOTO by reaching a limitation score of < or = 35% or less in order to demonstrate subjective improvement in pt's condition.  (appropriate, progressing)  5. Pt will demonstrate independence with the HEP at discharge (Progressing)  6.  Pt will be able to sit in one position for 30 - 60min without increased pain. (20 MINUTES AT BEST; Progressing)      Plan   Continue with established Plan of Care towards established PT goals.     Cristina Muller, PTA  10/6/2020

## 2020-10-09 ENCOUNTER — CLINICAL SUPPORT (OUTPATIENT)
Dept: REHABILITATION | Facility: OTHER | Age: 50
End: 2020-10-09
Attending: PHYSICIAN ASSISTANT
Payer: COMMERCIAL

## 2020-10-09 DIAGNOSIS — G89.29 CHRONIC RIGHT-SIDED LOW BACK PAIN WITHOUT SCIATICA: Primary | ICD-10-CM

## 2020-10-09 DIAGNOSIS — M53.3 SI (SACROILIAC) JOINT DYSFUNCTION: ICD-10-CM

## 2020-10-09 DIAGNOSIS — R29.898 DECREASED STRENGTH OF TRUNK AND BACK: ICD-10-CM

## 2020-10-09 DIAGNOSIS — M54.50 CHRONIC RIGHT-SIDED LOW BACK PAIN WITHOUT SCIATICA: Primary | ICD-10-CM

## 2020-10-09 PROCEDURE — 97110 THERAPEUTIC EXERCISES: CPT | Mod: CQ

## 2020-10-09 NOTE — PROGRESS NOTES
[facility]     Name: Jason Dutton  Clinic Number: [id number]    Therapy Diagnosis:   Encounter Diagnoses   Name Primary?    Chronic right-sided low back pain without sciatica Yes    SI (sacroiliac) joint dysfunction     Decreased strength of trunk and back      Physician: Bettie White PA-C    Visit Date: 10/9/2020    Physician Orders: PT Eval and Treat     Medical Diagnosis from Referral:     M47.816 (ICD-10-CM) - Lumbar spondylosis    M51.26 (ICD-10-CM) - Lumbar discogenic pain syndrome    Evaluation Date: 2020  Authorization Period Expiration: 2020  Plan of Care Expiration: 2020  Reassessment Due: 10/15/20    Visit # / Visits authorized:        Time In: 8:37  Time Out: 9:35  Total Billable Time: 47  minutes    Precautions: Standard and injections at the low back, RFA    Pattern of pain determined: 1    Subjective   Jason arrives with no back pain, just the slight tightness he usually feels.     Patient reports tolerating previous visit well /c no sig soreness.      Patient reports their pain to be 2/10 currently; 5/10 at worst; 0/10 at best    Pain Location: low back R hip     Occupation: Engineering   Leisure: Walks, tennis  Pts goals: Be pain free, be able to pick stuff up.    Objective     Baseline IM Testing Results:   Date of testin2020  ROM 0-42 deg   Max Peak Torque 202    Min Peak Torque 93    Flex/Ext Ratio 2.17   % below normative data 24     Midpoint IM Testing Results:   Date of testin/15/20  ROM 0-45 deg   Max Peak Torque 236   Min Peak Torque 78   Flex/Ext Ratio 3.02:1   % below normative data 0     FOTO:      Treatment    Pt was instructed in and performed the following:     Jason received therapeutic exercises to develop/improved posture, cardiovascular endurance, muscular endurance, lumbar/cervical ROM, strength and muscular endurance for 57 minutes including the following exercises:           LTR x10  Extension in  lying 10x2  Piriformis stretch 3 x 20 s hold each side  Anti rotation green TB 20x   Bridges with knee ext  x15 each side alternating   Bridges with green TB 2x10  Clamshells x20 with green tb BLE   Heel tap from BUE in table top x10 each side alternating (NT)  Seated slouch to correct x10  (NT)   Flexion in lying 10x    Quadriped bird/dog 15x  Wall or Quadriped L lateral  Shift Quadratus lumborum  30secs 3x  Open Book 10x  [facility] 10/6/2020   Visit Number 15   VAS Pain Rating 1   Treadmill Time (in min.) 5   Lumbar Weight 154   Repetitions 18   Rating of Perceived Exertion 3   Ice - Z Lie (in min.) 5         Peripheral muscle strengthening which included 1 set of 15-20 repetitions at a slow, controlled 10-13 second per rep pace focused on strengthening supporting musculature for improved body mechanics and functional mobility.  Pt and therapist focused on proper form during treatment to ensure optimal strengthening of each targeted muscle group.  Machines were utilized including torso rotation, leg extension, leg curl, chest press, upright row. Tricep extension, bicep curl, leg press, and hip abduction added visit 3    Home Exercises Provided and Patient Education Provided   Has lumbar roll in car  Tennis ball against wall or mat  to trigger point spot  Education provided:   - cont prior HEP  -Pt ed on inc neuromotor control/signal efficiency /c muscle leading to inc strength vs long term physiologic muscle changes and potential for slowed down comfort /c MedX lumbar weight inc being expected     Written Home Exercises Provided: Patient instructed to cont prior HEP.  Exercises were reviewed and Jason was able to demonstrate them prior to the end of the session.  Jason demonstrated good  understanding of the education provided.     See EMR under Patient Instructions for exercises provided prior visit.    Assessment     Patient was able to complete HEP with no c/o LBP. Added bird/dog for more core stability  therex. Also added quadriped L lateral  Quadratus lumborum stretch that helped a little.  Pt was able to complete 20 reps with the same weight on medx with 3/10 exertion. He is continuing to do his HEP daily.    Patient is making good progress towards established goals at this time.  Pt will continue to benefit from skilled outpatient physical therapy to address the deficits stated in the impairment chart, provide pt/family education and to maximize pt's level of independence in the home and community environment.     Anticipated Barriers for therapy: none  Pt's spiritual, cultural and educational needs considered and pt agreeable to plan of care and goals as stated below:     Goals: Short term goals:  6 weeks or 10 visits   1.  Pt will demonstrate increased lumbar ROM by at least 3 degrees from the initial ROM value with improvements noted in functional ROM and ability to perform ADLs.   (MET 9/15/20)  2.  Pt will demonstrate increased MedX average isometric strength value  by 10% from initial test resulting in improved ability to perform bending, lifting, and carrying activities safely, confidently. (Progressing)   3.  Patient report a reduction in worst pain score by 1-2 points for improved tolerance for static standing.   (MET 9/15/20)  4.  Pt able to perform HEP correctly with minimal cueing or supervision from therapist to encourage independent management of symptoms.   (MET 9/15/20)      Long term goals: 10 weeks or 20 visits   1. Pt will demonstrate increased lumbar ROM by at least 6 degrees from initial ROM value, resulting in improved ability to perform functional fwd bending while standing and sitting.   (Progressing)  2. Pt will demonstrate increased MedX average isometric strength value  by 20% from initial test resulting in improved ability to perform bending, lifting, and carrying activities safely, confidently. (appropriate, progressing)  3. Pt to demonstrate ability to independently control and  reduce their pain through posture positioning and mechanical movements throughout a typical day.   (appropriate, progressing)  4.  Pt will demonstrate reduced pain and improved functional outcomes as reported on the FOTO by reaching a limitation score of < or = 35% or less in order to demonstrate subjective improvement in pt's condition.  (appropriate, progressing)  5. Pt will demonstrate independence with the HEP at discharge (Progressing)  6.  Pt will be able to sit in one position for 30 - 60min without increased pain. (20 MINUTES AT BEST; Progressing)      Plan   Continue with established Plan of Care towards established PT goals.     Cristina Muller, PTA  10/9/2020

## 2020-10-23 ENCOUNTER — CLINICAL SUPPORT (OUTPATIENT)
Dept: REHABILITATION | Facility: OTHER | Age: 50
End: 2020-10-23
Attending: PHYSICIAN ASSISTANT
Payer: COMMERCIAL

## 2020-10-23 DIAGNOSIS — R29.898 DECREASED STRENGTH OF TRUNK AND BACK: ICD-10-CM

## 2020-10-23 DIAGNOSIS — M53.3 SI (SACROILIAC) JOINT DYSFUNCTION: ICD-10-CM

## 2020-10-23 DIAGNOSIS — G89.29 CHRONIC RIGHT-SIDED LOW BACK PAIN WITHOUT SCIATICA: Primary | ICD-10-CM

## 2020-10-23 DIAGNOSIS — M54.50 CHRONIC RIGHT-SIDED LOW BACK PAIN WITHOUT SCIATICA: Primary | ICD-10-CM

## 2020-10-23 PROCEDURE — 97110 THERAPEUTIC EXERCISES: CPT

## 2020-10-23 NOTE — PROGRESS NOTES
[facility]      Name: Jason Dutton  Clinic Number: [id number]    Therapy Diagnosis:   Encounter Diagnoses   Name Primary?    Chronic right-sided low back pain without sciatica Yes    SI (sacroiliac) joint dysfunction     Decreased strength of trunk and back      Physician: Bettie White PA-C    Visit Date: 10/23/2020    Physician Orders: PT Eval and Treat     Medical Diagnosis from Referral:     M47.816 (ICD-10-CM) - Lumbar spondylosis    M51.26 (ICD-10-CM) - Lumbar discogenic pain syndrome    Evaluation Date: 2020  Authorization Period Expiration: 2020  Plan of Care Expiration: 2020  Reassessment Due: 10/15/20    Visit # / Visits authorized: 15/20       Time In: 8:30  Time Out: 9:30  Total Billable Time: 50  minutes    Precautions: Standard and injections at the low back, RFA    Pattern of pain determined: 1    Subjective   Jason arrives with no back pain.  Sitting is still an issue long term.    Patient reports tolerating previous visit well /c no sig soreness.      Patient reports their pain to be 2/10 currently; 5/10 at worst; 0/10 at best    Pain Location: low back R hip     Occupation: Engineering   Leisure: Walks, tennis  Pts goals: Be pain free, be able to pick stuff up.    Objective     Baseline IM Testing Results:   Date of testin2020  ROM 0-42 deg   Max Peak Torque 202    Min Peak Torque 93    Flex/Ext Ratio 2.17   % below normative data 24     Midpoint IM Testing Results:   Date of testin/15/20  ROM 0-45 deg   Max Peak Torque 236   Min Peak Torque 78   Flex/Ext Ratio 3.02:1   % below normative data 0     FOTO:      Treatment    Pt was instructed in and performed the following:     Jason received therapeutic exercises to develop/improved posture, cardiovascular endurance, muscular endurance, lumbar/cervical ROM, strength and muscular endurance for 60 minutes including the following exercises:     [facility] 10/23/2020   Visit Number 16   VAS  Pain Rating 1   Treadmill Time (in min.) 5   Speed -   Incline -   Lumbar Stretches - Slouch Overcorrection -   Extension in Lying 20   Extension in Standing -   Flexion in Lying -   Flexion in Sitting -   Manual Therapy 5   Lumbar Extension Seat Pad -   Femur Restraint -   Top Dead Center -   Counterweight -   Lumbar Flexion -   Lumbar Extension -   Lumbar Peak Torque -   Min Torque -   Test Percent Below Normative Data -   Test Percent Gain in Strength from Initial  -   Lumbar Weight 154   Repetitions 20   Rating of Perceived Exertion 5   Ice - Z Lie (in min.) 5           LTR x10  Extension in lying 10x2  Piriformis stretch 3 x 20 s hold each side  Anti rotation green TB 20x   Bridges with knee ext  x15 each side alternating   Bridges with blue TB 2x10  Clamshells x20 with blue tb BLE    Flexion in lying 10x    Quadriped bird/dog 15x  Wall or Quadriped L lateral  Shift Quadratus lumborum  30secs 3x  Open Book 10x    Manual: cupping to R PSIS x 5 minutes , Re-assess effectiveness next visit    Peripheral muscle strengthening which included 1 set of 15-20 repetitions at a slow, controlled 10-13 second per rep pace focused on strengthening supporting musculature for improved body mechanics and functional mobility.  Pt and therapist focused on proper form during treatment to ensure optimal strengthening of each targeted muscle group.  Machines were utilized including torso rotation, leg extension, leg curl, chest press, upright row. Tricep extension, bicep curl, leg press, and hip abduction added visit 3    Home Exercises Provided and Patient Education Provided   Has lumbar roll in car  Tennis ball against wall or mat  to trigger point spot  Education provided:   - cont prior HEP  -Pt ed on inc neuromotor control/signal efficiency /c muscle leading to inc strength vs long term physiologic muscle changes and potential for slowed down comfort /c MedX lumbar weight inc being expected   Pt educated in benefits of cupping  for pain and inflammation.  Issued BTB to increase resistance for exercises  Written Home Exercises Provided: Patient instructed to cont prior HEP.  Exercises were reviewed and Jason was able to demonstrate them prior to the end of the session.  Jason demonstrated good  understanding of the education provided.     See EMR under Patient Instructions for exercises provided prior visit.    Assessment     Patient reports upon arrival he is having some discomfort on the R side, at the PSIS.  Attempted cupping and will re-assess effectiveness next visit.  Discussed Wellness program upon 20th visit and DC.  Pt interested in continuing to strengthen.  Pt completed 20 reps at 154ft/lbs with 5/10 exertion level.  Inc 5%  Next visit.  Encouraged pt to continue to stand every hour to stretch.  Patient is making good progress towards established goals at this time.  Pt will continue to benefit from skilled outpatient physical therapy to address the deficits stated in the impairment chart, provide pt/family education and to maximize pt's level of independence in the home and community environment.     Anticipated Barriers for therapy: none  Pt's spiritual, cultural and educational needs considered and pt agreeable to plan of care and goals as stated below:     Goals: Short term goals:  6 weeks or 10 visits   1.  Pt will demonstrate increased lumbar ROM by at least 3 degrees from the initial ROM value with improvements noted in functional ROM and ability to perform ADLs.   (MET 9/15/20)  2.  Pt will demonstrate increased MedX average isometric strength value  by 10% from initial test resulting in improved ability to perform bending, lifting, and carrying activities safely, confidently. (Progressing)   3.  Patient report a reduction in worst pain score by 1-2 points for improved tolerance for static standing.   (MET 9/15/20)  4.  Pt able to perform HEP correctly with minimal cueing or supervision from therapist to encourage  independent management of symptoms.   (MET 9/15/20)      Long term goals: 10 weeks or 20 visits   1. Pt will demonstrate increased lumbar ROM by at least 6 degrees from initial ROM value, resulting in improved ability to perform functional fwd bending while standing and sitting.   (Progressing)  2. Pt will demonstrate increased MedX average isometric strength value  by 20% from initial test resulting in improved ability to perform bending, lifting, and carrying activities safely, confidently. (appropriate, progressing)  3. Pt to demonstrate ability to independently control and reduce their pain through posture positioning and mechanical movements throughout a typical day.   (appropriate, progressing)  4.  Pt will demonstrate reduced pain and improved functional outcomes as reported on the FOTO by reaching a limitation score of < or = 35% or less in order to demonstrate subjective improvement in pt's condition.  (appropriate, progressing)  5. Pt will demonstrate independence with the HEP at discharge (Progressing)  6.  Pt will be able to sit in one position for 30 - 60min without increased pain. (20 MINUTES AT BEST; Progressing)      Plan   Continue with established Plan of Care towards established PT goals.     Mana Evans, PT  10/23/2020

## 2020-11-06 ENCOUNTER — CLINICAL SUPPORT (OUTPATIENT)
Dept: REHABILITATION | Facility: OTHER | Age: 50
End: 2020-11-06
Attending: PHYSICIAN ASSISTANT
Payer: COMMERCIAL

## 2020-11-06 DIAGNOSIS — R29.898 DECREASED STRENGTH OF TRUNK AND BACK: ICD-10-CM

## 2020-11-06 DIAGNOSIS — G89.29 CHRONIC RIGHT-SIDED LOW BACK PAIN WITHOUT SCIATICA: Primary | ICD-10-CM

## 2020-11-06 DIAGNOSIS — M54.50 CHRONIC RIGHT-SIDED LOW BACK PAIN WITHOUT SCIATICA: Primary | ICD-10-CM

## 2020-11-06 DIAGNOSIS — M53.3 SI (SACROILIAC) JOINT DYSFUNCTION: ICD-10-CM

## 2020-11-06 PROCEDURE — 97110 THERAPEUTIC EXERCISES: CPT

## 2020-11-06 NOTE — PROGRESS NOTES
[facility]      Name: Jason Dutton  Clinic Number: [id number]    Therapy Diagnosis:   Encounter Diagnoses   Name Primary?    Chronic right-sided low back pain without sciatica Yes    SI (sacroiliac) joint dysfunction     Decreased strength of trunk and back      Physician: Bettie White PA-C    Visit Date: 2020    Physician Orders: PT Eval and Treat     Medical Diagnosis from Referral:     M47.816 (ICD-10-CM) - Lumbar spondylosis    M51.26 (ICD-10-CM) - Lumbar discogenic pain syndrome    Evaluation Date: 2020  Authorization Period Expiration: 2020  Plan of Care Expiration: 2020  Reassessment Due: 10/15/20    Visit # / Visits authorized:        Time In: 8:55 (pt arrived 10 mins late)  Time Out: 9:45  Total Billable Time: 50  minutes    Precautions: Standard and injections at the low back, RFA    Pattern of pain determined: 1    Subjective   Jason arrives with low level back pain reporting some relief after last visit after trying cupping.    Patient reports tolerating previous visit well /c no sig soreness.      Patient reports their pain to be 2/10 currently; 5/10 at worst; 0/10 at best    Pain Location: low back R hip     Occupation: Engineering   Leisure: Walks, tennis  Pts goals: Be pain free, be able to pick stuff up.    Objective     Baseline IM Testing Results:   Date of testin2020  ROM 0-42 deg   Max Peak Torque 202    Min Peak Torque 93    Flex/Ext Ratio 2.17   % below normative data 24     Midpoint IM Testing Results:   Date of testin/15/20  ROM 0-45 deg   Max Peak Torque 236   Min Peak Torque 78   Flex/Ext Ratio 3.02:1   % below normative data 0     FOTO:      Treatment    Pt was instructed in and performed the following:     Jason received therapeutic exercises to develop/improved posture, cardiovascular endurance, muscular endurance, lumbar/cervical ROM, strength and muscular endurance for 50 minutes including the following exercises:        LTR x10  Extension in lying 10x2  Piriformis stretch 3 x 20 s hold each side  Anti rotation green TB 20x   Bridges with knee ext  x15 each side alternating   Bridges with blue TB 2x10  Clamshells x20 with blue tb BLE    Flexion in lying 10x    Quadriped bird/dog 15x  Wall or Quadriped L lateral  Shift Quadratus lumborum  30secs 3x  Open Book 10x    Manual: cupping to R PSIS x 5 minutes , Re-assess effectiveness next visit    [facility] 11/6/2020   Visit Number 17   VAS Pain Rating 2   Treadmill Time (in min.) 5   Speed -   Incline -   Lumbar Stretches - Slouch -   Extension in Lying 10   Extension in Standing -   Flexion in Lying -   Flexion in Sitting -   Manual Therapy 5   Lumbar Extension - Seat Pad -   Femur Restraint -   Top Dead Center -   Counterweight -   Lumbar Flexion -   Lumbar Extension -   Lumbar Peak Torque -   Lumbar Weight 160   Repetitions 15   Rating of Perceived Exertion 4         Peripheral muscle strengthening which included 1 set of 15-20 repetitions at a slow, controlled 10-13 second per rep pace focused on strengthening supporting musculature for improved body mechanics and functional mobility.  Pt and therapist focused on proper form during treatment to ensure optimal strengthening of each targeted muscle group.  Machines were utilized including torso rotation, leg extension, leg curl, chest press, upright row. Tricep extension, bicep curl, leg press, and hip abduction added visit 3    Home Exercises Provided and Patient Education Provided   Has lumbar roll in car  Tennis ball against wall or mat  to trigger point spot  Education provided:   - cont prior HEP  -Pt ed on inc neuromotor control/signal efficiency /c muscle leading to inc strength vs long term physiologic muscle changes and potential for slowed down comfort /c MedX lumbar weight inc being expected   Pt educated in benefits of cupping for pain and inflammation.  Issued BTB to increase resistance for  exercises  Written Home Exercises Provided: Patient instructed to cont prior HEP.  Exercises were reviewed and Jason was able to demonstrate them prior to the end of the session.  Jason demonstrated good  understanding of the education provided.     See EMR under Patient Instructions for exercises provided prior visit.    Assessment     Patient reports upon arrival he is having continued discomfort on the R side, in the area of the PSIS. Reported relief with cupping last visit so treatment was continued today with similar effects. Decreased tightness and soreness in R sided low back post manual therapy and therex.  Pt continues to be interesting in wellness program.  Pt completed 15 reps at 160 ft/lbs with 4/10 exertion level. Maintain this resistance next visit. Continue to progress as tolerated.    Patient is making good progress towards established goals at this time.  Pt will continue to benefit from skilled outpatient physical therapy to address the deficits stated in the impairment chart, provide pt/family education and to maximize pt's level of independence in the home and community environment.     Anticipated Barriers for therapy: none  Pt's spiritual, cultural and educational needs considered and pt agreeable to plan of care and goals as stated below:     Goals: Short term goals:  6 weeks or 10 visits   1.  Pt will demonstrate increased lumbar ROM by at least 3 degrees from the initial ROM value with improvements noted in functional ROM and ability to perform ADLs.   (MET 9/15/20)  2.  Pt will demonstrate increased MedX average isometric strength value  by 10% from initial test resulting in improved ability to perform bending, lifting, and carrying activities safely, confidently. (Progressing)   3.  Patient report a reduction in worst pain score by 1-2 points for improved tolerance for static standing.   (MET 9/15/20)  4.  Pt able to perform HEP correctly with minimal cueing or supervision from therapist to  encourage independent management of symptoms.   (MET 9/15/20)      Long term goals: 10 weeks or 20 visits   1. Pt will demonstrate increased lumbar ROM by at least 6 degrees from initial ROM value, resulting in improved ability to perform functional fwd bending while standing and sitting.   (Progressing)  2. Pt will demonstrate increased MedX average isometric strength value  by 20% from initial test resulting in improved ability to perform bending, lifting, and carrying activities safely, confidently. (appropriate, progressing)  3. Pt to demonstrate ability to independently control and reduce their pain through posture positioning and mechanical movements throughout a typical day.   (appropriate, progressing)  4.  Pt will demonstrate reduced pain and improved functional outcomes as reported on the FOTO by reaching a limitation score of < or = 35% or less in order to demonstrate subjective improvement in pt's condition.  (appropriate, progressing)  5. Pt will demonstrate independence with the HEP at discharge (Progressing)  6.  Pt will be able to sit in one position for 30 - 60min without increased pain. (20 MINUTES AT BEST; Progressing)      Plan   Continue with established Plan of Care towards established PT goals.     Vipin Prajapati, PT  11/6/2020

## 2020-11-10 ENCOUNTER — CLINICAL SUPPORT (OUTPATIENT)
Dept: REHABILITATION | Facility: OTHER | Age: 50
End: 2020-11-10
Attending: PHYSICIAN ASSISTANT
Payer: COMMERCIAL

## 2020-11-10 DIAGNOSIS — M53.3 SI (SACROILIAC) JOINT DYSFUNCTION: ICD-10-CM

## 2020-11-10 DIAGNOSIS — M54.50 CHRONIC RIGHT-SIDED LOW BACK PAIN WITHOUT SCIATICA: Primary | ICD-10-CM

## 2020-11-10 DIAGNOSIS — R29.898 DECREASED STRENGTH OF TRUNK AND BACK: ICD-10-CM

## 2020-11-10 DIAGNOSIS — G89.29 CHRONIC RIGHT-SIDED LOW BACK PAIN WITHOUT SCIATICA: Primary | ICD-10-CM

## 2020-11-10 PROCEDURE — 97110 THERAPEUTIC EXERCISES: CPT

## 2020-11-10 NOTE — PROGRESS NOTES
[facility]      Name: Jason Dutton  Clinic Number: [id number]    Therapy Diagnosis:   Encounter Diagnoses   Name Primary?    Chronic right-sided low back pain without sciatica Yes    SI (sacroiliac) joint dysfunction     Decreased strength of trunk and back      Physician: Bettie White PA-C    Visit Date: 11/10/2020    Physician Orders: PT Eval and Treat     Medical Diagnosis from Referral:     M47.816 (ICD-10-CM) - Lumbar spondylosis    M51.26 (ICD-10-CM) - Lumbar discogenic pain syndrome    Evaluation Date: 2020  Authorization Period Expiration: 2020  Plan of Care Expiration: 2020  Reassessment Due: 10/15/20    Visit # / Visits authorized:        Time In: 0840 (9 minutes late again)  Time Out: 09  Total Billable Time: 50  Minutes (3 TE)    Precautions: Standard and injections at the low back, RFA    Pattern of pain determined: 1    Subjective   Jason has no new c/o today, with a 2/10 LBP at this moment.     Patient reports tolerating previous visit well /c no sig soreness.      Patient reports their pain to be 2/10 currently; 5/10 at worst; 0/10 at best    Pain Location: low back R hip     Occupation: Engineering   Leisure: Walks, tennis  Pts goals: Be pain free, be able to pick stuff up.    Objective     Baseline IM Testing Results:   Date of testin2020  ROM 0-42 deg   Max Peak Torque 202    Min Peak Torque 93    Flex/Ext Ratio 2.17   % below normative data 24     Midpoint IM Testing Results:   Date of testin/15/20  ROM 0-45 deg   Max Peak Torque 236   Min Peak Torque 78   Flex/Ext Ratio 3.02:1   % below normative data 0     FOTO:      Treatment    Pt was instructed in and performed the following:     Jason received therapeutic exercises to develop/improved posture, cardiovascular endurance, muscular endurance, lumbar/cervical ROM, strength and muscular endurance for 45  minutes including the following exercises:     [facility] 11/10/2020   Visit  Number 18   VAS Pain Rating 2   Treadmill Time (in min.) 5   Extension in Lying 10   Lumbar Weight 160   Repetitions 18   Rating of Perceived Exertion 4   Ice - Z Lie (in min.) 5       LTR x10  Extension in lying 10x2  Piriformis stretch 3 x 20 s hold each side  Anti rotation green TB 20x   Bridges with knee ext  x15 each side alternating   Bridges with blue TB 2x10  Clamshells x20 with blue tb BLE    Flexion in lying 10x    Quadriped bird/dog 15x  Wall or Quadriped L lateral  Shift Quadratus lumborum  30secs 3x  Open Book 10x    Manual: cupping to R PSIS x 00 minutes , Re-assess effectiveness next visit-NT      Peripheral muscle strengthening which included 1 set of 15-20 repetitions at a slow, controlled 10-13 second per rep pace focused on strengthening supporting musculature for improved body mechanics and functional mobility.  Pt and therapist focused on proper form during treatment to ensure optimal strengthening of each targeted muscle group.  Machines were utilized including torso rotation, leg extension, leg curl, chest press, upright row. Tricep extension, bicep curl, leg press, and hip abduction added visit 3    Home Exercises Provided and Patient Education Provided   Has lumbar roll in car  Tennis ball against wall or mat  to trigger point spot  Education provided:   - cont prior HEP  -Pt ed on inc neuromotor control/signal efficiency /c muscle leading to inc strength vs long term physiologic muscle changes and potential for slowed down comfort /c MedX lumbar weight inc being expected   Pt educated in benefits of cupping for pain and inflammation.  Issued BTB to increase resistance for exercises  Written Home Exercises Provided: Patient instructed to cont prior HEP.  Exercises were reviewed and Jason was able to demonstrate them prior to the end of the session.  Jason demonstrated good  understanding of the education provided.     See EMR under Patient Instructions for exercises provided prior  visit.    Assessment     Patient progressed with medx exercise at 160#, completing 18 reps at an RPE of 4 today. He had no new c/o at this time. Continue to progress as tolerated.     Patient is making good progress towards established goals at this time.  Pt will continue to benefit from skilled outpatient physical therapy to address the deficits stated in the impairment chart, provide pt/family education and to maximize pt's level of independence in the home and community environment.     Anticipated Barriers for therapy: none  Pt's spiritual, cultural and educational needs considered and pt agreeable to plan of care and goals as stated below:     Goals: Short term goals:  6 weeks or 10 visits   1.  Pt will demonstrate increased lumbar ROM by at least 3 degrees from the initial ROM value with improvements noted in functional ROM and ability to perform ADLs.   (MET 9/15/20)  2.  Pt will demonstrate increased MedX average isometric strength value  by 10% from initial test resulting in improved ability to perform bending, lifting, and carrying activities safely, confidently. (Progressing)   3.  Patient report a reduction in worst pain score by 1-2 points for improved tolerance for static standing.   (MET 9/15/20)  4.  Pt able to perform HEP correctly with minimal cueing or supervision from therapist to encourage independent management of symptoms.   (MET 9/15/20)      Long term goals: 10 weeks or 20 visits   1. Pt will demonstrate increased lumbar ROM by at least 6 degrees from initial ROM value, resulting in improved ability to perform functional fwd bending while standing and sitting.   (Progressing)  2. Pt will demonstrate increased MedX average isometric strength value  by 20% from initial test resulting in improved ability to perform bending, lifting, and carrying activities safely, confidently. (appropriate, progressing)  3. Pt to demonstrate ability to independently control and reduce their pain through  posture positioning and mechanical movements throughout a typical day.   (appropriate, progressing)  4.  Pt will demonstrate reduced pain and improved functional outcomes as reported on the FOTO by reaching a limitation score of < or = 35% or less in order to demonstrate subjective improvement in pt's condition.  (appropriate, progressing)  5. Pt will demonstrate independence with the HEP at discharge (Progressing)  6.  Pt will be able to sit in one position for 30 - 60min without increased pain. (20 MINUTES AT BEST; Progressing)      Plan   Continue with established Plan of Care towards established PT goals.     Dorinda Lema, PT  11/10/2020

## 2020-11-18 ENCOUNTER — PATIENT MESSAGE (OUTPATIENT)
Dept: ORTHOPEDICS | Facility: CLINIC | Age: 50
End: 2020-11-18

## 2020-11-20 ENCOUNTER — PATIENT MESSAGE (OUTPATIENT)
Dept: SPINE | Facility: CLINIC | Age: 50
End: 2020-11-20

## 2020-11-30 DIAGNOSIS — M47.816 SPONDYLOSIS OF LUMBAR REGION WITHOUT MYELOPATHY OR RADICULOPATHY: ICD-10-CM

## 2020-11-30 DIAGNOSIS — G89.29 CHRONIC RIGHT-SIDED LOW BACK PAIN WITHOUT SCIATICA: ICD-10-CM

## 2020-11-30 DIAGNOSIS — M51.37 DDD (DEGENERATIVE DISC DISEASE), LUMBOSACRAL: ICD-10-CM

## 2020-11-30 DIAGNOSIS — M79.18 MYOFASCIAL MUSCLE PAIN: ICD-10-CM

## 2020-11-30 DIAGNOSIS — M54.50 CHRONIC RIGHT-SIDED LOW BACK PAIN WITHOUT SCIATICA: ICD-10-CM

## 2020-12-01 ENCOUNTER — OFFICE VISIT (OUTPATIENT)
Dept: URGENT CARE | Facility: CLINIC | Age: 50
End: 2020-12-01
Payer: COMMERCIAL

## 2020-12-01 ENCOUNTER — CLINICAL SUPPORT (OUTPATIENT)
Dept: URGENT CARE | Facility: CLINIC | Age: 50
End: 2020-12-01
Payer: COMMERCIAL

## 2020-12-01 ENCOUNTER — PATIENT MESSAGE (OUTPATIENT)
Dept: PAIN MEDICINE | Facility: CLINIC | Age: 50
End: 2020-12-01

## 2020-12-01 VITALS — BODY MASS INDEX: 29.84 KG/M2 | HEIGHT: 75 IN | WEIGHT: 240 LBS

## 2020-12-01 DIAGNOSIS — Z11.59 SCREENING FOR VIRAL DISEASE: Primary | ICD-10-CM

## 2020-12-01 LAB
CTP QC/QA: YES
SARS-COV-2 RDRP RESP QL NAA+PROBE: NEGATIVE

## 2020-12-01 PROCEDURE — U0002: ICD-10-PCS | Mod: QW,S$GLB,, | Performed by: PHYSICIAN ASSISTANT

## 2020-12-01 PROCEDURE — U0002 COVID-19 LAB TEST NON-CDC: HCPCS | Mod: QW,S$GLB,, | Performed by: PHYSICIAN ASSISTANT

## 2020-12-01 RX ORDER — NAPROXEN 500 MG/1
500 TABLET ORAL 2 TIMES DAILY WITH MEALS
Qty: 60 TABLET | Refills: 2 | Status: SHIPPED | OUTPATIENT
Start: 2020-12-01 | End: 2020-12-28 | Stop reason: SDUPTHER

## 2020-12-01 RX ORDER — CLINDAMYCIN HYDROCHLORIDE 300 MG/1
CAPSULE ORAL
COMMUNITY
Start: 2020-11-03 | End: 2020-12-09

## 2020-12-01 RX ORDER — CHLORHEXIDINE GLUCONATE ORAL RINSE 1.2 MG/ML
SOLUTION DENTAL
COMMUNITY
Start: 2020-11-03 | End: 2020-12-09

## 2020-12-01 RX ORDER — IBUPROFEN 600 MG/1
TABLET ORAL
COMMUNITY
Start: 2020-11-03

## 2020-12-01 RX ORDER — TRAMADOL HYDROCHLORIDE 50 MG/1
TABLET ORAL
COMMUNITY
Start: 2020-11-03 | End: 2020-12-09

## 2020-12-01 RX ORDER — DICLOFENAC SODIUM 10 MG/G
2 GEL TOPICAL 4 TIMES DAILY
Qty: 1 TUBE | Refills: 2 | Status: SHIPPED | OUTPATIENT
Start: 2020-12-01 | End: 2020-12-28 | Stop reason: SDUPTHER

## 2020-12-01 RX ORDER — METAXALONE 800 MG/1
800 TABLET ORAL 3 TIMES DAILY PRN
Qty: 270 TABLET | Refills: 2 | Status: SHIPPED | OUTPATIENT
Start: 2020-12-01 | End: 2020-12-28 | Stop reason: SDUPTHER

## 2020-12-01 NOTE — PATIENT INSTRUCTIONS
COVID EXPOSURE TEST AND QUARANTINE:  CDC Testing and Quarantine Guidelines for Exposure:    A close exposure is defined as anyone who had a masked or an unmasked exposure to a known COVID -19 positive person, at less than 6 ft for more than 15 minutes. If your exposure meets this definition, then you are required to quarantine for 14 days per the CDC. They now recommend that a test can be performed if you are asymptomatic (someone who does not have any symptoms), and a test should be done if you develop symptoms after an exposure as described above.    If you meet the definition of a close exposure, it does not matter whether or not you are asymptomatic or symptomatic - A NEGATIVE TEST DOES NOT GET YOU OUT OF 14 DAYS OF QUARANTINE!    Please note that if you are asymptomatic and wait more than 4 days to test after an exposure, you risk lengthening your quarantine. This is because if you test positive as an asymptomatic, your isolation is 10 days from the date of the positive test, not the date of exposure. So for example, if you test positive as an asymptomatic on day 7 from exposure, you have now extended your 14 day quarantine to a 17 day isolation.     If your exposure does not meet the above definition, you may return to your normal activities including social distancing, wearing masks, and frequent handwashing.           Positive COVID ISOLATION:    You have tested positive for COVID-19 today.  Per the CDC, you are now in a 10 day isolation.  This isolation starts from the day you first developed symptoms, not the day of your positive test. For example, if your symptoms began on a Monday, and you waited until Friday of the same week to get tested, and it was positive, your 10 day isolation begins from that Monday, not the Friday you tested positive.      However, if you are asymptomatic (a person who does not have any symptoms), and received a COVID-19 test that was positive, your 10 day isolation begins on the  day you tested positive.  This is regardless if you were exposed to a known positive days earlier.  So for example, if you test positive as an asymptomatic on day 7 from exposure, you have now extended your 14 day quarantine to a 17 day quarantine.     Also, per the CDC guidelines, once your 10 days have passed, and you have not had fever greater than 100.4F in the last 24 hours without taking any fever reducers such as Tylenol (Acetaminophen) or Motrin (Ibuprofen), you may return to your normal activities including social distancing, wearing masks, and frequent handwashing - YOU DO NOT NEED ANOTHER TEST, OR TO TEST NEGATIVE, IN ORDER TO END YOUR QUARANTINE!            Negative COVID QUARANTINE:      You have tested negative for COVID-19 today.  If you did not have any close exposure as defined below, then effective today, you can return to your normal daily activities including social distancing, wearing masks, and frequent handwashing.     A close exposure is defined as anyone who had a masked or an unmasked exposure to a known COVID -19 positive person, at less than 6 ft for more than 15 minutes.  If your exposure meets this definition, then you are required to quarantine for 14 days per the CDC.     The 14 day quarantine begins from the day you were exposed, not the day of your test.  For example, if your exposure was on a Monday, and you waited until Friday of the same week to get tested and it was negative, your 14 day quarantine begins from that Monday, not the Friday you tested negative.       If you developed symptoms since the exposure, and your test was negative today, you still have to quarantine for 14 days from the date of the exposure.       So if you meet the definition of a close exposure, A NEGATIVE TEST DOES NOT GET YOU OUT OF 14 DAYS OF QUARANTINE!

## 2020-12-09 ENCOUNTER — CLINICAL SUPPORT (OUTPATIENT)
Dept: URGENT CARE | Facility: CLINIC | Age: 50
End: 2020-12-09
Payer: COMMERCIAL

## 2020-12-09 ENCOUNTER — OFFICE VISIT (OUTPATIENT)
Dept: SPINE | Facility: CLINIC | Age: 50
End: 2020-12-09
Payer: COMMERCIAL

## 2020-12-09 ENCOUNTER — HOSPITAL ENCOUNTER (OUTPATIENT)
Dept: RADIOLOGY | Facility: OTHER | Age: 50
Discharge: HOME | End: 2020-12-09
Attending: NURSE PRACTITIONER
Payer: COMMERCIAL

## 2020-12-09 VITALS
HEART RATE: 71 BPM | SYSTOLIC BLOOD PRESSURE: 150 MMHG | DIASTOLIC BLOOD PRESSURE: 92 MMHG | HEIGHT: 75 IN | WEIGHT: 240.06 LBS | BODY MASS INDEX: 29.85 KG/M2

## 2020-12-09 DIAGNOSIS — M25.551 HIP PAIN, RIGHT: ICD-10-CM

## 2020-12-09 DIAGNOSIS — M54.9 DORSALGIA, UNSPECIFIED: ICD-10-CM

## 2020-12-09 DIAGNOSIS — Z11.59 SCREENING FOR VIRAL DISEASE: Primary | ICD-10-CM

## 2020-12-09 DIAGNOSIS — M79.18 PIRIFORMIS MUSCLE PAIN: Primary | ICD-10-CM

## 2020-12-09 LAB
CTP QC/QA: YES
SARS-COV-2 RDRP RESP QL NAA+PROBE: NEGATIVE

## 2020-12-09 PROCEDURE — 73521 XR HIPS BILATERAL 2 VIEW INCL AP PELVIS: ICD-10-PCS | Mod: 26,,, | Performed by: INTERNAL MEDICINE

## 2020-12-09 PROCEDURE — 1125F PR PAIN SEVERITY QUANTIFIED, PAIN PRESENT: ICD-10-PCS | Mod: S$GLB,,, | Performed by: NURSE PRACTITIONER

## 2020-12-09 PROCEDURE — 73521 X-RAY EXAM HIPS BI 2 VIEWS: CPT | Mod: 26,,, | Performed by: INTERNAL MEDICINE

## 2020-12-09 PROCEDURE — 3008F BODY MASS INDEX DOCD: CPT | Mod: CPTII,S$GLB,, | Performed by: NURSE PRACTITIONER

## 2020-12-09 PROCEDURE — 99213 PR OFFICE/OUTPT VISIT, EST, LEVL III, 20-29 MIN: ICD-10-PCS | Mod: S$GLB,,, | Performed by: NURSE PRACTITIONER

## 2020-12-09 PROCEDURE — U0002 COVID-19 LAB TEST NON-CDC: HCPCS | Mod: QW,S$GLB,, | Performed by: STUDENT IN AN ORGANIZED HEALTH CARE EDUCATION/TRAINING PROGRAM

## 2020-12-09 PROCEDURE — 73521 X-RAY EXAM HIPS BI 2 VIEWS: CPT | Mod: TC,FY

## 2020-12-09 PROCEDURE — 1125F AMNT PAIN NOTED PAIN PRSNT: CPT | Mod: S$GLB,,, | Performed by: NURSE PRACTITIONER

## 2020-12-09 PROCEDURE — 99999 PR PBB SHADOW E&M-EST. PATIENT-LVL IV: CPT | Mod: PBBFAC,,, | Performed by: NURSE PRACTITIONER

## 2020-12-09 PROCEDURE — U0002: ICD-10-PCS | Mod: QW,S$GLB,, | Performed by: STUDENT IN AN ORGANIZED HEALTH CARE EDUCATION/TRAINING PROGRAM

## 2020-12-09 PROCEDURE — 99213 OFFICE O/P EST LOW 20 MIN: CPT | Mod: S$GLB,,, | Performed by: NURSE PRACTITIONER

## 2020-12-09 PROCEDURE — 99999 PR PBB SHADOW E&M-EST. PATIENT-LVL IV: ICD-10-PCS | Mod: PBBFAC,,, | Performed by: NURSE PRACTITIONER

## 2020-12-09 PROCEDURE — 3008F PR BODY MASS INDEX (BMI) DOCUMENTED: ICD-10-PCS | Mod: CPTII,S$GLB,, | Performed by: NURSE PRACTITIONER

## 2020-12-09 NOTE — PROGRESS NOTES
Chronic Pain-Established Note (Follow up visit)         Referring Physician: Self, Betsy    Chief Complaint:   Chief Complaint   Patient presents with    Low-back Pain    Hip Pain     right        SUBJECTIVE: Disclaimer: This note has been generated using voice-recognition software. There may be typographical errors that have been missed during   proof-reading    Interval History 12/9/2020:  The patient presents for evaluation of R sided lower back pain and adamant his hip is etiology. He has no internal hip pain, pain is focal to lower R lumbar and into R buttock into lateral hip. Pain is worse with sitting and stretching, taking skelaxin, naproxen and voltaren with benefit. The patient denies myelopathic symptoms such as handwriting changes or difficulty with buttons/coins/keys. Denies perineal paresthesias, bowel/bladder dysfunction.      Interval History 7/16/2020:  Pt presents for evaluation and TPI today prior to leaving to daughters wedding tomorrow. When he returns he will be re-starting healthy back and will re-evaluate to consider repeat RFA if needed. The patient denies myelopathic symptoms such as handwriting changes or difficulty with buttons/coins/keys. Denies perineal paresthesias, bowel/bladder dysfunction.      Interval History 7/14/2020:  The patient presents for follow-up of lower back pain.  Patient has had gradual increase of right lower back pain over past several weeks.  He has had prior RFA to left than right lower lumbar lasting for over 9 months.  The right side is primary generator.  He is taking Skelaxin, naproxen and will be going on a trip for daughter's wedding this Friday.  He voices concern as he is having to sit for long periods in plane. The patient denies myelopathic symptoms such as handwriting changes or difficulty with buttons/coins/keys. Denies perineal paresthesias, bowel/bladder dysfunction. He will be restarting healthy back after trip. Rates pain 5/10.  "      Interval History 10/28/2019:  The patient is here for follow up of right lower back pain.  He is now s/p right L2,3,4,5 RFA.  He is reporting about 60% relief.  He still has some intermittent muscle spasms on the right.  He has some benefit with Skelaxin.  He previously had benefit with [facility] and would like to repeat PT.  His pain today is 5/10.    Interval History 9/16/2019:  The patient returns for follow up of right sided back pain.  He is s/p right L2,3,4,5 MBB with 80% relief.  He also underwent MBB in 2015 with 80% relief.  He is interested in RFA.  He has an aching pain across the lower back, mainly on the right.  It worsens with sitting and standing.  He has some benefit with Naproxen.  He has intermittent muscle spasms as well.  He has tried stretching and PT exercises for months without benefit.  He denies any radiation of his pain or numbness.  His pain today is 3/10.    Initial encounter:    Jason Dutton presents to the clinic for the evaluation of right sided lower back pain. The pain started about 12 years ago following no inciting event and symptoms have been worsening. Patient was last seen in clinic over 3 years ago. Recently he was seen in [facility] and was prescribed a medrol dose pack but he never took it because his "flare up" had subsided. He always has some baseline pain that has increased recently. But during flare ups the pain becomes severe and even walking becomes difficult. 3 years ago he had BL lumbar MBB that provided him long-lasting relief so he never followed through with the RFA.    Brief history:    Pain Description:    The pain is located in the right lower back area without radiation.      At BEST  4/10     At WORST  9/10 on the WORST day.      On average pain is rated as 7/10.     Today the pain is rated as 8/10    The pain is described as aching, dull and throbbing      Symptoms interfere with daily activity, sleeping and work.     Exacerbating " factors: Bending and Lifting.      Mitigating factors ice and medications.     Patient denies night fever/night sweats, urinary incontinence, bowel incontinence, significant weight loss and significant motor weakness.  Patient denies any suicidal or homicidal ideations    Pain Medications:  Current:  Motrin  Alleve    Tried in Past:  NSAIDs -Never  TCA -Never  SNRI -Never  Anti-convulsants -Never  Muscle Relaxants -Never  Opioids-Never    Physical Therapy/Home Exercise: yes       report:  Not applicable    Pain Procedures:   2015 Bilateral L2,3,4,5 MBB- 80% relief  19 Right L2,3,4,5 MBB- 80% relief  10/2/19 Right L2,3,4,5 RFA- 80% relief    Chiropractor -never  Acupuncture - never  TENS unit -never  Spinal decompression -never  Joint replacement -never    Imagin19 MRI Lumbar  FINDINGS:  Alignment: Normal.    Vertebrae: L1 vertebral body hemangioma.  No diffuse marrow replacement process.  No fracture.    Discs: Disc desiccation and mild height loss at L3-4 and L4-5.    Cord: Normal.  Conus terminates at L1.    Degenerative findings:    T12-L1: No spinal canal stenosis or neural foraminal narrowing.    L1-L2: No spinal canal stenosis or neural foraminal narrowing.    L2-L3: No spinal canal stenosis or neural foraminal narrowing.    L3-L4: Posterior disc bulge and mild facet arthropathy without spinal canal stenosis or neural foraminal narrowing.    L4-L5: Annular fissure with posterior disc bulge and mild facet arthropathy.  No spinal canal stenosis or neural foraminal narrowing.    L5-S1: Mild facet arthropathy without spinal canal stenosis or neural narrowing.    Paraspinal muscles & soft tissues: Unremarkable.      Impression       1. Mild lumbar spondylosis with annular fissure at L4-5.     Lab Results   Component Value Date    WBC 4.38 2015    HGB 14.4 2015    HCT 40.5 2015    MCV 86 2015     2015     CMP  Sodium   Date Value Ref Range Status    12/18/2017 139 136 - 145 mmol/L Final     Potassium   Date Value Ref Range Status   12/18/2017 4.3 3.5 - 5.1 mmol/L Final     Chloride   Date Value Ref Range Status   12/18/2017 105 95 - 110 mmol/L Final     CO2   Date Value Ref Range Status   12/18/2017 24 23 - 29 mmol/L Final     Glucose   Date Value Ref Range Status   12/18/2017 90 70 - 110 mg/dL Final     BUN   Date Value Ref Range Status   12/18/2017 20 6 - 20 mg/dL Final     Creatinine   Date Value Ref Range Status   12/18/2017 1.5 (H) 0.5 - 1.4 mg/dL Final     Calcium   Date Value Ref Range Status   12/18/2017 9.2 8.7 - 10.5 mg/dL Final     Total Protein   Date Value Ref Range Status   12/18/2017 7.1 6.0 - 8.4 g/dL Final     Albumin   Date Value Ref Range Status   12/18/2017 4.1 3.5 - 5.2 g/dL Final     Total Bilirubin   Date Value Ref Range Status   12/18/2017 0.7 0.1 - 1.0 mg/dL Final     Comment:     For infants and newborns, interpretation of results should be based  on gestational age, weight and in agreement with clinical  observations.  Premature Infant recommended reference ranges:  Up to 24 hours.............<8.0 mg/dL  Up to 48 hours............<12.0 mg/dL  3-5 days..................<15.0 mg/dL  6-29 days.................<15.0 mg/dL       Alkaline Phosphatase   Date Value Ref Range Status   12/18/2017 68 55 - 135 U/L Final     AST   Date Value Ref Range Status   12/18/2017 29 10 - 40 U/L Final     ALT   Date Value Ref Range Status   12/18/2017 36 10 - 44 U/L Final     Anion Gap   Date Value Ref Range Status   12/18/2017 10 8 - 16 mmol/L Final     eGFR if    Date Value Ref Range Status   12/18/2017 >60 >60 mL/min/1.73 m^2 Final     eGFR if non    Date Value Ref Range Status   12/18/2017 54 (A) >60 mL/min/1.73 m^2 Final     Comment:     Calculation used to obtain the estimated glomerular filtration  rate (eGFR) is the CKD-EPI equation.          Past Medical History:   Diagnosis Date    DJD (degenerative joint  disease), lumbar      Past Surgical History:   Procedure Laterality Date    INJECTION OF ANESTHETIC AGENT AROUND NERVE Right 9/4/2019    Procedure: BLOCK, NERVE, L2,L3,L4,L5 MEDIAL BRANCH;  Surgeon: Jake Felder MD;  Location: [facility];  Service: Pain Management;  Laterality: Right;    RADIOFREQUENCY ABLATION Right 10/2/2019    Procedure: RADIOFREQUENCY ABLATION, RIGHT L2-L3-L4-L5 MEDIAL BRANCH;  Surgeon: Jake Felder MD;  Location: [facility];  Service: Pain Management;  Laterality: Right;     Social History     Socioeconomic History    Marital status:      Spouse name: Not on file    Number of children: Not on file    Years of education: Not on file    Highest education level: Not on file   Occupational History    Not on file   Social Needs    Financial resource strain: Not on file    Food insecurity     Worry: Not on file     Inability: Not on file    Transportation needs     Medical: Not on file     Non-medical: Not on file   Tobacco Use    Smoking status: Never Smoker    Smokeless tobacco: Never Used   Substance and Sexual Activity    Alcohol use: Yes     Comment: socially    Drug use: No    Sexual activity: Not on file   Lifestyle    Physical activity     Days per week: Not on file     Minutes per session: Not on file    Stress: Not on file   Relationships    Social connections     Talks on phone: Not on file     Gets together: Not on file     Attends Christianity service: Not on file     Active member of club or organization: Not on file     Attends meetings of clubs or organizations: Not on file     Relationship status: Not on file   Other Topics Concern    Not on file   Social History Narrative    Not on file     Family History   Problem Relation Age of Onset    Coronary artery disease Father        Review of patient's allergies indicates:   Allergen Reactions    Penicillins Hives       Current Outpatient Medications   Medication Sig    diclofenac sodium  "(VOLTAREN) 1 % Gel Apply 2 g topically 4 (four) times daily.    ibuprofen (ADVIL,MOTRIN) 600 MG tablet TK 1 T PO TID    metaxalone (SKELAXIN) 800 MG tablet Take 1 tablet (800 mg total) by mouth 3 (three) times daily as needed for Pain.    naproxen (NAPROSYN) 500 MG tablet Take 1 tablet (500 mg total) by mouth 2 (two) times daily with meals.     No current facility-administered medications for this visit.        REVIEW OF SYSTEMS:    GENERAL:  No weight loss, malaise or fevers.  HEENT:   No recent changes in vision or hearing  NECK:  Negative for lumps, no difficulty with swallowing.  RESPIRATORY:  Negative for cough, wheezing or shortness of breath, patient denies any recent URI.  CARDIOVASCULAR:  Negative for chest pain, leg swelling or palpitations.  GI:  Negative for abdominal discomfort, blood in stools or black stools or change in bowel habits.  MUSCULOSKELETAL:  See HPI.  SKIN:  Negative for lesions, rash, and itching.  PSYCH:  No mood disorder or recent psychosocial stressors.  Patients sleep is not disturbed secondary to pain.  HEMATOLOGY/LYMPHOLOGY:  Negative for prolonged bleeding, bruising easily or swollen nodes.  Patient is not currently taking any anti-coagulants  ENDO: No history of diabetes or thyroid dysfunction  NEURO:   No history of headaches, syncope, paralysis, seizures or tremors. tinnitus  All other reviewed and negative other than HPI.    OBJECTIVE:    BP (!) 150/92 (BP Location: Left arm, Patient Position: Sitting)   Pulse 71   Ht 6' 3" (1.905 m)   Wt 108.9 kg (240 lb 1.3 oz)   BMI 30.01 kg/m²        Prior PHYSICAL EXAMINATION:    GENERAL: Well appearing, in no acute distress, alert and oriented x3.  PSYCH:  Mood and affect appropriate.  SKIN: Skin color, texture, turgor normal, no rashes or lesions.  HEAD/FACE:  Normocephalic, atraumatic. Cranial nerves grossly intact.  CV: RRR with palpation of the radial artery.  PULM: No evidence of respiratory difficulty, symmetric chest " rise.  GI:  Soft and non-tender.  BACK: Straight leg raising in the supine position is negative to radicular pain. No TTP to Facets  EXTREMITIES: Peripheral joint ROM is full and pain free without obvious instability or laxity in all four extremities. No deformities, edema, or skin discoloration. Good capillary refill.  MUSCULOSKELETAL: No TTP to SIJ. + piriformis stretching reproduces pain and no pain along with limited ROM. No GTB TTP, (-) LAURY and FAIR.   There is no pain with palpation over the sacroiliac joints bilaterally.  FABERs test is negative.  FADIRs test is negative.   Bilateral upper and lower extremity strength is normal and symmetric.  No atrophy or tone abnormalities are noted.  NEURO: Bilateral upper and lower extremity coordination and muscle stretch reflexes are physiologic and symmetric.  Plantar response are downgoing. No clonus.  No loss of sensation is noted.  GAIT: Normal.    ASSESSMENT: 50 y.o. year old male with right sided lower back pain, consistent with the following diagnoses:    Encounter Diagnoses   Name Primary?    Dorsalgia, unspecified     Piriformis muscle pain Yes    Hip pain, right        PLAN:     - Prior records reviewed    - R sided lower back/buttock pain    - Order xray of hips to evaluate any etiology     - Continue Skelaxin     - Continue Naproxen    - Continue Voltaren gel    - Will refer to PT    - Consider ARGENIS if no improvement from PT    - Of note he has no facet loading and states he does not feel this is same pain from RFAs    Patient can continue with medications for now since they are providing benefits, using them appropriately, and without side effects..    - RTC 5-6 weeks for re-evaluation.       The above plan and management options were discussed at length with patient. Patient is in agreement with the above and verbalized understanding.     Steffen Vasquez NP  12/09/2020

## 2020-12-22 ENCOUNTER — OFFICE VISIT (OUTPATIENT)
Dept: INTERNAL MEDICINE | Facility: CLINIC | Age: 50
End: 2020-12-22
Payer: COMMERCIAL

## 2020-12-22 ENCOUNTER — DOCUMENTATION ONLY (OUTPATIENT)
Dept: INTERNAL MEDICINE | Facility: CLINIC | Age: 50
End: 2020-12-22

## 2020-12-22 VITALS
DIASTOLIC BLOOD PRESSURE: 100 MMHG | HEIGHT: 75 IN | HEART RATE: 75 BPM | OXYGEN SATURATION: 97 % | SYSTOLIC BLOOD PRESSURE: 138 MMHG | WEIGHT: 245.56 LBS | BODY MASS INDEX: 30.53 KG/M2

## 2020-12-22 DIAGNOSIS — M47.816 LUMBAR FACET ARTHROPATHY: ICD-10-CM

## 2020-12-22 DIAGNOSIS — Z12.5 SCREENING FOR PROSTATE CANCER: ICD-10-CM

## 2020-12-22 DIAGNOSIS — M51.26 LUMBAR DISCOGENIC PAIN SYNDROME: ICD-10-CM

## 2020-12-22 DIAGNOSIS — M47.816 LUMBAR SPONDYLOSIS: ICD-10-CM

## 2020-12-22 DIAGNOSIS — Z00.00 ROUTINE PHYSICAL EXAMINATION: Primary | ICD-10-CM

## 2020-12-22 DIAGNOSIS — Z20.822 EXPOSURE TO COVID-19 VIRUS: ICD-10-CM

## 2020-12-22 DIAGNOSIS — Z12.11 COLON CANCER SCREENING: ICD-10-CM

## 2020-12-22 PROCEDURE — 1126F AMNT PAIN NOTED NONE PRSNT: CPT | Mod: S$GLB,,, | Performed by: INTERNAL MEDICINE

## 2020-12-22 PROCEDURE — 99999 PR PBB SHADOW E&M-EST. PATIENT-LVL IV: ICD-10-PCS | Mod: PBBFAC,,, | Performed by: INTERNAL MEDICINE

## 2020-12-22 PROCEDURE — 99999 PR PBB SHADOW E&M-EST. PATIENT-LVL IV: CPT | Mod: PBBFAC,,, | Performed by: INTERNAL MEDICINE

## 2020-12-22 PROCEDURE — 3008F PR BODY MASS INDEX (BMI) DOCUMENTED: ICD-10-PCS | Mod: CPTII,S$GLB,, | Performed by: INTERNAL MEDICINE

## 2020-12-22 PROCEDURE — 1126F PR PAIN SEVERITY QUANTIFIED, NO PAIN PRESENT: ICD-10-PCS | Mod: S$GLB,,, | Performed by: INTERNAL MEDICINE

## 2020-12-22 PROCEDURE — 99386 PREV VISIT NEW AGE 40-64: CPT | Mod: S$GLB,,, | Performed by: INTERNAL MEDICINE

## 2020-12-22 PROCEDURE — 99386 PR PREVENTIVE VISIT,NEW,40-64: ICD-10-PCS | Mod: S$GLB,,, | Performed by: INTERNAL MEDICINE

## 2020-12-22 PROCEDURE — 3008F BODY MASS INDEX DOCD: CPT | Mod: CPTII,S$GLB,, | Performed by: INTERNAL MEDICINE

## 2020-12-22 NOTE — PROGRESS NOTES
Subjective:       Patient ID: Jason Dutton is a 50 y.o. male.    Chief Complaint: Annual Exam    HPI:  Patient comes in for annual exam.  I have not seen him in over 3 years so he is a new patient by billing criteria.  He has continued to see back specialist and has had MRI, PTEN injections.  He denies any new trauma or symptoms.  He continues symptomatic treatment.  We would like to get some labs, a Cologuard and COVID antibodies.  Of note is that 1 of his kids  And wife were sick.  He did not particularly quarantine or avoid then but he was tested twice and never was positive.  He wonders if he could have had antibodies a few weeks or months before.      Review of Systems   Constitutional: Negative for chills, fatigue, fever and unexpected weight change.   HENT: Negative for ear pain, postnasal drip and sore throat.    Eyes: Negative for pain and visual disturbance.   Respiratory: Negative for cough, shortness of breath and wheezing.    Cardiovascular: Negative for chest pain and leg swelling.   Gastrointestinal: Negative for abdominal pain, constipation, diarrhea, nausea and vomiting.   Endocrine: Negative for polyuria.   Genitourinary: Negative for difficulty urinating, frequency and hematuria.   Musculoskeletal: Positive for back pain. Negative for arthralgias, myalgias, neck pain and neck stiffness.   Integumentary:  Negative for rash and wound.   Allergic/Immunologic: Negative for frequent infections.   Neurological: Negative for dizziness, numbness and headaches.   Hematological: Negative for adenopathy.   Psychiatric/Behavioral: Negative for dysphoric mood. The patient is not nervous/anxious.          Objective:      Physical Exam  Constitutional:       General: He is not in acute distress.     Appearance: He is well-developed.   HENT:      Head: Normocephalic and atraumatic.      Right Ear: Tympanic membrane, ear canal and external ear normal.      Left Ear: Tympanic membrane, ear canal and external ear  normal.      Mouth/Throat:      Pharynx: No oropharyngeal exudate or posterior oropharyngeal erythema.   Eyes:      General: No scleral icterus.     Conjunctiva/sclera: Conjunctivae normal.      Pupils: Pupils are equal, round, and reactive to light.   Neck:      Musculoskeletal: Normal range of motion and neck supple.      Thyroid: No thyromegaly.      Comments: No supraclavicular nodes palpated  Cardiovascular:      Rate and Rhythm: Normal rate and regular rhythm.      Pulses: Normal pulses.      Heart sounds: Normal heart sounds. No murmur.   Pulmonary:      Effort: Pulmonary effort is normal.      Breath sounds: Normal breath sounds. No wheezing.   Abdominal:      General: Bowel sounds are normal.      Palpations: Abdomen is soft. There is no mass.      Tenderness: There is no abdominal tenderness.   Musculoskeletal:         General: Tenderness (Minimal right low back) present.      Right lower leg: No edema.      Left lower leg: No edema.   Lymphadenopathy:      Cervical: No cervical adenopathy.   Skin:     Coloration: Skin is not jaundiced or pale.   Neurological:      General: No focal deficit present.      Mental Status: He is alert and oriented to person, place, and time.   Psychiatric:         Mood and Affect: Mood normal.         Behavior: Behavior normal.         Assessment:       1. Routine physical examination    2. Colon cancer screening    3. Lumbar discogenic pain syndrome    4. Screening for prostate cancer    5. Exposure to COVID-19 virus    6. Lumbar spondylosis    7. Lumbar facet arthropathy        Plan:       Jason was seen today for annual exam.    Diagnoses and all orders for this visit:    Routine physical examination  -     Cologuard Screening (Multitarget Stool DNA); Future  -     Lipid Panel; Future  -     CBC Auto Differential; Future  -     PSA, Screening; Future  -     Hemoglobin A1C; Future  -     Comprehensive Metabolic Panel; Future  -     Cologuard Screening (Multitarget Stool  DNA)    Colon cancer screening  -     Cologuard Screening (Multitarget Stool DNA); Future  -     Cologuard Screening (Multitarget Stool DNA)    Lumbar discogenic pain syndrome    Screening for prostate cancer  -     PSA, Screening; Future    Exposure to COVID-19 virus  -     COVID-19 (SARS CoV-2) IgG Antibody; Future    Lumbar spondylosis    Lumbar facet arthropathy        review all studies.  Annual follow-up.  Monitor and work on weight reduction.

## 2020-12-23 ENCOUNTER — LAB VISIT (OUTPATIENT)
Dept: LAB | Facility: HOSPITAL | Age: 50
End: 2020-12-23
Attending: INTERNAL MEDICINE
Payer: COMMERCIAL

## 2020-12-23 DIAGNOSIS — Z00.00 ROUTINE PHYSICAL EXAMINATION: ICD-10-CM

## 2020-12-23 DIAGNOSIS — Z20.822 EXPOSURE TO COVID-19 VIRUS: ICD-10-CM

## 2020-12-23 DIAGNOSIS — Z12.5 SCREENING FOR PROSTATE CANCER: ICD-10-CM

## 2020-12-23 LAB
ALBUMIN SERPL BCP-MCNC: 4.3 G/DL (ref 3.5–5.2)
ALP SERPL-CCNC: 72 U/L (ref 55–135)
ALT SERPL W/O P-5'-P-CCNC: 47 U/L (ref 10–44)
ANION GAP SERPL CALC-SCNC: 7 MMOL/L (ref 8–16)
AST SERPL-CCNC: 39 U/L (ref 10–40)
BASOPHILS # BLD AUTO: 0.06 K/UL (ref 0–0.2)
BASOPHILS NFR BLD: 1.4 % (ref 0–1.9)
BILIRUB SERPL-MCNC: 0.9 MG/DL (ref 0.1–1)
BUN SERPL-MCNC: 21 MG/DL (ref 6–20)
CALCIUM SERPL-MCNC: 9 MG/DL (ref 8.7–10.5)
CHLORIDE SERPL-SCNC: 104 MMOL/L (ref 95–110)
CHOLEST SERPL-MCNC: 202 MG/DL (ref 120–199)
CHOLEST/HDLC SERPL: 6.7 {RATIO} (ref 2–5)
CO2 SERPL-SCNC: 27 MMOL/L (ref 23–29)
COMPLEXED PSA SERPL-MCNC: 0.44 NG/ML (ref 0–4)
CREAT SERPL-MCNC: 1.6 MG/DL (ref 0.5–1.4)
DIFFERENTIAL METHOD: NORMAL
EOSINOPHIL # BLD AUTO: 0.1 K/UL (ref 0–0.5)
EOSINOPHIL NFR BLD: 3.3 % (ref 0–8)
ERYTHROCYTE [DISTWIDTH] IN BLOOD BY AUTOMATED COUNT: 13.2 % (ref 11.5–14.5)
EST. GFR  (AFRICAN AMERICAN): 56.8 ML/MIN/1.73 M^2
EST. GFR  (NON AFRICAN AMERICAN): 49.2 ML/MIN/1.73 M^2
ESTIMATED AVG GLUCOSE: 108 MG/DL (ref 68–131)
GLUCOSE SERPL-MCNC: 102 MG/DL (ref 70–110)
HBA1C MFR BLD HPLC: 5.4 % (ref 4–5.6)
HCT VFR BLD AUTO: 45.3 % (ref 40–54)
HDLC SERPL-MCNC: 30 MG/DL (ref 40–75)
HDLC SERPL: 14.9 % (ref 20–50)
HGB BLD-MCNC: 15.3 G/DL (ref 14–18)
IMM GRANULOCYTES # BLD AUTO: 0 K/UL (ref 0–0.04)
IMM GRANULOCYTES NFR BLD AUTO: 0 % (ref 0–0.5)
LDLC SERPL CALC-MCNC: ABNORMAL MG/DL (ref 63–159)
LYMPHOCYTES # BLD AUTO: 1.4 K/UL (ref 1–4.8)
LYMPHOCYTES NFR BLD: 33.2 % (ref 18–48)
MCH RBC QN AUTO: 31 PG (ref 27–31)
MCHC RBC AUTO-ENTMCNC: 33.8 G/DL (ref 32–36)
MCV RBC AUTO: 92 FL (ref 82–98)
MONOCYTES # BLD AUTO: 0.4 K/UL (ref 0.3–1)
MONOCYTES NFR BLD: 8.7 % (ref 4–15)
NEUTROPHILS # BLD AUTO: 2.3 K/UL (ref 1.8–7.7)
NEUTROPHILS NFR BLD: 53.4 % (ref 38–73)
NONHDLC SERPL-MCNC: 172 MG/DL
NRBC BLD-RTO: 0 /100 WBC
PLATELET # BLD AUTO: 181 K/UL (ref 150–350)
PMV BLD AUTO: 10.2 FL (ref 9.2–12.9)
POTASSIUM SERPL-SCNC: 4.4 MMOL/L (ref 3.5–5.1)
PROT SERPL-MCNC: 7.6 G/DL (ref 6–8.4)
RBC # BLD AUTO: 4.93 M/UL (ref 4.6–6.2)
SARS-COV-2 IGG SERPLBLD QL IA.RAPID: NEGATIVE
SODIUM SERPL-SCNC: 138 MMOL/L (ref 136–145)
TRIGL SERPL-MCNC: 494 MG/DL (ref 30–150)
WBC # BLD AUTO: 4.25 K/UL (ref 3.9–12.7)

## 2020-12-23 PROCEDURE — 83036 HEMOGLOBIN GLYCOSYLATED A1C: CPT

## 2020-12-23 PROCEDURE — 80061 LIPID PANEL: CPT

## 2020-12-23 PROCEDURE — 86769 SARS-COV-2 COVID-19 ANTIBODY: CPT

## 2020-12-23 PROCEDURE — 84153 ASSAY OF PSA TOTAL: CPT

## 2020-12-23 PROCEDURE — 85025 COMPLETE CBC W/AUTO DIFF WBC: CPT

## 2020-12-23 PROCEDURE — 80053 COMPREHEN METABOLIC PANEL: CPT

## 2020-12-28 ENCOUNTER — PATIENT MESSAGE (OUTPATIENT)
Dept: SPINE | Facility: CLINIC | Age: 50
End: 2020-12-28

## 2020-12-28 ENCOUNTER — PATIENT MESSAGE (OUTPATIENT)
Dept: INTERNAL MEDICINE | Facility: CLINIC | Age: 50
End: 2020-12-28

## 2020-12-29 DIAGNOSIS — M54.16 LUMBAR RADICULOPATHY: Primary | ICD-10-CM

## 2020-12-30 ENCOUNTER — PATIENT MESSAGE (OUTPATIENT)
Dept: PAIN MEDICINE | Facility: OTHER | Age: 50
End: 2020-12-30

## 2020-12-30 DIAGNOSIS — M54.16 LUMBAR RADICULOPATHY: Primary | ICD-10-CM

## 2020-12-31 ENCOUNTER — CLINICAL SUPPORT (OUTPATIENT)
Dept: URGENT CARE | Facility: CLINIC | Age: 50
End: 2020-12-31
Payer: COMMERCIAL

## 2020-12-31 DIAGNOSIS — Z11.59 ENCOUNTER FOR SCREENING FOR OTHER VIRAL DISEASES: Primary | ICD-10-CM

## 2020-12-31 LAB
CTP QC/QA: YES
SARS-COV-2 RDRP RESP QL NAA+PROBE: NEGATIVE

## 2020-12-31 PROCEDURE — U0002 COVID-19 LAB TEST NON-CDC: HCPCS | Mod: QW,S$GLB,, | Performed by: PHYSICIAN ASSISTANT

## 2020-12-31 PROCEDURE — U0002: ICD-10-PCS | Mod: QW,S$GLB,, | Performed by: PHYSICIAN ASSISTANT

## 2021-01-04 ENCOUNTER — PATIENT MESSAGE (OUTPATIENT)
Dept: SPINE | Facility: CLINIC | Age: 51
End: 2021-01-04

## 2021-01-05 ENCOUNTER — PATIENT MESSAGE (OUTPATIENT)
Dept: SPINE | Facility: CLINIC | Age: 51
End: 2021-01-05

## 2021-01-05 DIAGNOSIS — M54.9 DORSALGIA, UNSPECIFIED: ICD-10-CM

## 2021-01-06 ENCOUNTER — TELEPHONE (OUTPATIENT)
Dept: PAIN MEDICINE | Facility: CLINIC | Age: 51
End: 2021-01-06

## 2021-01-18 ENCOUNTER — PATIENT MESSAGE (OUTPATIENT)
Dept: SPINE | Facility: CLINIC | Age: 51
End: 2021-01-18

## 2021-01-19 ENCOUNTER — TELEPHONE (OUTPATIENT)
Dept: INTERNAL MEDICINE | Facility: CLINIC | Age: 51
End: 2021-01-19

## 2021-01-19 ENCOUNTER — TELEPHONE (OUTPATIENT)
Dept: PAIN MEDICINE | Facility: CLINIC | Age: 51
End: 2021-01-19

## 2021-01-19 DIAGNOSIS — M54.9 DORSALGIA, UNSPECIFIED: ICD-10-CM

## 2021-01-22 ENCOUNTER — PATIENT MESSAGE (OUTPATIENT)
Dept: INTERNAL MEDICINE | Facility: CLINIC | Age: 51
End: 2021-01-22

## 2021-01-25 ENCOUNTER — PATIENT MESSAGE (OUTPATIENT)
Dept: INTERNAL MEDICINE | Facility: CLINIC | Age: 51
End: 2021-01-25

## 2021-01-25 ENCOUNTER — HOSPITAL ENCOUNTER (OUTPATIENT)
Dept: RADIOLOGY | Facility: HOSPITAL | Age: 51
Discharge: HOME | End: 2021-01-25
Attending: NURSE PRACTITIONER
Payer: COMMERCIAL

## 2021-01-25 DIAGNOSIS — M54.9 DORSALGIA, UNSPECIFIED: ICD-10-CM

## 2021-01-25 PROCEDURE — 72148 MRI LUMBAR SPINE W/O DYE: CPT | Mod: TC

## 2021-01-25 PROCEDURE — 72148 MRI LUMBAR SPINE WITHOUT CONTRAST: ICD-10-PCS | Mod: 26,,, | Performed by: RADIOLOGY

## 2021-01-25 PROCEDURE — 72148 MRI LUMBAR SPINE W/O DYE: CPT | Mod: 26,,, | Performed by: RADIOLOGY

## 2021-02-01 ENCOUNTER — PATIENT MESSAGE (OUTPATIENT)
Dept: INTERNAL MEDICINE | Facility: CLINIC | Age: 51
End: 2021-02-01

## 2021-02-18 ENCOUNTER — DOCUMENTATION ONLY (OUTPATIENT)
Dept: REHABILITATION | Facility: OTHER | Age: 51
End: 2021-02-18

## 2021-02-18 PROBLEM — M53.3 SI (SACROILIAC) JOINT DYSFUNCTION: Status: RESOLVED | Noted: 2020-08-14 | Resolved: 2021-02-18

## 2021-02-18 PROBLEM — R29.898 DECREASED STRENGTH OF TRUNK AND BACK: Status: RESOLVED | Noted: 2020-08-14 | Resolved: 2021-02-18

## 2021-02-18 PROBLEM — M54.50 CHRONIC RIGHT-SIDED LOW BACK PAIN WITHOUT SCIATICA: Status: RESOLVED | Noted: 2020-08-14 | Resolved: 2021-02-18

## 2021-02-18 PROBLEM — G89.29 CHRONIC RIGHT-SIDED LOW BACK PAIN WITHOUT SCIATICA: Status: RESOLVED | Noted: 2020-08-14 | Resolved: 2021-02-18

## 2021-02-22 ENCOUNTER — TELEPHONE (OUTPATIENT)
Dept: INTERNAL MEDICINE | Facility: CLINIC | Age: 51
End: 2021-02-22

## 2021-02-22 ENCOUNTER — PATIENT MESSAGE (OUTPATIENT)
Dept: INTERNAL MEDICINE | Facility: CLINIC | Age: 51
End: 2021-02-22

## 2021-03-03 ENCOUNTER — PATIENT OUTREACH (OUTPATIENT)
Dept: ADMINISTRATIVE | Facility: HOSPITAL | Age: 51
End: 2021-03-03

## 2021-03-09 ENCOUNTER — PATIENT MESSAGE (OUTPATIENT)
Dept: SPINE | Facility: CLINIC | Age: 51
End: 2021-03-09

## 2021-03-11 ENCOUNTER — TELEPHONE (OUTPATIENT)
Dept: PAIN MEDICINE | Facility: CLINIC | Age: 51
End: 2021-03-11

## 2021-03-15 ENCOUNTER — TELEPHONE (OUTPATIENT)
Dept: PAIN MEDICINE | Facility: CLINIC | Age: 51
End: 2021-03-15

## 2021-03-24 ENCOUNTER — HOSPITAL ENCOUNTER (OUTPATIENT)
Facility: OTHER | Age: 51
Discharge: HOME | End: 2021-03-24
Attending: ANESTHESIOLOGY | Admitting: ANESTHESIOLOGY
Payer: COMMERCIAL

## 2021-03-24 VITALS
SYSTOLIC BLOOD PRESSURE: 138 MMHG | RESPIRATION RATE: 16 BRPM | WEIGHT: 240 LBS | OXYGEN SATURATION: 97 % | HEIGHT: 76 IN | BODY MASS INDEX: 29.22 KG/M2 | DIASTOLIC BLOOD PRESSURE: 88 MMHG | TEMPERATURE: 98 F | HEART RATE: 62 BPM

## 2021-03-24 DIAGNOSIS — M51.37 DDD (DEGENERATIVE DISC DISEASE), LUMBOSACRAL: ICD-10-CM

## 2021-03-24 DIAGNOSIS — M54.17 LUMBOSACRAL RADICULOPATHY: Primary | ICD-10-CM

## 2021-03-24 DIAGNOSIS — G89.29 CHRONIC PAIN: ICD-10-CM

## 2021-03-24 PROCEDURE — 25500020 PHARM REV CODE 255: Performed by: ANESTHESIOLOGY

## 2021-03-24 PROCEDURE — 62323 NJX INTERLAMINAR LMBR/SAC: CPT | Mod: ,,, | Performed by: ANESTHESIOLOGY

## 2021-03-24 PROCEDURE — 63600175 PHARM REV CODE 636 W HCPCS: Performed by: ANESTHESIOLOGY

## 2021-03-24 PROCEDURE — 62323 PR INJ LUMBAR/SACRAL, W/IMAGING GUIDANCE: ICD-10-PCS | Mod: ,,, | Performed by: ANESTHESIOLOGY

## 2021-03-24 PROCEDURE — 62323 NJX INTERLAMINAR LMBR/SAC: CPT | Performed by: ANESTHESIOLOGY

## 2021-03-24 PROCEDURE — 25000003 PHARM REV CODE 250: Performed by: ANESTHESIOLOGY

## 2021-03-24 RX ORDER — SODIUM CHLORIDE 9 MG/ML
INJECTION, SOLUTION INTRAVENOUS CONTINUOUS
Status: DISCONTINUED | OUTPATIENT
Start: 2021-03-24 | End: 2021-03-24 | Stop reason: HOSPADM

## 2021-03-24 RX ORDER — LIDOCAINE HYDROCHLORIDE 10 MG/ML
INJECTION INFILTRATION; PERINEURAL
Status: DISCONTINUED | OUTPATIENT
Start: 2021-03-24 | End: 2021-03-24 | Stop reason: HOSPADM

## 2021-03-24 RX ORDER — MIDAZOLAM HYDROCHLORIDE 1 MG/ML
INJECTION INTRAMUSCULAR; INTRAVENOUS
Status: DISCONTINUED | OUTPATIENT
Start: 2021-03-24 | End: 2021-03-24 | Stop reason: HOSPADM

## 2021-03-24 RX ORDER — LIDOCAINE HYDROCHLORIDE 5 MG/ML
INJECTION, SOLUTION INFILTRATION; INTRAVENOUS
Status: DISCONTINUED | OUTPATIENT
Start: 2021-03-24 | End: 2021-03-24 | Stop reason: HOSPADM

## 2021-03-24 RX ORDER — DEXAMETHASONE SODIUM PHOSPHATE 4 MG/ML
INJECTION, SOLUTION INTRA-ARTICULAR; INTRALESIONAL; INTRAMUSCULAR; INTRAVENOUS; SOFT TISSUE
Status: DISCONTINUED | OUTPATIENT
Start: 2021-03-24 | End: 2021-03-24 | Stop reason: HOSPADM

## 2021-03-24 RX ADMIN — SODIUM CHLORIDE: 0.9 INJECTION, SOLUTION INTRAVENOUS at 08:03

## 2021-04-07 ENCOUNTER — TELEPHONE (OUTPATIENT)
Dept: PAIN MEDICINE | Facility: CLINIC | Age: 51
End: 2021-04-07

## 2021-04-08 ENCOUNTER — OFFICE VISIT (OUTPATIENT)
Dept: PAIN MEDICINE | Facility: CLINIC | Age: 51
End: 2021-04-08
Payer: COMMERCIAL

## 2021-04-08 VITALS
WEIGHT: 240.06 LBS | TEMPERATURE: 98 F | HEART RATE: 69 BPM | BODY MASS INDEX: 29.23 KG/M2 | SYSTOLIC BLOOD PRESSURE: 150 MMHG | HEIGHT: 76 IN | DIASTOLIC BLOOD PRESSURE: 86 MMHG

## 2021-04-08 DIAGNOSIS — M47.816 LUMBAR SPONDYLOSIS: ICD-10-CM

## 2021-04-08 DIAGNOSIS — M54.17 LUMBOSACRAL RADICULOPATHY: Primary | ICD-10-CM

## 2021-04-08 DIAGNOSIS — G89.4 CHRONIC PAIN SYNDROME: ICD-10-CM

## 2021-04-08 DIAGNOSIS — M79.18 MYOFASCIAL PAIN: ICD-10-CM

## 2021-04-08 DIAGNOSIS — M47.816 SPONDYLOSIS OF LUMBAR REGION WITHOUT MYELOPATHY OR RADICULOPATHY: ICD-10-CM

## 2021-04-08 PROCEDURE — 1125F PR PAIN SEVERITY QUANTIFIED, PAIN PRESENT: ICD-10-PCS | Mod: S$GLB,,, | Performed by: NURSE PRACTITIONER

## 2021-04-08 PROCEDURE — 3008F BODY MASS INDEX DOCD: CPT | Mod: CPTII,S$GLB,, | Performed by: NURSE PRACTITIONER

## 2021-04-08 PROCEDURE — 99999 PR PBB SHADOW E&M-EST. PATIENT-LVL III: ICD-10-PCS | Mod: PBBFAC,,, | Performed by: NURSE PRACTITIONER

## 2021-04-08 PROCEDURE — 1125F AMNT PAIN NOTED PAIN PRSNT: CPT | Mod: S$GLB,,, | Performed by: NURSE PRACTITIONER

## 2021-04-08 PROCEDURE — 99213 PR OFFICE/OUTPT VISIT, EST, LEVL III, 20-29 MIN: ICD-10-PCS | Mod: S$GLB,,, | Performed by: NURSE PRACTITIONER

## 2021-04-08 PROCEDURE — 99213 OFFICE O/P EST LOW 20 MIN: CPT | Mod: S$GLB,,, | Performed by: NURSE PRACTITIONER

## 2021-04-08 PROCEDURE — 3008F PR BODY MASS INDEX (BMI) DOCUMENTED: ICD-10-PCS | Mod: CPTII,S$GLB,, | Performed by: NURSE PRACTITIONER

## 2021-04-08 PROCEDURE — 99999 PR PBB SHADOW E&M-EST. PATIENT-LVL III: CPT | Mod: PBBFAC,,, | Performed by: NURSE PRACTITIONER

## 2021-11-16 DIAGNOSIS — M54.50 CHRONIC RIGHT-SIDED LOW BACK PAIN WITHOUT SCIATICA: ICD-10-CM

## 2021-11-16 DIAGNOSIS — G89.29 CHRONIC RIGHT-SIDED LOW BACK PAIN WITHOUT SCIATICA: ICD-10-CM

## 2021-11-16 DIAGNOSIS — M51.37 DDD (DEGENERATIVE DISC DISEASE), LUMBOSACRAL: ICD-10-CM

## 2021-11-16 DIAGNOSIS — M79.18 MYOFASCIAL MUSCLE PAIN: ICD-10-CM

## 2021-11-16 DIAGNOSIS — M47.816 SPONDYLOSIS OF LUMBAR REGION WITHOUT MYELOPATHY OR RADICULOPATHY: ICD-10-CM

## 2021-11-16 RX ORDER — DICLOFENAC SODIUM 10 MG/G
2 GEL TOPICAL 4 TIMES DAILY
Qty: 1 EACH | Refills: 2 | Status: CANCELLED | OUTPATIENT
Start: 2021-11-16

## 2021-11-16 RX ORDER — NAPROXEN 500 MG/1
500 TABLET ORAL 2 TIMES DAILY WITH MEALS
Qty: 60 TABLET | Refills: 2 | Status: CANCELLED | OUTPATIENT
Start: 2021-11-16

## 2021-11-16 RX ORDER — METAXALONE 800 MG/1
800 TABLET ORAL 3 TIMES DAILY PRN
Qty: 270 TABLET | Refills: 2 | Status: CANCELLED | OUTPATIENT
Start: 2021-11-16

## 2022-01-26 ENCOUNTER — PATIENT MESSAGE (OUTPATIENT)
Dept: ADMINISTRATIVE | Facility: HOSPITAL | Age: 52
End: 2022-01-26
Payer: COMMERCIAL

## 2022-02-02 ENCOUNTER — TELEPHONE (OUTPATIENT)
Dept: PAIN MEDICINE | Facility: CLINIC | Age: 52
End: 2022-02-02
Payer: COMMERCIAL

## 2022-02-02 NOTE — TELEPHONE ENCOUNTER
"This message is for patient in regards to his/her appointment 2/3/22 at 9 am.      [facility] Policy: For the safety of all patients and staff members.     Patient Visitor policy: Due to social distancing and limited seating staff are requesting patient to arrive to their schedule appointments alone. If patient do not need assistance with their visit, we're asking all visitors to remain outside the waiting area.    Upon arriving to facility; patient will have temperature taking and are required to wear a face mask, if patient do not have a face mask one will be provided. Upon arriving patient we ask patients to contact clinic at this number [phone] to notify staff that they have arrived or they may do do by utilizing the Mobile checked in [facility](if patient have patient portal; clinical staff will send a message through there letting them know it's okay to proceed to their visit). Staff will give the patient the "okay" to come up or wait inside their vehicle until clinic is ready for patient to be seen by  If you have any questions or concerns please contact [phone]        "

## 2022-02-03 ENCOUNTER — OFFICE VISIT (OUTPATIENT)
Dept: PAIN MEDICINE | Facility: CLINIC | Age: 52
End: 2022-02-03
Attending: ANESTHESIOLOGY
Payer: COMMERCIAL

## 2022-02-03 VITALS
HEART RATE: 86 BPM | RESPIRATION RATE: 18 BRPM | OXYGEN SATURATION: 100 % | DIASTOLIC BLOOD PRESSURE: 105 MMHG | HEIGHT: 76 IN | BODY MASS INDEX: 29.47 KG/M2 | WEIGHT: 242 LBS | SYSTOLIC BLOOD PRESSURE: 152 MMHG | TEMPERATURE: 97 F

## 2022-02-03 DIAGNOSIS — M47.9 OSTEOARTHRITIS OF SPINE, UNSPECIFIED SPINAL OSTEOARTHRITIS COMPLICATION STATUS, UNSPECIFIED SPINAL REGION: ICD-10-CM

## 2022-02-03 DIAGNOSIS — M47.816 SPONDYLOSIS OF LUMBAR REGION WITHOUT MYELOPATHY OR RADICULOPATHY: ICD-10-CM

## 2022-02-03 DIAGNOSIS — M70.60 GREATER TROCHANTERIC BURSITIS, UNSPECIFIED LATERALITY: ICD-10-CM

## 2022-02-03 DIAGNOSIS — M46.1 SACROILIITIS: Primary | ICD-10-CM

## 2022-02-03 PROCEDURE — 3080F DIAST BP >= 90 MM HG: CPT | Mod: CPTII,S$GLB,, | Performed by: ANESTHESIOLOGY

## 2022-02-03 PROCEDURE — 99214 PR OFFICE/OUTPT VISIT, EST, LEVL IV, 30-39 MIN: ICD-10-PCS | Mod: S$GLB,,, | Performed by: ANESTHESIOLOGY

## 2022-02-03 PROCEDURE — 3077F PR MOST RECENT SYSTOLIC BLOOD PRESSURE >= 140 MM HG: ICD-10-PCS | Mod: CPTII,S$GLB,, | Performed by: ANESTHESIOLOGY

## 2022-02-03 PROCEDURE — 3008F BODY MASS INDEX DOCD: CPT | Mod: CPTII,S$GLB,, | Performed by: ANESTHESIOLOGY

## 2022-02-03 PROCEDURE — 3077F SYST BP >= 140 MM HG: CPT | Mod: CPTII,S$GLB,, | Performed by: ANESTHESIOLOGY

## 2022-02-03 PROCEDURE — 99214 OFFICE O/P EST MOD 30 MIN: CPT | Mod: S$GLB,,, | Performed by: ANESTHESIOLOGY

## 2022-02-03 PROCEDURE — 99999 PR PBB SHADOW E&M-EST. PATIENT-LVL III: CPT | Mod: PBBFAC,,, | Performed by: ANESTHESIOLOGY

## 2022-02-03 PROCEDURE — 99999 PR PBB SHADOW E&M-EST. PATIENT-LVL III: ICD-10-PCS | Mod: PBBFAC,,, | Performed by: ANESTHESIOLOGY

## 2022-02-03 PROCEDURE — 3080F PR MOST RECENT DIASTOLIC BLOOD PRESSURE >= 90 MM HG: ICD-10-PCS | Mod: CPTII,S$GLB,, | Performed by: ANESTHESIOLOGY

## 2022-02-03 PROCEDURE — 1159F MED LIST DOCD IN RCRD: CPT | Mod: CPTII,S$GLB,, | Performed by: ANESTHESIOLOGY

## 2022-02-03 PROCEDURE — 1159F PR MEDICATION LIST DOCUMENTED IN MEDICAL RECORD: ICD-10-PCS | Mod: CPTII,S$GLB,, | Performed by: ANESTHESIOLOGY

## 2022-02-03 PROCEDURE — 3008F PR BODY MASS INDEX (BMI) DOCUMENTED: ICD-10-PCS | Mod: CPTII,S$GLB,, | Performed by: ANESTHESIOLOGY

## 2022-02-03 RX ORDER — TIZANIDINE 4 MG/1
4 TABLET ORAL
Qty: 30 TABLET | Refills: 1 | Status: SHIPPED | OUTPATIENT
Start: 2022-02-03 | End: 2022-03-05

## 2022-02-03 NOTE — H&P (VIEW-ONLY)
Chronic patient Established Note (Follow up visit)      SUBJECTIVE:    Interval History 2/3/2022:  Patient is here for Chronic LBP . His pain was exacerbated since last week after he played tennin. Pain is located below the belt line and on the right side.Its non-radiating and increase with standing,walking and prolong sitting. He had RFA of lumbar MBB and ARGENIS in the past past with somewhat relief of his pain.he is taking naproxen with some relief and a muscle relaxant which is not helping him .he is s/p L4-5 ILESI with mild benefit    - He did find PT helpful but did not feel it was overly resolving pain and therefore stopped    - He has had RFAs in past with benefit  does not feel like this is same pain.       -The patient denies bowel and bladder incontinence, saddle anesthesia, and new neurologic symptoms.    Interval History 4/8/2021:  The patient presents for follow up of lower back pain and s/p L4/5 ILESI. States he is improved but not where he wants to be. States approx 20% benefit. Pt has not continued PT, he did have benefit but again not where he would have liked to been. He states he knows his triggers and avoids these such as sitting, Denies new areas of pain or neurological changes.     Interval History 12/9/2020:  The patient presents for evaluation of R sided lower back pain and adamant his hip is etiology. He has no internal hip pain, pain is focal to lower R lumbar and into R buttock into lateral hip. Pain is worse with sitting and stretching, taking skelaxin, naproxen and voltaren with benefit. The patient denies myelopathic symptoms such as handwriting changes or difficulty with buttons/coins/keys. Denies perineal paresthesias, bowel/bladder dysfunction.      Interval History 7/16/2020:  Pt presents for evaluation and TPI today prior to leaving to daughters wedding tomorrow. When he returns he will be re-starting healthy back and will re-evaluate to consider repeat RFA if needed. The patient  denies myelopathic symptoms such as handwriting changes or difficulty with buttons/coins/keys. Denies perineal paresthesias, bowel/bladder dysfunction.      Interval History 7/14/2020:  The patient presents for follow-up of lower back pain.  Patient has had gradual increase of right lower back pain over past several weeks.  He has had prior RFA to left than right lower lumbar lasting for over 9 months.  The right side is primary generator.  He is taking Skelaxin, naproxen and will be going on a trip for daughter's wedding this Friday.  He voices concern as he is having to sit for long periods in plane. The patient denies myelopathic symptoms such as handwriting changes or difficulty with buttons/coins/keys. Denies perineal paresthesias, bowel/bladder dysfunction. He will be restarting healthy back after trip. Rates pain 5/10.       Interval History 10/28/2019:  The patient is here for follow up of right lower back pain.  He is now s/p right L2,3,4,5 RFA.  He is reporting about 60% relief.  He still has some intermittent muscle spasms on the right.  He has some benefit with Skelaxin.  He previously had benefit with [facility] and would like to repeat PT.  His pain today is 5/10.    Interval History 9/16/2019:  The patient returns for follow up of right sided back pain.  He is s/p right L2,3,4,5 MBB with 80% relief.  He also underwent MBB in 2015 with 80% relief.  He is interested in RFA.  He has an aching pain across the lower back, mainly on the right.  It worsens with sitting and standing.  He has some benefit with Naproxen.  He has intermittent muscle spasms as well.  He has tried stretching and PT exercises for months without benefit.  He denies any radiation of his pain or numbness.  His pain today is 3/10.    Initial encounter:    Jason Dutton presents to the clinic for the evaluation of right sided lower back pain. The pain started about 12 years ago following no inciting event and symptoms have been  "worsening. Patient was last seen in clinic over 3 years ago. Recently he was seen in [facility] Sp[facility] clinic and was prescribed a medrol dose pack but he never took it because his "flare up" had subsided. He always has some baseline pain that has increased recently. But during flare ups the pain becomes severe and even walking becomes difficult. 3 years ago he had BL lumbar MBB that provided him long-lasting relief so he never followed through with the RFA.    Brief history:    Pain Description:    The pain is located in the right lower back area without radiation.      At BEST  4/10     At WORST  9/10 on the WORST day.      On average pain is rated as 7/10.     Today the pain is rated as 8/10    The pain is described as aching, dull and throbbing      Symptoms interfere with daily activity, sleeping and work.     Exacerbating factors: Bending and Lifting.      Mitigating factors ice and medications.     Patient denies night fever/night sweats, urinary incontinence, bowel incontinence, significant weight loss and significant motor weakness.  Patient denies any suicidal or homicidal ideations    Pain Medications:  Current:  Motrin  Alleve    Tried in Past:  NSAIDs -Never  TCA -Never  SNRI -Never  Anti-convulsants -Never  Muscle Relaxants -Never  Opioids-Never    Physical Therapy/Home Exercise: yes       report:  Not applicable    Pain Procedures:   2015 Bilateral L2,3,4,5 MBB- 80% relief  19 Right L2,3,4,5 MBB- 80% relief  10/2/19 Right L2,3,4,5 RFA- 80% relief  3/24/2021 L4/5 ILESI approx 20% improved    Chiropractor -never  Acupuncture - never  TENS unit -never  Spinal decompression -never  Joint replacement -never    Imagin/19/21  EXAMINATION:  MRI LUMBAR SPINE WITHOUT CONTRAST     CLINICAL HISTORY:  Back pain or radiculopathy, > 6 wks; Dorsalgia, unspecified     TECHNIQUE:  Multiplanar, multisequence MR images were acquired from the thoracolumbar junction to the sacrum without the " administration of contrast.     COMPARISON:  MRI 08/12/2019     FINDINGS:  Alignment: Normal.     Vertebrae:  L1 vertebral body hemangioma.  No diffuse marrow signal abnormality.  No acute fractures.     Discs: Disc desiccation of L3-4 and L4-5.  Annular fissure of L4-5.     Cord: Normal.  Conus terminates at L1     Degenerative findings:     T12-L1: No focal disc abnormalities, spinal canal stenosis, neural foraminal narrowing.     L1-L2: Mild bilateral facet arthropathy.  No focal disc abnormalities, spinal canal stenosis, neural foraminal narrowing.     L2-L3: Mild bilateral facet arthropathy.  No focal disc abnormalities, spinal canal stenosis, neural foraminal narrowing.     L3-L4: Mild bilateral facet arthropathy and circumferential disc bulge.  No significant spinal canal stenosis or neural foraminal narrowing.     L4-L5: Mild bilateral facet arthropathy and circumferential disc bulge.  No significant spinal canal stenosis or neural foraminal narrowing.     L5-S1: Mild bilateral facet arthropathy.  No focal disc abnormalities, spinal canal stenosis, neural foraminal narrowing.     Paraspinal muscles & soft tissues: Unremarkable.     Impression:     Mild multilevel degenerative changes of the lumbar spine as detailed level by level unchanged when compared to MRI 08/12/2019.     Electronically signed by resident: Mik Roberts  Date:                                            01/25/2021  Time:                                           12:31     Electronically signed by: Haroldo Bro MD  Date:                                            01/25/2021  Time:                                           12:40    7/24/19 MRI Lumbar  FINDINGS:  Alignment: Normal.    Vertebrae: L1 vertebral body hemangioma.  No diffuse marrow replacement process.  No fracture.    Discs: Disc desiccation and mild height loss at L3-4 and L4-5.    Cord: Normal.  Conus terminates at L1.    Degenerative findings:    T12-L1: No spinal  canal stenosis or neural foraminal narrowing.    L1-L2: No spinal canal stenosis or neural foraminal narrowing.    L2-L3: No spinal canal stenosis or neural foraminal narrowing.    L3-L4: Posterior disc bulge and mild facet arthropathy without spinal canal stenosis or neural foraminal narrowing.    L4-L5: Annular fissure with posterior disc bulge and mild facet arthropathy.  No spinal canal stenosis or neural foraminal narrowing.    L5-S1: Mild facet arthropathy without spinal canal stenosis or neural narrowing.    Paraspinal muscles & soft tissues: Unremarkable.      Impression       1. Mild lumbar spondylosis with annular fissure at L4-5.     Pain Disability Index Review:  Last 3 PDI Scores 4/8/2021 7/16/2020 10/28/2019   Pain Disability Index (PDI) 15 23 22         Allergies:   Review of patient's allergies indicates:   Allergen Reactions    Penicillins Hives       Current Medications:   Current Outpatient Medications   Medication Sig Dispense Refill    diclofenac sodium (VOLTAREN) 1 % Gel Apply 2 g topically 4 (four) times daily. 1 each 2    ibuprofen (ADVIL,MOTRIN) 600 MG tablet TK 1 T PO TID      metaxalone (SKELAXIN) 800 MG tablet Take 1 tablet (800 mg total) by mouth 3 (three) times daily as needed for Pain. 270 tablet 2    naproxen (NAPROSYN) 500 MG tablet Take 1 tablet (500 mg total) by mouth 2 (two) times daily with meals. 60 tablet 2     No current facility-administered medications for this visit.       REVIEW OF SYSTEMS:    GENERAL:  No weight loss, malaise or fevers.  HEENT:  Negative for frequent or significant headaches.  NECK:  Negative for lumps, goiter, pain and significant neck swelling.  RESPIRATORY:  Negative for cough, wheezing or shortness of breath.  CARDIOVASCULAR:  Negative for chest pain, leg swelling or palpitations.  GI:  Negative for abdominal discomfort, blood in stools or black stools or change in bowel habits.  MUSCULOSKELETAL:  See HPI.  SKIN:  Negative for lesions, rash, and  itching.  PSYCH:  +ve for sleep disturbance, mood disorder and recent psychosocial stressors.  HEMATOLOGY/LYMPHOLOGY:  Negative for prolonged bleeding, bruising easily or swollen nodes.  NEURO:   No history of headaches, syncope, paralysis, seizures or tremors.  All other reviewed and negative other than HPI.    Past Medical History:  Past Medical History:   Diagnosis Date    DJD (degenerative joint disease), lumbar        Past Surgical History:  Past Surgical History:   Procedure Laterality Date    EPIDURAL STEROID INJECTION N/A 3/24/2021    Procedure: INJECTION, STEROID, EPIDURAL, IL L4-5;  Surgeon: Jake Felder MD;  Location: [facility] PAIN T;  Service: Pain Management;  Laterality: N/A;  CONSENT NEEDED    INJECTION OF ANESTHETIC AGENT AROUND NERVE Right 9/4/2019    Procedure: BLOCK, NERVE, L2,L3,L4,L5 MEDIAL BRANCH;  Surgeon: Jake Felder MD;  Location: [facility] PAIN T;  Service: Pain Management;  Laterality: Right;    RADIOFREQUENCY ABLATION Right 10/2/2019    Procedure: RADIOFREQUENCY ABLATION, RIGHT L2-L3-L4-L5 MEDIAL BRANCH;  Surgeon: Jake Felder MD;  [place] [facility] [place] [facility] [place]  Service: Pain Management;  Laterality: Right;       Family History:  Family History   Problem Relation Age of Onset    Coronary artery disease Father        Social History:  Social History     Socioeconomic History    Marital status:    Tobacco Use    Smoking status: Never Smoker    Smokeless tobacco: Never Used   Substance and Sexual Activity    Alcohol use: Yes     Comment: socially    Drug use: No       OBJECTIVE:    There were no vitals taken for this visit.    PHYSICAL EXAMINATION:    General appearance: Well appearing, in no acute distress, alert and oriented x3.  Psych:  Mood and affect appropriate.  Back: Straight leg raising in the sitting and supine positions is negative to radicular pain.mild pain to palpation over the spine or costovertebral angles. Normal range of motion without pain  reproduction.Positive axial loading test bilateral. Positive tenderness over rt SIJ, Positive FABERE,Ganselin and Yeoman's test on the rt side.negative FADIR  Extremities: Peripheral joint ROM is full and pain free without obvious instability or laxity in all four extremities. No deformities, edema, or skin discoloration. Good capillary refill.  Musculoskeletal: Shoulder, hip, sacroiliac and knee provocative maneuvers are negative. Bilateral upper and lower extremity strength is normal and symmetric.  No atrophy or tone abnormalities are noted.Mild tenderness on palpation on right SI joint  And GTB area.  Neuro: Bilateral upper and lower extremity coordination and muscle stretch reflexes are physiologic and symmetric.  Plantar response are downgoing. No loss of sensation is noted.  Gait: Normal.    ASSESSMENT: 50 y.o. year old male with right sided lower back pain, consistent with the following diagnoses:    1. Sacroiliitis  tiZANidine (ZANAFLEX) 4 MG tablet    Procedure Order to Pain Management   2. Greater trochanteric bursitis, unspecified laterality  tiZANidine (ZANAFLEX) 4 MG tablet    Procedure Order to Pain Management   3. Spondylosis of lumbar region without myelopathy or radiculopathy  tiZANidine (ZANAFLEX) 4 MG tablet   4. Osteoarthritis of spine, unspecified spinal osteoarthritis complication status, unspecified spinal region  tiZANidine (ZANAFLEX) 4 MG tablet       PLAN:     - I have stressed the importance of physical activity and a home exercise plan to help with pain and improve health.    - Patient can continue with medications for now since they are providing benefits, using them appropriately, and without side effects.    - Prior records reviewed    - D/C  Skelaxin .will start him on Zanaflex 4mg po QHS Prn for muscle spasms    - Continue Naproxen    - Continue Voltaren gel     -Will schedule him for Right SI joint injection.    - if pain didn't improve with SI joint injection will consider re-read  the MRI of lumbar spine    - RTC 4 weeks after the injection    - Counseled patient regarding the importance of activity modification, constant sleeping habits and physical therapy.    The above plan and management options were discussed at length with patient. Patient is in agreement with the above and verbalized understanding.    Uzair Dyson I have personally reviewed the history and exam of this patient and agree with the resident/fellow/NPs note as stated above.    Jake Felder MD    02/03/2022

## 2022-02-03 NOTE — PROGRESS NOTES
Chronic patient Established Note (Follow up visit)      SUBJECTIVE:    Interval History 2/3/2022:  Patient is here for Chronic LBP . His pain was exacerbated since last week after he played tennin. Pain is located below the belt line and on the right side.Its non-radiating and increase with standing,walking and prolong sitting. He had RFA of lumbar MBB and ARGENIS in the past past with somewhat relief of his pain.he is taking naproxen with some relief and a muscle relaxant which is not helping him .he is s/p L4-5 ILESI with mild benefit    - He did find PT helpful but did not feel it was overly resolving pain and therefore stopped    - He has had RFAs in past with benefit  does not feel like this is same pain.       -The patient denies bowel and bladder incontinence, saddle anesthesia, and new neurologic symptoms.    Interval History 4/8/2021:  The patient presents for follow up of lower back pain and s/p L4/5 ILESI. States he is improved but not where he wants to be. States approx 20% benefit. Pt has not continued PT, he did have benefit but again not where he would have liked to been. He states he knows his triggers and avoids these such as sitting, Denies new areas of pain or neurological changes.     Interval History 12/9/2020:  The patient presents for evaluation of R sided lower back pain and adamant his hip is etiology. He has no internal hip pain, pain is focal to lower R lumbar and into R buttock into lateral hip. Pain is worse with sitting and stretching, taking skelaxin, naproxen and voltaren with benefit. The patient denies myelopathic symptoms such as handwriting changes or difficulty with buttons/coins/keys. Denies perineal paresthesias, bowel/bladder dysfunction.      Interval History 7/16/2020:  Pt presents for evaluation and TPI today prior to leaving to daughters wedding tomorrow. When he returns he will be re-starting healthy back and will re-evaluate to consider repeat RFA if needed. The patient  denies myelopathic symptoms such as handwriting changes or difficulty with buttons/coins/keys. Denies perineal paresthesias, bowel/bladder dysfunction.      Interval History 7/14/2020:  The patient presents for follow-up of lower back pain.  Patient has had gradual increase of right lower back pain over past several weeks.  He has had prior RFA to left than right lower lumbar lasting for over 9 months.  The right side is primary generator.  He is taking Skelaxin, naproxen and will be going on a trip for daughter's wedding this Friday.  He voices concern as he is having to sit for long periods in plane. The patient denies myelopathic symptoms such as handwriting changes or difficulty with buttons/coins/keys. Denies perineal paresthesias, bowel/bladder dysfunction. He will be restarting healthy back after trip. Rates pain 5/10.       Interval History 10/28/2019:  The patient is here for follow up of right lower back pain.  He is now s/p right L2,3,4,5 RFA.  He is reporting about 60% relief.  He still has some intermittent muscle spasms on the right.  He has some benefit with Skelaxin.  He previously had benefit with [facility] and would like to repeat PT.  His pain today is 5/10.    Interval History 9/16/2019:  The patient returns for follow up of right sided back pain.  He is s/p right L2,3,4,5 MBB with 80% relief.  He also underwent MBB in 2015 with 80% relief.  He is interested in RFA.  He has an aching pain across the lower back, mainly on the right.  It worsens with sitting and standing.  He has some benefit with Naproxen.  He has intermittent muscle spasms as well.  He has tried stretching and PT exercises for months without benefit.  He denies any radiation of his pain or numbness.  His pain today is 3/10.    Initial encounter:    Jason Dutton presents to the clinic for the evaluation of right sided lower back pain. The pain started about 12 years ago following no inciting event and symptoms have been  "worsening. Patient was last seen in clinic over 3 years ago. Recently he was seen in [facility] and was prescribed a medrol dose pack but he never took it because his "flare up" had subsided. He always has some baseline pain that has increased recently. But during flare ups the pain becomes severe and even walking becomes difficult. 3 years ago he had BL lumbar MBB that provided him long-lasting relief so he never followed through with the RFA.    Brief history:    Pain Description:    The pain is located in the right lower back area without radiation.      At BEST  4/10     At WORST  9/10 on the WORST day.      On average pain is rated as 7/10.     Today the pain is rated as 8/10    The pain is described as aching, dull and throbbing      Symptoms interfere with daily activity, sleeping and work.     Exacerbating factors: Bending and Lifting.      Mitigating factors ice and medications.     Patient denies night fever/night sweats, urinary incontinence, bowel incontinence, significant weight loss and significant motor weakness.  Patient denies any suicidal or homicidal ideations    Pain Medications:  Current:  Motrin  Alleve    Tried in Past:  NSAIDs -Never  TCA -Never  SNRI -Never  Anti-convulsants -Never  Muscle Relaxants -Never  Opioids-Never    Physical Therapy/Home Exercise: yes       report:  Not applicable    Pain Procedures:   2015 Bilateral L2,3,4,5 MBB- 80% relief  19 Right L2,3,4,5 MBB- 80% relief  10/2/19 Right L2,3,4,5 RFA- 80% relief  3/24/2021 L4/5 ILESI approx 20% improved    Chiropractor -never  Acupuncture - never  TENS unit -never  Spinal decompression -never  Joint replacement -never    Imagin/19/21  EXAMINATION:  MRI LUMBAR SPINE WITHOUT CONTRAST     CLINICAL HISTORY:  Back pain or radiculopathy, > 6 wks; Dorsalgia, unspecified     TECHNIQUE:  Multiplanar, multisequence MR images were acquired from the thoracolumbar junction to the sacrum without the " administration of contrast.     COMPARISON:  MRI 08/12/2019     FINDINGS:  Alignment: Normal.     Vertebrae:  L1 vertebral body hemangioma.  No diffuse marrow signal abnormality.  No acute fractures.     Discs: Disc desiccation of L3-4 and L4-5.  Annular fissure of L4-5.     Cord: Normal.  Conus terminates at L1     Degenerative findings:     T12-L1: No focal disc abnormalities, spinal canal stenosis, neural foraminal narrowing.     L1-L2: Mild bilateral facet arthropathy.  No focal disc abnormalities, spinal canal stenosis, neural foraminal narrowing.     L2-L3: Mild bilateral facet arthropathy.  No focal disc abnormalities, spinal canal stenosis, neural foraminal narrowing.     L3-L4: Mild bilateral facet arthropathy and circumferential disc bulge.  No significant spinal canal stenosis or neural foraminal narrowing.     L4-L5: Mild bilateral facet arthropathy and circumferential disc bulge.  No significant spinal canal stenosis or neural foraminal narrowing.     L5-S1: Mild bilateral facet arthropathy.  No focal disc abnormalities, spinal canal stenosis, neural foraminal narrowing.     Paraspinal muscles & soft tissues: Unremarkable.     Impression:     Mild multilevel degenerative changes of the lumbar spine as detailed level by level unchanged when compared to MRI 08/12/2019.     Electronically signed by resident: Mik Roberts  Date:                                            01/25/2021  Time:                                           12:31     Electronically signed by: Haroldo Bro MD  Date:                                            01/25/2021  Time:                                           12:40    7/24/19 MRI Lumbar  FINDINGS:  Alignment: Normal.    Vertebrae: L1 vertebral body hemangioma.  No diffuse marrow replacement process.  No fracture.    Discs: Disc desiccation and mild height loss at L3-4 and L4-5.    Cord: Normal.  Conus terminates at L1.    Degenerative findings:    T12-L1: No spinal  canal stenosis or neural foraminal narrowing.    L1-L2: No spinal canal stenosis or neural foraminal narrowing.    L2-L3: No spinal canal stenosis or neural foraminal narrowing.    L3-L4: Posterior disc bulge and mild facet arthropathy without spinal canal stenosis or neural foraminal narrowing.    L4-L5: Annular fissure with posterior disc bulge and mild facet arthropathy.  No spinal canal stenosis or neural foraminal narrowing.    L5-S1: Mild facet arthropathy without spinal canal stenosis or neural narrowing.    Paraspinal muscles & soft tissues: Unremarkable.      Impression       1. Mild lumbar spondylosis with annular fissure at L4-5.     Pain Disability Index Review:  Last 3 PDI Scores 4/8/2021 7/16/2020 10/28/2019   Pain Disability Index (PDI) 15 23 22         Allergies:   Review of patient's allergies indicates:   Allergen Reactions    Penicillins Hives       Current Medications:   Current Outpatient Medications   Medication Sig Dispense Refill    diclofenac sodium (VOLTAREN) 1 % Gel Apply 2 g topically 4 (four) times daily. 1 each 2    ibuprofen (ADVIL,MOTRIN) 600 MG tablet TK 1 T PO TID      metaxalone (SKELAXIN) 800 MG tablet Take 1 tablet (800 mg total) by mouth 3 (three) times daily as needed for Pain. 270 tablet 2    naproxen (NAPROSYN) 500 MG tablet Take 1 tablet (500 mg total) by mouth 2 (two) times daily with meals. 60 tablet 2     No current facility-administered medications for this visit.       REVIEW OF SYSTEMS:    GENERAL:  No weight loss, malaise or fevers.  HEENT:  Negative for frequent or significant headaches.  NECK:  Negative for lumps, goiter, pain and significant neck swelling.  RESPIRATORY:  Negative for cough, wheezing or shortness of breath.  CARDIOVASCULAR:  Negative for chest pain, leg swelling or palpitations.  GI:  Negative for abdominal discomfort, blood in stools or black stools or change in bowel habits.  MUSCULOSKELETAL:  See HPI.  SKIN:  Negative for lesions, rash, and  itching.  PSYCH:  +ve for sleep disturbance, mood disorder and recent psychosocial stressors.  HEMATOLOGY/LYMPHOLOGY:  Negative for prolonged bleeding, bruising easily or swollen nodes.  NEURO:   No history of headaches, syncope, paralysis, seizures or tremors.  All other reviewed and negative other than HPI.    Past Medical History:  Past Medical History:   Diagnosis Date    DJD (degenerative joint disease), lumbar        Past Surgical History:  Past Surgical History:   Procedure Laterality Date    EPIDURAL STEROID INJECTION N/A 3/24/2021    Procedure: INJECTION, STEROID, EPIDURAL, IL L4-5;  Surgeon: Jake Felder MD;  Location: [facility] T;  Service: Pain Management;  Laterality: N/A;  CONSENT NEEDED    INJECTION OF ANESTHETIC AGENT AROUND NERVE Right 9/4/2019    Procedure: BLOCK, NERVE, L2,L3,L4,L5 MEDIAL BRANCH;  Surgeon: Jake Felder MD;  Location: [facility] PAIN T;  Service: Pain Management;  Laterality: Right;    RADIOFREQUENCY ABLATION Right 10/2/2019    Procedure: RADIOFREQUENCY ABLATION, RIGHT L2-L3-L4-L5 MEDIAL BRANCH;  Surgeon: Jake Felder MD;  Location: [facility] PA[facility];  Service: Pain Management;  Laterality: Right;       Family History:  Family History   Problem Relation Age of Onset    Coronary artery disease Father        Social History:  Social History     Socioeconomic History    Marital status:    Tobacco Use    Smoking status: Never Smoker    Smokeless tobacco: Never Used   Substance and Sexual Activity    Alcohol use: Yes     Comment: socially    Drug use: No       OBJECTIVE:    There were no vitals taken for this visit.    PHYSICAL EXAMINATION:    General appearance: Well appearing, in no acute distress, alert and oriented x3.  Psych:  Mood and affect appropriate.  Back: Straight leg raising in the sitting and supine positions is negative to radicular pain.mild pain to palpation over the spine or costovertebral angles. Normal range of motion without pain  reproduction.Positive axial loading test bilateral. Positive tenderness over rt SIJ, Positive FABERE,Ganselin and Yeoman's test on the rt side.negative FADIR  Extremities: Peripheral joint ROM is full and pain free without obvious instability or laxity in all four extremities. No deformities, edema, or skin discoloration. Good capillary refill.  Musculoskeletal: Shoulder, hip, sacroiliac and knee provocative maneuvers are negative. Bilateral upper and lower extremity strength is normal and symmetric.  No atrophy or tone abnormalities are noted.Mild tenderness on palpation on right SI joint  And GTB area.  Neuro: Bilateral upper and lower extremity coordination and muscle stretch reflexes are physiologic and symmetric.  Plantar response are downgoing. No loss of sensation is noted.  Gait: Normal.    ASSESSMENT: 50 y.o. year old male with right sided lower back pain, consistent with the following diagnoses:    1. Sacroiliitis  tiZANidine (ZANAFLEX) 4 MG tablet    Procedure Order to Pain Management   2. Greater trochanteric bursitis, unspecified laterality  tiZANidine (ZANAFLEX) 4 MG tablet    Procedure Order to Pain Management   3. Spondylosis of lumbar region without myelopathy or radiculopathy  tiZANidine (ZANAFLEX) 4 MG tablet   4. Osteoarthritis of spine, unspecified spinal osteoarthritis complication status, unspecified spinal region  tiZANidine (ZANAFLEX) 4 MG tablet       PLAN:     - I have stressed the importance of physical activity and a home exercise plan to help with pain and improve health.    - Patient can continue with medications for now since they are providing benefits, using them appropriately, and without side effects.    - Prior records reviewed    - D/C  Skelaxin .will start him on Zanaflex 4mg po QHS Prn for muscle spasms    - Continue Naproxen    - Continue Voltaren gel     -Will schedule him for Right SI joint injection.    - if pain didn't improve with SI joint injection will consider re-read  the MRI of lumbar spine    - RTC 4 weeks after the injection    - Counseled patient regarding the importance of activity modification, constant sleeping habits and physical therapy.    The above plan and management options were discussed at length with patient. Patient is in agreement with the above and verbalized understanding.    Uzair Dyson I have personally reviewed the history and exam of this patient and agree with the resident/fellow/NPs note as stated above.    Jake Felder MD    02/03/2022

## 2022-02-09 ENCOUNTER — HOSPITAL ENCOUNTER (OUTPATIENT)
Facility: OTHER | Age: 52
Discharge: HOME | End: 2022-02-09
Attending: ANESTHESIOLOGY | Admitting: ANESTHESIOLOGY
Payer: COMMERCIAL

## 2022-02-09 VITALS
SYSTOLIC BLOOD PRESSURE: 167 MMHG | OXYGEN SATURATION: 96 % | HEART RATE: 62 BPM | DIASTOLIC BLOOD PRESSURE: 84 MMHG | RESPIRATION RATE: 16 BRPM | WEIGHT: 240 LBS | TEMPERATURE: 98 F | BODY MASS INDEX: 29.22 KG/M2 | HEIGHT: 76 IN

## 2022-02-09 DIAGNOSIS — M46.1 SACROILIITIS: Primary | ICD-10-CM

## 2022-02-09 DIAGNOSIS — G89.29 CHRONIC PAIN: ICD-10-CM

## 2022-02-09 PROCEDURE — 27096 INJECT SACROILIAC JOINT: CPT | Mod: RT | Performed by: ANESTHESIOLOGY

## 2022-02-09 PROCEDURE — 63600175 PHARM REV CODE 636 W HCPCS: Performed by: ANESTHESIOLOGY

## 2022-02-09 PROCEDURE — 20610 DRAIN/INJ JOINT/BURSA W/O US: CPT | Mod: RT,59 | Performed by: ANESTHESIOLOGY

## 2022-02-09 PROCEDURE — 25000003 PHARM REV CODE 250: Performed by: ANESTHESIOLOGY

## 2022-02-09 PROCEDURE — 27096 PR INJECTION,SACROILIAC JOINT: ICD-10-PCS | Mod: RT,,, | Performed by: ANESTHESIOLOGY

## 2022-02-09 PROCEDURE — 27096 INJECT SACROILIAC JOINT: CPT | Mod: RT,,, | Performed by: ANESTHESIOLOGY

## 2022-02-09 PROCEDURE — 25500020 PHARM REV CODE 255: Performed by: ANESTHESIOLOGY

## 2022-02-09 RX ORDER — LIDOCAINE HYDROCHLORIDE 20 MG/ML
INJECTION, SOLUTION INFILTRATION; PERINEURAL
Status: DISCONTINUED | OUTPATIENT
Start: 2022-02-09 | End: 2022-02-09 | Stop reason: HOSPADM

## 2022-02-09 RX ORDER — DEXAMETHASONE SODIUM PHOSPHATE 10 MG/ML
INJECTION INTRAMUSCULAR; INTRAVENOUS
Status: DISCONTINUED | OUTPATIENT
Start: 2022-02-09 | End: 2022-02-09 | Stop reason: HOSPADM

## 2022-02-09 RX ORDER — BUPIVACAINE HYDROCHLORIDE 2.5 MG/ML
INJECTION, SOLUTION EPIDURAL; INFILTRATION; INTRACAUDAL
Status: DISCONTINUED | OUTPATIENT
Start: 2022-02-09 | End: 2022-02-09 | Stop reason: HOSPADM

## 2022-02-09 RX ORDER — TRIAMCINOLONE ACETONIDE 40 MG/ML
INJECTION, SUSPENSION INTRA-ARTICULAR; INTRAMUSCULAR
Status: DISCONTINUED | OUTPATIENT
Start: 2022-02-09 | End: 2022-02-09 | Stop reason: HOSPADM

## 2022-02-09 RX ORDER — SODIUM CHLORIDE 9 MG/ML
INJECTION, SOLUTION INTRAVENOUS CONTINUOUS
Status: DISCONTINUED | OUTPATIENT
Start: 2022-02-09 | End: 2022-02-09 | Stop reason: HOSPADM

## 2022-02-09 NOTE — DISCHARGE INSTRUCTIONS

## 2022-02-09 NOTE — OP NOTE
Sacroiliac Joint Injection under Fluoroscopic Guidance    The procedure, risks, benefits, and options were discussed with the patient. There are no contraindications to the procedure. The patent expressed understanding and agreed to the procedure. Informed written consent was obtained prior to the start of the procedure and can be found in the patient's chart.    PATIENT NAME: Jason Dutton   MRN: [id number]     DATE OF PROCEDURE: 02/09/2022    PROCEDURE: Right Sacroiliac Joint Injection under Fluoroscopic Guidance    PRE-OP DIAGNOSIS: Spondylosis of lumbar region without myelopathy or radiculopathy [M47.816]  Sacroiliac joint pain [M53.3]  Greater trochanteric bursitis     POST-OP DIAGNOSIS: Same    PHYSICIAN: Jake Felder MD      MEDICATIONS INJECTED: Preservative-free Kenalog 40mg with 9cc of Bupivacine 0.25%     LOCAL ANESTHETIC INJECTED: Xylocaine 2%     SEDATION: None    ESTIMATED BLOOD LOSS: None    COMPLICATIONS: None    TECHNIQUE: Time-out was performed to identify the patient and procedure to be performed. With the patient laying in a prone position, the surgical area was prepped and draped in the usual sterile fashion using ChloraPrep and a fenestrated drape. The sacroiliac joint was determined under fluoroscopy guidance. Skin anesthesia was achieved by injecting Lidocaine 2% over the injection site. The sacroiliac joint was  then approached with a 22 gauge, 3.5 inch spinal quinke needle that was introduced under fluoroscopic guidance in the AP and Lateral views. Once the needle tip was in the area of the joint, and there was no blood, contrast dye Omnipaque (300mg/ml) was injected to confirm placement and there was no vascular runoff. Fluoroscopic imaging in the AP and lateral views revealed a clear outline of the joint space. 5 mL of the medication mixture listed above was injected slowly intraarticular and himanshu-articular. Displacement of the radio opaque contrast after injection of the medication  confirmed that the medication went into the area of the joint. The needles were removed and bleeding was nil. A sterile dressing was applied. No specimens collected. The patient tolerated the procedure well.         Greater Trochanteric Bursa Injection under Fluoroscopic Guidance    The procedure, risks, benefits, and options were discussed with the patient. There are no contraindications to the procedure. The patent expressed understanding and agreed to the procedure. Informed written consent was obtained prior to the start of the procedure and can be found in the patient's chart.      PROCEDURE: Right Greater Trochanteric Bursa Injection under Fluoroscopic Guidance      TECHNIQUE: Time-out was performed to identify the patient and procedure to be performed. With the patient laying in a prone position, the surgical area was prepped and draped in the usual sterile fashion using ChloraPrep and a fenestrated drape. The area overlying the greater trochanteric bursa was determined under fluoroscopy guidance. Skin anesthesia was achieved by injecting Lidocaine 2% over the injection site. The greater trochanteric bursa was then approached with a 22 gauge, 5 inch spinal quinke needle that was introduced under fluoroscopic guidance in the AP view. Once the needle tip was in the area of the bursa, and there was no blood aspiration, contrast dye Omnipaque (300mg/ml) was injected to confirm placement and there was no vascular runoff. 5 mL of the medication mixture listed above was injected slowly. The needles were removed and bleeding was nil. A sterile dressing was applied. No specimens collected. The patient tolerated the procedure well.    The patient was monitored after the procedure in the recovery area. They were given post-procedure and discharge instructions to follow at home. The patient was discharged in a stable condition.    I reviewed and edited the fellow's note. I conducted my own interview and physical  examination. I agree with the findings. I was present and supervising all critical portions of the procedure.    Jake Felder MD

## 2022-02-09 NOTE — DISCHARGE SUMMARY
Discharge Note  Short Stay      SUMMARY     Admit Date: 2/9/2022    Attending Physician: Jake Felder      Discharge Physician: Jake Felder      Discharge Date: 2/9/2022 4:03 PM    Procedure(s) (LRB):  INJECTION, JOINT, SI RIGHT (Right)  INJECTION, GREATER TROCHANTERIC BURSA, RIGHT NEEDS CONSENT (Right)    Final Diagnosis: Spondylosis of lumbar region without myelopathy or radiculopathy [M47.816]  Sacroiliac joint pain [M53.3]    Disposition: Home or self care    Patient Instructions:   Discharge Medication List as of 2/9/2022  2:15 PM      CONTINUE these medications which have NOT CHANGED    Details   diclofenac sodium (VOLTAREN) 1 % Gel Apply 2 g topically 4 (four) times daily., Starting Tue 12/14/2021, Normal      ibuprofen (ADVIL,MOTRIN) 600 MG tablet TK 1 T PO TID, Historical Med      metaxalone (SKELAXIN) 800 MG tablet Take 1 tablet (800 mg total) by mouth 3 (three) times daily as needed for Pain., Starting Tue 12/14/2021, Normal      naproxen (NAPROSYN) 500 MG tablet Take 1 tablet (500 mg total) by mouth 2 (two) times daily with meals., Starting Mon 12/28/2020, Normal      tiZANidine (ZANAFLEX) 4 MG tablet Take 1 tablet (4 mg total) by mouth after dinner., Starting Thu 2/3/2022, Until Sat 3/5/2022, Normal                 Discharge Diagnosis: Spondylosis of lumbar region without myelopathy or radiculopathy [M47.816]  Sacroiliac joint pain [M53.3]  Condition on Discharge: Stable with no complications to procedure   Diet on Discharge: Same as before.  Activity: as per instruction sheet.  Discharge to: Home with a responsible adult.  Follow up: 2-4 weeks       Please call my office or pager at [phone] if experienced any weakness or loss of sensation, fever > 101.5, pain uncontrolled with oral medications, persistent nausea/vomiting/or diarrhea, redness or drainage from the incisions, or any other worrisome concerns. If physician on call was not reached or could not communicate with our office for  any reason please go to the nearest emergency department        Jake Felder MD

## 2022-03-16 ENCOUNTER — PATIENT MESSAGE (OUTPATIENT)
Dept: ADMINISTRATIVE | Facility: HOSPITAL | Age: 52
End: 2022-03-16
Payer: COMMERCIAL

## 2022-12-19 ENCOUNTER — OFFICE VISIT (OUTPATIENT)
Dept: PODIATRY | Facility: CLINIC | Age: 52
End: 2022-12-19
Payer: COMMERCIAL

## 2022-12-19 VITALS — BODY MASS INDEX: 31.07 KG/M2 | HEIGHT: 76 IN | WEIGHT: 255.13 LBS

## 2022-12-19 DIAGNOSIS — M21.862 ACQUIRED POSTERIOR EQUINUS OF BOTH LOWER EXTREMITIES: ICD-10-CM

## 2022-12-19 DIAGNOSIS — M21.861 ACQUIRED POSTERIOR EQUINUS OF BOTH LOWER EXTREMITIES: ICD-10-CM

## 2022-12-19 DIAGNOSIS — M72.2 PLANTAR FASCIITIS, BILATERAL: Primary | ICD-10-CM

## 2022-12-19 PROCEDURE — 1159F PR MEDICATION LIST DOCUMENTED IN MEDICAL RECORD: ICD-10-PCS | Mod: CPTII,S$GLB,, | Performed by: PODIATRIST

## 2022-12-19 PROCEDURE — 20550 PR INJECT TENDON SHEATH/LIGAMENT: ICD-10-PCS | Mod: 59,S$GLB,, | Performed by: PODIATRIST

## 2022-12-19 PROCEDURE — 20550 NJX 1 TENDON SHEATH/LIGAMENT: CPT | Mod: 59,S$GLB,, | Performed by: PODIATRIST

## 2022-12-19 PROCEDURE — 99203 PR OFFICE/OUTPT VISIT, NEW, LEVL III, 30-44 MIN: ICD-10-PCS | Mod: 25,S$GLB,, | Performed by: PODIATRIST

## 2022-12-19 PROCEDURE — 3008F PR BODY MASS INDEX (BMI) DOCUMENTED: ICD-10-PCS | Mod: CPTII,S$GLB,, | Performed by: PODIATRIST

## 2022-12-19 PROCEDURE — 1160F PR REVIEW ALL MEDS BY PRESCRIBER/CLIN PHARMACIST DOCUMENTED: ICD-10-PCS | Mod: CPTII,S$GLB,, | Performed by: PODIATRIST

## 2022-12-19 PROCEDURE — 99999 PR PBB SHADOW E&M-EST. PATIENT-LVL III: ICD-10-PCS | Mod: PBBFAC,,, | Performed by: PODIATRIST

## 2022-12-19 PROCEDURE — 99999 PR PBB SHADOW E&M-EST. PATIENT-LVL III: CPT | Mod: PBBFAC,,, | Performed by: PODIATRIST

## 2022-12-19 PROCEDURE — 1159F MED LIST DOCD IN RCRD: CPT | Mod: CPTII,S$GLB,, | Performed by: PODIATRIST

## 2022-12-19 PROCEDURE — 99203 OFFICE O/P NEW LOW 30 MIN: CPT | Mod: 25,S$GLB,, | Performed by: PODIATRIST

## 2022-12-19 PROCEDURE — 3008F BODY MASS INDEX DOCD: CPT | Mod: CPTII,S$GLB,, | Performed by: PODIATRIST

## 2022-12-19 PROCEDURE — 1160F RVW MEDS BY RX/DR IN RCRD: CPT | Mod: CPTII,S$GLB,, | Performed by: PODIATRIST

## 2022-12-19 RX ADMIN — DEXAMETHASONE SODIUM PHOSPHATE 4 MG: 4 INJECTION, SOLUTION INTRA-ARTICULAR; INTRALESIONAL; INTRAMUSCULAR; INTRAVENOUS; SOFT TISSUE at 02:12

## 2022-12-19 NOTE — PATIENT INSTRUCTIONS
Heel Pain (Plantar Fasciitis)        Heel pain is most often caused by plantar fasciitis, a condition that is sometimes also called heel spur syndrome when a spur is present. Heel pain may also be due to other causes, such as a stress fracture, tendonitis, arthritis, nerve irritation or, rarely, a cyst.    Because there are several potential causes, it is important to have heel pain properly diagnosed. A foot and ankle surgeon is able to distinguish between all the possibilities and to determine the underlying source of your heel pain.    What Is Plantar Fasciitis?  Heel pain is often caused by plantar fasciitis  Plantar fasciitis is an inflammation of the band of tissue (the plantar fascia) that extends from the heel to the toes. In this condition, the fascia first becomes irritated and then inflamed, resulting in heel pain.    Causes  The most common cause of plantar fasciitis relates to faulty structure of the foot. For example, people who have problems with their arches, either overly flat feet or high-arched feet, are more prone to developing plantar fasciitis.    Wearing nonsupportive footwear on hard, flat surfaces puts abnormal strain on the plantar fascia and can also lead to plantar fasciitis. This is particularly evident when ones job requires long hours on the feet. Obesity and overuse may also contribute to plantar fasciitis.    Symptoms  The symptoms of plantar fasciitis are:    Pain on the bottom of the heel  Pain in the arch of the foot  Pain that is usually worse upon arising  Pain that increases over a period of months  Swelling on the bottom of the heel     People with plantar fasciitis often describe the pain as worse when they get up in the morning or after they have been sitting for long periods of time. After a few minutes of walking, the pain decreases because walking stretches the fascia. For some people, the pain subsides but returns after spending long periods of time on their  feet.    Diagnosis  To arrive at a diagnosis, the foot and ankle surgeon will obtain your medical history and examine your foot. Throughout this process, the surgeon rules out all possible causes for your heel pain other than plantar fasciitis.    In addition, diagnostic imaging studies, such as x-rays or other imaging modalities, may be used to distinguish the different types of heel pain. Sometimes heel spurs are found in patients with plantar fasciitis, but these are rarely a source of pain. When they are present, the condition may be diagnosed as plantar fasciitis/heel spur syndrome.    Nonsurgical Treatment  Treatment of plantar fasciitis begins with first-line strategies, which you can begin at home:    -Stretching exercises. Exercises that stretch out the calf muscles help ease pain and assist with recovery.  -Avoid going barefoot. When you walk without shoes, you put undue strain and stress on your plantar fascia.  -Ice. Putting an ice pack on your heel for 20 minutes several times a day helps reduce inflammation. Place a thin towel between the ice and your heel; do not apply ice directly to the skin.  -Limit activities. Cut down on extended physical activities to give your heel a rest.  -Shoe modifications. Wearing supportive shoes that have good arch support and a slightly raised heel reduces stress on the plantar fascia.  -Medications. Oral nonsteroidal anti-inflammatory drugs (NSAIDs), such as ibuprofen, may be recommended to reduce pain and inflammation.     If you still have pain after several weeks, see your foot and ankle surgeon, who may add one or more of these treatment approaches:    -Padding, taping and strapping. Placing pads in the shoe softens the impact of walking. Taping and strapping help support the foot and reduce strain on the fascia.  -Orthotic devices. Custom orthotic devices that fit into your shoe help correct the underlying structural abnormalities causing the plantar  fasciitis.  -Injection therapy. In some cases, corticosteroid injections are used to help reduce the inflammation and relieve pain.  -Removable walking cast. A removable walking cast may be used to keep your foot immobile for a few weeks to allow it to rest and heal.  -Night splint. Wearing a night splint allows you to maintain an extended stretch of the plantar fascia while sleeping. This may help reduce the morning pain experienced by some patients.  -Physical therapy. Exercises and other physical therapy measures may be used to help provide relief.     When Is Surgery Needed?  Although most patients with plantar fasciitis respond to nonsurgical treatment, a small percentage of patients may require surgery. If, after several months of nonsurgical treatment, you continue to have heel pain, surgery will be considered. Your foot and ankle surgeon will discuss the surgical options with you and determine which approach would be most beneficial for you.    Long-Term Care  No matter what kind of treatment you undergo for plantar fasciitis, the underlying causes that led to this condition may remain. Therefore, you will need to continue with preventive measures. Wearing supportive shoes, stretching and using custom orthotic devices are the mainstay of long-term treatment for plantar fasciitis.            Understanding Heel Pain  Your heel is the back part of your foot. A band of tissue called the plantar fascia connects the heel bone to the bones in the ball of your foot. Nerves run from the heel up the inside of your ankle and into your leg. When you feel pain in the bottom of your heel, the plantar fascia may be inflamed. Overuse, Achilles tightness, or excess body weight can cause the tissue to tear or pull away from the bone. Sometimes the inflamed plantar fascia also irritates a nerve, causing more pain.    What causes heel pain?  Wearing shoes with poor cushioning can irritate the tissue in your heel (plantar  fascia). Being overweight or standing for long periods can also irritate the tissue. Running, walking, tennis, and other sports that put stress on the heels can cause tiny tears in the tissue. If your lower leg muscles are tight, this is more likely to occur. A tight Achilles tendon will also contribute to heel pain.  Symptoms  You may feel pain on the bottom or on the inside edge of your heel. The pain may be sharp when you get out of bed or when you stand up after sitting for a while. You may feel a dull ache in your heel after youve been standing for a long time on a hard surface. Running can also cause a dull ache.  Preventing future problems  To prevent future heel pain, wear shoes with well-cushioned heels. And do exercises prescribed by your healthcare provider to stretch the plantar fascia and the muscles in the lower leg.   Date Last Reviewed: 9/10/2015  © 2955-1770 [employer]. [address] [place] [address]. All rights reserved. This information is not intended as a substitute for professional medical care. Always follow your healthcare professional's instructions.      Treating Plantar Fasciitis  First, your healthcare provider tries to determine the cause of your problem in order to suggest ways to relieve pain. If your pain is due to poor foot mechanics, custom-made shoe inserts (orthoses) may help.    Reduce symptoms  To relieve mild symptoms, try aspirin, ibuprofen, or other medicines as directed. Rubbing ice on the affected area may also help.  To reduce severe pain and swelling, your healthcare provider may prescribe pills or injections or a walking cast in some instances. Physical therapy, such as ultrasound or a daily stretching program, may also be recommended. Surgery is rarely required.  To reduce symptoms caused by poor foot mechanics, your foot may be taped. This supports the arch and temporarily controls movement. Night splints may also help by stretching the  fascia.  Control movement  If taping helps, your healthcare provider may prescribe orthoses. Built from plaster casts of your feet, these inserts control the way your foot moves. As a result, your symptoms should go away.  Reduce overuse  Every time your foot strikes the ground, the plantar fascia is stretched. You can reduce the strain on the plantar fascia and the possibility of overuse by following these suggestions:  Lose any excess weight.  Avoid running on hard or uneven ground.  Use orthoses at all times in your shoes and house slippers.  If surgery is needed  Your healthcare provider may consider surgery if other types of treatment don't control your pain. During surgery, the plantar fascia is partially cut to release tension. As you heal, fibrous tissue fills the space between the heel bone and the plantar fascia.   Date Last Reviewed: 10/14/2015  © 2926-7718 [employer]. [address] [place] [address]. All rights reserved. This information is not intended as a substitute for professional medical care. Always follow your healthcare professional's instructions.      Equinus          What Is Equinus?    Equinus is a condition in which the upward bending motion of the ankle joint is limited. Someone with equinus lacks the flexibility to bring the top of the foot toward the front of the leg. Equinus can occur in one or both feet. When it involves both feet, the limitation of motion is sometimes worse in one foot than in the other.    People with equinus develop ways to compensate for their limited ankle motion, and this often leads to other foot, leg or back problems. The most common methods of compensation are flattening of the arch or picking up the heel early when walking, placing increased pressure on the ball of the foot. Other patients compensate by toe walking, while a smaller number take steps by bending abnormally at the hip or knee.    Causes  There are several possible causes  for the limited range of ankle motion. Often, it is due to tightness in the Achilles tendon or calf muscles (the soleus muscle and/or gastrocnemius muscle). In some patients, this tightness is congenital (present at birth), and sometimes it is an inherited trait. Other patients acquire the tightness from being in a cast, being on crutches or frequently wearing high-heeled shoes. In addition, diabetes can affect the fibers of the Achilles tendon and cause tightness. Sometimes equinus is related to a bone blocking the ankle motion. For example, a fragment of a broken bone following an ankle injury, or bone block, can get in the way and restrict motion. Equinus may also result from one leg being shorter than the other. Less often, equinus is caused by spasms in the calf muscle. These spasms may be signs of an underlying neurologic disorder.      Foot Problems Related to Equinus  Depending on how a patient compensates for the inability to bend properly at the ankle, a variety of foot conditions can develop, including:    Plantar fasciitis (arch/heel pain)  Calf cramping  Tendonitis (inflammation in the Achilles tendon)  Metatarsalgia (pain and/or callusing on the ball of the foot)  Flatfoot  Arthritis of the midfoot (middle area of the foot)  Pressure sores on the ball of the foot or the arch  Bunions and hammertoes  Ankle pain  Shin splints     Diagnosis  Most patients with equinus are unaware they have this condition when they first visit the doctor. Instead, they come to the doctor seeking relief for foot problems associated with equinus.    To diagnose equinus, the foot and ankle surgeon will evaluate the ankle's range of motion when the knee is flexed (bent) as well as extended (straightened). This enables the surgeon to identify whether the tendon or muscle is tight and to assess whether bone is interfering with ankle motion. X-rays may also be ordered. In some cases, the foot and ankle surgeon may refer the  patient for neurologic evaluation.    Nonsurgical Treatment  Treatment includes strategies aimed at relieving the symptoms and conditions associated with equinus. In addition, the patient is treated for the equinus itself through one or more of the following options:    Night splint. The foot may be placed in a splint at night to keep it in a position that helps reduce tightness of the calf muscle.  Heel lifts. Placing heel lifts inside the shoes or wearing shoes with a moderate heel takes stress off the Achilles tendon when walking and may reduce symptoms.  Arch supports or orthotic devices. Custom orthotic devices that fit into the shoe are often prescribed to keep weight distributed properly and to help control muscle/tendon imbalance.  Physical therapy. To help remedy muscle tightness, exercises that stretch the calf muscle(s) are recommended.     When Is Surgery Needed?  In some cases, surgery may be needed to correct the cause of equinus if it is related to a tight tendon or a bone blocking the ankle motion. The foot and ankle surgeon will determine the type of procedure that is best suited to the individual patient.                Ankle Dorsiflexion/Plantarflexion (Flexibility)    Sit on the floor or in bed with your legs straight in front of you.  Point both feet. Then flex both feet.  Do this 10 to 30 times in a row.  Repeat this exercise 2 times a day, or as instructed.  Date Last Reviewed: 5/1/2016 © 2000-2016 [employer]. [address] [place] [address]. All rights reserved. This information is not intended as a substitute for professional medical care. Always follow your healthcare professional's instructions.          Arch retraining    These exercises are for your right foot. Switch sides for your left foot.  Sit in a chair or stand with both feet flat on the floor. Press down with the ball of your right foot, but only on the left side of the foot, just under the big  toe.  Then pull the bottom of your big toe back toward your heel. This should pull up the arch of your foot. Dont flex your toes while doing this. It is a subtle movement of the arch.  Hold for 5 seconds. Relax.  Date Last Reviewed: 3/10/2016  © 4217-7347 [employer]. [address] [place] [address]. All rights reserved. This information is not intended as a substitute for professional medical care. Always follow your healthcare professional's instructions.        Soleus Stretch (Flexibility)    Stand facing a wall from 3 feet away. Take one step toward the wall with your right foot.  Place both palms on the wall. Bend both knees and lean forward. Keep both heels on the floor.  Hold for 30 to 60 seconds. Then relax both legs. Repeat the exercise 2 times.  Switch legs and repeat.  Repeat this exercise 3 times a day, or as instructed.     Tip: Dont bounce while youre stretching.   Date Last Reviewed: 3/10/2016  © 9005-6367 [employer]. [address] [place] [address]. All rights reserved. This information is not intended as a substitute for professional medical care. Always follow your healthcare professional's instructions.          Recommended OTC orthotics:  -powerstep  -superfeet    Recommended shoegear:  -new balance  -ascics  -rebecca faria

## 2022-12-19 NOTE — PROGRESS NOTES
[facility] - PODIATRY  [address]  [facility] [address]  Dept: [phone]  Dept Fax: [phone]    Philip Donnelly Jr., DPM     Assessment:   MDM    Coding  1. Plantar fasciitis, bilateral  X-Ray Foot Complete Left    X-Ray Foot Complete Right    Tendon Sheath    Tendon Sheath      2. Acquired posterior equinus of both lower extremities            Plan:     Tendon Sheath    Date/Time: 12/19/2022 2:25 PM  Performed by: Philip Donnelly Jr., DPM  Authorized by: Philip Donnelly Jr., DPM     Consent Done?:  Yes (Verbal)  Indications:  Pain  Site marked: the procedure site was marked    Timeout: prior to procedure the correct patient, procedure, and site was verified    Prep: patient was prepped and draped in usual sterile fashion      Local anesthesia used?: Yes    Anesthesia:  Local infiltration  Local anesthetic:  Bupivacaine 0.25% without epinephrine  Anesthetic total (ml):  2    Location:  Foot  Foot joint: R PF.  Ultrasonic guidance for needle placement?: No    Needle size:  25 G  Approach:  Medial  Medications:  4 mg dexAMETHasone 4 mg/mL  Patient tolerance:  Patient tolerated the procedure well with no immediate complications  Tendon Sheath    Date/Time: 12/19/2022 2:26 PM  Performed by: Philip Donnelly Jr., DPM  Authorized by: Philip Donnelly Jr., DPM     Consent Done?:  Yes (Verbal)  Indications:  Pain  Site marked: the procedure site was marked    Timeout: prior to procedure the correct patient, procedure, and site was verified    Prep: patient was prepped and draped in usual sterile fashion      Local anesthesia used?: Yes    Anesthesia:  Local infiltration  Local anesthetic:  Bupivacaine 0.25% without epinephrine  Anesthetic total (ml):  2    Location:  Foot  Foot joint: L PF.  Ultrasonic guidance for needle placement?: No    Needle size:  25 G  Approach:  Medial  Medications:  4 mg dexAMETHasone 4 mg/mL  Patient tolerance:  Patient tolerated the procedure  well with no immediate complications    Jason was seen today for plantar fasciitis and heel pain.    Diagnoses and all orders for this visit:    Plantar fasciitis, bilateral  -     X-Ray Foot Complete Left; Future  -     X-Ray Foot Complete Right; Future  -     Tendon Sheath  -     Tendon Sheath    Acquired posterior equinus of both lower extremities        -pt seen, evaluated, and managed  -dx discussed in detail. All questions/concerns addressed  -all tx options discussed. All alternatives, risks, benefits of all txs discussed  -the patient was educated about the diagnosis  -We discussed conservative care options possible including but not limited to shoe wear and/or padding, bracing/strapping, at home ROM, formal PT, medical therapy, injection therapy  - The utilization of NSAIDs can be considered but their benefit has to be tempered against the risk of GI/ concerns  - A steroid injection can be undertaken.  We did discuss the potential mechanism of action of this shot.  Understanding that multiple injections at the same anatomic site do have deleterious effects on the soft tissue.  Generic risks include: steroid flare (advised to ice if necessary), skin hypo-pgimentation (which can be permanent and unsightly), elevation of blood sugar, subcutaneous atrophy (can be permanent) and infection.   -xr/imaging on way out--> will review at nxt visit  -labs reviewed by me: ok for vgel  -implemented icing/stretching regimen  -heel lifts dispensed  - A steroid injection can be undertaken.  We did discuss the potential mechanism of action of this shot.  Understanding that multiple injections at the same anatomic site do have deleterious effects on the soft tissue.  Generic risks include: steroid flare (advised to ice if necessary), skin hypo-pgimentation (which can be permanent and unsightly), elevation of blood sugar, subcutaneous atrophy (can be permanent) and infection.     -rxs dispensed: none  -referrals: none  -WB:  wbat      Follow up in about 4 weeks (around 1/16/2023).    Subjective:      Patient ID: Jason Dutton is a 52 y.o. male.    Chief Complaint:   Chief Complaint   Patient presents with    Plantar Fasciitis     Bilateral    Heel Pain     Plantar front pain, bilateral       CC - foot pain: patient presents to the podiatry clinic  with complaint of  bilateral foot pain. Onset of the symptoms was several weeks ago. Precipitating event: unk. Current symptoms include: ability to bear weight, but with some pain, michael the heel, swelling and worsening symptoms after a period of inactivity. Aggravating factors: walking and certain shoegear. Symptoms have gradually worsened. Patient has had no prior foot problems. Evaluation to date: none. Treatment to date: none. Patients rates pain 7/10 on pain scale.      HPI    Last Podiatry Enc: Visit date not found  Last Enc w/ Me: Visit date not found    Outside reports reviewed: historical medical records.  Family hx: as below  Past Medical History:   Diagnosis Date    DJD (degenerative joint disease), lumbar      Past Surgical History:   Procedure Laterality Date    EPIDURAL STEROID INJECTION N/A 3/24/2021    Procedure: INJECTION, STEROID, EPIDURAL, IL L4-5;  Surgeon: Jake Felder MD;  Location: [facility];  Service: Pain Management;  Laterality: N/A;  CONSENT NEEDED    INJECTION OF ANESTHETIC AGENT AROUND NERVE Right 9/4/2019    Procedure: BLOCK, NERVE, L2,L3,L4,L5 MEDIAL BRANCH;  Surgeon: Jake Felder MD;  Location: [facility] PAIN [facility]GT;  Service: Pain Management;  Laterality: Right;    INJECTION OF JOINT Right 2/9/2022    Procedure: INJECTION, JOINT, SI RIGHT;  Surgeon: Jake Felder MD;  Location: [facility]GT;  Service: Pain Management;  Laterality: Right;    RADIOFREQUENCY ABLATION Right 10/2/2019    Procedure: RADIOFREQUENCY ABLATION, RIGHT L2-L3-L4-L5 MEDIAL BRANCH;  Surgeon: Jake Felder MD;  Location: [facility]GT;  Service: Pain Management;  Laterality:  Right;     Family History   Problem Relation Age of Onset    Coronary artery disease Father      Current Outpatient Medications   Medication Sig Dispense Refill    diclofenac sodium (VOLTAREN) 1 % Gel Apply 2 g topically 4 (four) times daily. 1 each 2    ibuprofen (ADVIL,MOTRIN) 600 MG tablet TK 1 T PO TID      metaxalone (SKELAXIN) 800 MG tablet Take 1 tablet (800 mg total) by mouth 3 (three) times daily as needed for Pain. 270 tablet 2    naproxen (NAPROSYN) 500 MG tablet Take 1 tablet (500 mg total) by mouth 2 (two) times daily with meals. 60 tablet 2     No current facility-administered medications for this visit.     Review of patient's allergies indicates:   Allergen Reactions    Penicillins Hives     Social History     Socioeconomic History    Marital status:    Tobacco Use    Smoking status: Never    Smokeless tobacco: Never   Substance and Sexual Activity    Alcohol use: Yes     Comment: socially    Drug use: No       ROS    REVIEW OF SYSTEMS: Negative as documented below as well as positive findings in bold.       Constitutional  Respiratory  Gastrointestinal  Skin   - Fever - Cough - Heartburn - Rash   - Chills - Spit blood - Nausea - Itching   - Weight Loss - Shortness of breath - Vomiting - Nail pain   - Malaise/Fatigue - Wheezing - Abdominal Pain  Wound/Ulcer   - Weight Gain   - Blood in Stool  Poor wound healing       - Diarrhea          Cardiovascular  Genitourinary  Neurological  HEENT   - Chest Pain - Dysuria - Burning Sensation of feet - Headache   - Palpitations - Hematuria - Tingling / Paresthesia - Congestion   - Pain at night in legs - Flank Pain - Dizziness - Sore Throat   - Cramping   - Tremor - Blurred Vision   - Leg Swelling   - Sensory Change - Double Vision   - Dizzy when standing   - Speech Change - Eye Redness       - Focal Weakness - Dry Eyes       - Loss of Consciousness          Endocrine  Musculoskeletal  Psychiatric   - Cold intolerance - Muscle Pain - Depression   - Heat  "intolerance - Neck Pain - Insomnia   - Anemia - Joint Pain - Memory Loss   -  Easy bruising, bleeding - Heel pain - Anxiety      Toe Pain        Leg/Ankle/Foot Pain         Objective:     Ht 6' 4" (1.93 m)   Wt 115.7 kg (255 lb 1.6 oz)   BMI 31.05 kg/m²   Vitals:    12/19/22 1346   Weight: 115.7 kg (255 lb 1.6 oz)   Height: 6' 4" (1.93 m)   PainSc:   5   PainLoc: Foot       Physical Exam    General Appearance:   Patient appears well developed, well nourished  Patient appears stated age    Psychiatric:   Patient is oriented to time, place, and person.  Patient has appropriate mood and affect    Neck:  Trachea Midline  No visible masses    Respiratory/Ears:  No distress or labored breathing.  Able to differentiate between normal talking voice and whisper.  Able to follow commands    Eyes:  Visual Acuity intact  Lids and conjunctivae normal. No discoloration noted.    Foot Exam  Physical Exam  Ortho Exam  Ortho/SPM Exam  Foot/Ankle Musculoskeletal Exam    B/l LE exam con't:  V:  DP 2/4, PT 2/4   CRT< 3s to all digits tested   Tibial and popliteal lymph nodes are w/o abnormality   Edema: absent, varicosities: absent    N:  Patient displays normal ankle reflexes   SILT in SP/DP/T/Radha/Saph distributions    Ortho: +Motor EHL/FHL/TA/GA   equinus deformity present  There is moderate pain with palpation of b/l medial calcaneal tubercle  Compartments soft/compressible. No pain on passive stretch of big toe. No calf  Pain.    Derm:  skin intact, skin warm and dry, skin without ulcers or lesions, skin without induration, nails normal, texture turgor well hydrated      Imaging / Labs:      No results found.      Note: This was dictated using a computer transcription program. Although proofread, it may contain computer transcription errors and phonetic errors. Other human proofreading errors may also exist. Corrections may be performed at a later time. Please contact us for any clarification if needed.    Philip Donnelly,  " DPM [facility]     Quality 402: Tobacco Use And Help With Quitting Among Adolescents: Patient screened for tobacco and is an ex-smoker Quality 131: Pain Assessment And Follow-Up: Pain assessment NOT documented as being performed, documentation the patient is not eligible for a pain assessment using a standardized tool Quality 226: Preventive Care And Screening: Tobacco Use: Screening And Cessation Intervention: Patient screened for tobacco use and is an ex/non-smoker Detail Level: Detailed Quality 110: Preventive Care And Screening: Influenza Immunization: Influenza Immunization Administered during Influenza season

## 2022-12-27 ENCOUNTER — HOSPITAL ENCOUNTER (OUTPATIENT)
Dept: RADIOLOGY | Facility: HOSPITAL | Age: 52
Discharge: HOME | End: 2022-12-27
Attending: PODIATRIST
Payer: COMMERCIAL

## 2022-12-27 DIAGNOSIS — M72.2 PLANTAR FASCIITIS, BILATERAL: ICD-10-CM

## 2022-12-27 PROCEDURE — 73630 X-RAY EXAM OF FOOT: CPT | Mod: 26,,, | Performed by: RADIOLOGY

## 2022-12-27 PROCEDURE — 73630 X-RAY EXAM OF FOOT: CPT | Mod: TC,50

## 2022-12-27 PROCEDURE — 73630 XR FOOT COMPLETE 3 VIEW BILATERAL: ICD-10-PCS | Mod: 26,,, | Performed by: RADIOLOGY

## 2022-12-28 DIAGNOSIS — M47.816 LUMBAR SPONDYLOSIS: ICD-10-CM

## 2022-12-28 DIAGNOSIS — G89.4 CHRONIC PAIN SYNDROME: ICD-10-CM

## 2022-12-28 DIAGNOSIS — M79.18 MYOFASCIAL MUSCLE PAIN: ICD-10-CM

## 2022-12-28 DIAGNOSIS — M47.816 SPONDYLOSIS OF LUMBAR REGION WITHOUT MYELOPATHY OR RADICULOPATHY: ICD-10-CM

## 2022-12-28 DIAGNOSIS — M54.50 CHRONIC RIGHT-SIDED LOW BACK PAIN WITHOUT SCIATICA: ICD-10-CM

## 2022-12-28 DIAGNOSIS — G89.29 CHRONIC RIGHT-SIDED LOW BACK PAIN WITHOUT SCIATICA: ICD-10-CM

## 2022-12-28 DIAGNOSIS — M54.17 LUMBOSACRAL RADICULOPATHY: ICD-10-CM

## 2022-12-28 DIAGNOSIS — M51.37 DDD (DEGENERATIVE DISC DISEASE), LUMBOSACRAL: ICD-10-CM

## 2022-12-28 RX ORDER — NAPROXEN 500 MG/1
500 TABLET ORAL 2 TIMES DAILY WITH MEALS
Qty: 60 TABLET | Refills: 2 | OUTPATIENT
Start: 2022-12-28

## 2022-12-28 RX ORDER — DICLOFENAC SODIUM 10 MG/G
2 GEL TOPICAL 4 TIMES DAILY
Qty: 1 EACH | Refills: 2 | OUTPATIENT
Start: 2022-12-28

## 2023-01-10 RX ORDER — DEXAMETHASONE SODIUM PHOSPHATE 4 MG/ML
4 INJECTION, SOLUTION INTRA-ARTICULAR; INTRALESIONAL; INTRAMUSCULAR; INTRAVENOUS; SOFT TISSUE
Status: DISCONTINUED | OUTPATIENT
Start: 2022-12-19 | End: 2023-01-10 | Stop reason: HOSPADM

## 2023-05-16 ENCOUNTER — PATIENT OUTREACH (OUTPATIENT)
Dept: ADMINISTRATIVE | Facility: HOSPITAL | Age: 53
End: 2023-05-16
Payer: COMMERCIAL

## 2023-05-16 ENCOUNTER — PATIENT MESSAGE (OUTPATIENT)
Dept: ADMINISTRATIVE | Facility: HOSPITAL | Age: 53
End: 2023-05-16
Payer: COMMERCIAL

## 2023-09-19 ENCOUNTER — OFFICE VISIT (OUTPATIENT)
Dept: INTERNAL MEDICINE | Facility: CLINIC | Age: 53
End: 2023-09-19
Payer: COMMERCIAL

## 2023-09-19 VITALS
WEIGHT: 244.25 LBS | HEART RATE: 67 BPM | BODY MASS INDEX: 29.74 KG/M2 | DIASTOLIC BLOOD PRESSURE: 88 MMHG | SYSTOLIC BLOOD PRESSURE: 138 MMHG | OXYGEN SATURATION: 98 % | HEIGHT: 76 IN

## 2023-09-19 DIAGNOSIS — L98.9 SKIN LESION OF BACK: ICD-10-CM

## 2023-09-19 DIAGNOSIS — Z00.00 ROUTINE PHYSICAL EXAMINATION: Primary | ICD-10-CM

## 2023-09-19 DIAGNOSIS — M51.36 DDD (DEGENERATIVE DISC DISEASE), LUMBAR: ICD-10-CM

## 2023-09-19 PROCEDURE — 99999 PR PBB SHADOW E&M-EST. PATIENT-LVL IV: CPT | Mod: PBBFAC,,, | Performed by: INTERNAL MEDICINE

## 2023-09-19 PROCEDURE — 3079F DIAST BP 80-89 MM HG: CPT | Mod: CPTII,S$GLB,, | Performed by: INTERNAL MEDICINE

## 2023-09-19 PROCEDURE — 99396 PR PREVENTIVE VISIT,EST,40-64: ICD-10-PCS | Mod: S$GLB,,, | Performed by: INTERNAL MEDICINE

## 2023-09-19 PROCEDURE — 99396 PREV VISIT EST AGE 40-64: CPT | Mod: S$GLB,,, | Performed by: INTERNAL MEDICINE

## 2023-09-19 PROCEDURE — 99999 PR PBB SHADOW E&M-EST. PATIENT-LVL IV: ICD-10-PCS | Mod: PBBFAC,,, | Performed by: INTERNAL MEDICINE

## 2023-09-19 PROCEDURE — 3079F PR MOST RECENT DIASTOLIC BLOOD PRESSURE 80-89 MM HG: ICD-10-PCS | Mod: CPTII,S$GLB,, | Performed by: INTERNAL MEDICINE

## 2023-09-19 PROCEDURE — 1159F PR MEDICATION LIST DOCUMENTED IN MEDICAL RECORD: ICD-10-PCS | Mod: CPTII,S$GLB,, | Performed by: INTERNAL MEDICINE

## 2023-09-19 PROCEDURE — 3008F BODY MASS INDEX DOCD: CPT | Mod: CPTII,S$GLB,, | Performed by: INTERNAL MEDICINE

## 2023-09-19 PROCEDURE — 3075F SYST BP GE 130 - 139MM HG: CPT | Mod: CPTII,S$GLB,, | Performed by: INTERNAL MEDICINE

## 2023-09-19 PROCEDURE — 1159F MED LIST DOCD IN RCRD: CPT | Mod: CPTII,S$GLB,, | Performed by: INTERNAL MEDICINE

## 2023-09-19 PROCEDURE — 3075F PR MOST RECENT SYSTOLIC BLOOD PRESS GE 130-139MM HG: ICD-10-PCS | Mod: CPTII,S$GLB,, | Performed by: INTERNAL MEDICINE

## 2023-09-19 PROCEDURE — 3008F PR BODY MASS INDEX (BMI) DOCUMENTED: ICD-10-PCS | Mod: CPTII,S$GLB,, | Performed by: INTERNAL MEDICINE

## 2023-09-19 PROCEDURE — 1160F PR REVIEW ALL MEDS BY PRESCRIBER/CLIN PHARMACIST DOCUMENTED: ICD-10-PCS | Mod: CPTII,S$GLB,, | Performed by: INTERNAL MEDICINE

## 2023-09-19 PROCEDURE — 1160F RVW MEDS BY RX/DR IN RCRD: CPT | Mod: CPTII,S$GLB,, | Performed by: INTERNAL MEDICINE

## 2023-09-19 NOTE — PATIENT INSTRUCTIONS
"Patient Education       Low Salt Diet   About this topic   Sodium is a type of mineral found in many foods. It may also be called "salt." Sodium helps balance fluids in your body. Too much sodium may be bad for your health. You may have to limit the amount of sodium in your food.  Salt is known as sodium chloride. It is measured in grams (g) or milligrams (mg). Salt or sodium in our diet comes from 3 main sources:  Some sodium is naturally found in food.  We may add salt to our food when we eat or cook.  Processed foods give us most of the sodium in our diet.         What will the results be?   This diet may help lower your blood pressure. It may also help reduce extra water in your body. This may help kidney, heart, or liver problems.  What changes to diet are needed?   You need to know how much sodium is in the food you eat. Read food labels with care. Choose foods that have 5% or less sodium in one serving. Remember, if you eat more than one serving, you will be getting more sodium. It may take a while for your sense of taste to get used to food with less sodium. Be patient with yourself. You may be surprised at how well you will do.  Try to aim for a diet that has 2,300 mg (2.3 g) or less sodium in it each day. The Food & Drug Administration (FDA) has set up guidelines for food labels. These will help you make healthy choices. Look for these terms on food packages:  Sodium-free: Less than 5 mg in each serving. These are safe to eat.  Very low sodium: 35 mg of sodium or less in each serving. These are safe to eat.  Low sodium: 140 mg of sodium or less in each serving. You need to eat these with care.  Reduced sodium: At least 25% less sodium than is most often found in each serving. These foods can still be high in sodium.  Light in sodium: 50% less sodium in each serving.  Unsalted, no added salt, and without added salt: No salt is added during processing, but the food may still have sodium. Read the food label " and check the sodium content before eating.  What foods are good to eat?   You can control the amount of sodium in foods you make at home. Fresh foods that you cook are most often lower in sodium.  Regular bread, unsalted crackers, dry cereal, cooked rice, pasta, quinoa, and corn tortillas.  Fresh, frozen, low sodium, or salt-free canned vegetables. Limit vegetable juice or tomato juice to 1/2 cup (120 mL) each day.  Fresh, frozen, canned, or dried fruit without salt added  Nonfat or low-fat milk and yogurt, low-sodium cottage cheese, and other cheeses low in sodium.  Fresh or frozen beef, veal, lamb, pork, poultry, fish, shellfish  Low sodium canned meats, frozen dinners with less than 600 mg sodium  Corn, safflower, sunflower, and soybean oils and unsalted nuts and seeds  Egg and egg substitutes without added sodium  Dried peas, beans, and low-sodium peanut butter  All plain oils and low-sodium salad dressing  Low-sodium broths, soups, soy sauce, condiments, and snack foods  Pepper, herbs, spices, vinegar, lemon or lime juice  Low-sodium carbonated drinks  What foods should be limited or avoided?   Foods that are prepackaged or canned are most often high in sodium. Foods to limit or avoid include:  Salted breads, rolls, crackers, biscuits, cornbread  Quick breads, self-rising flours, biscuit mixes, regular bread crumbs, instant hot cereals  Commercially prepared rice, pasta, or stuffing mixes; potatoes and vegetable mixes  Regular canned vegetables and juices, vegetables with sauce, and pickled vegetables  Frozen vegetables with seasonings and sauces  Processed fruits with salt or sodium  Malted and chocolate milk and buttermilk  Regular and processed cheese and spreads  Smoked, cured, salted, or canned meat, fish, or poultry such as avilez, sausages, sardines, chipped beef, hot dogs, cold cuts, and frozen breaded meats  Salted and canned peas, beans, and olives  Salted snack foods and nuts  Oils mixed with  high-sodium parts such as salad dressing  Meat tenderizers, seasoning salt, and most flavored vinegars  Ketchup, bouillon cubes, salt, sea salt, kosher salt, onion salt, garlic salt, and pink Himalayan salt  What can be done to prevent this health problem?   Check with your doctor before using salt substitutes. Also, check the labels when taking over-the-counter (OTC) drugs such as laxatives and antacids. These can have a high sodium content.  When do I need to call the doctor?   Call your doctor if you have questions about this diet. Talk to a dietitian. They can help you find hidden sources of sodium in the food you eat.  Helpful tips   Use the nutrition facts labels as a guide to look for foods lower in sodium.  Do not use salt when eating or cooking.  Select fresh fruits and vegetables for snacks.  If you are eating out, ask the  to cook your food without salt, or choose foods without sauces.  Season your food with herbs and spices.  Drain and rinse canned beans and vegetables that contain sodium.  Eat foods with potassium. Potassium helps counter the effects of sodium. This may help lower your blood pressure. Foods high in potassium include: Potatoes, tomatoes, bananas, oranges, cantaloupe, beans, and greens.  Where can I learn more?   American Heart Association  https://www.heart.org/en/healthy-living/healthy-eating/eat-smart/sodium/how-to-reduce-sodium   American Heart Association  https://www.heart.org/en/healthy-living/healthy-eating/eat-smart/nutrition-basics/food-packaging-claims   NHS  https://www.nhs.uk/live-well/eat-well/tips-for-a-lower-salt-diet/   UpToDate  http://www.[employer].com/contents/low-sodium-diet-beyond-the-basics   Last Reviewed Date   2021-10-11  Consumer Information Use and Disclaimer   This information is not specific medical advice and does not replace information you receive from your health care provider. This is only a brief summary of general information. It does NOT include  all information about conditions, illnesses, injuries, tests, procedures, treatments, therapies, discharge instructions or life-style choices that may apply to you. You must talk with your health care provider for complete information about your health and treatment options. This information should not be used to decide whether or not to accept your health care providers advice, instructions or recommendations. Only your health care provider has the knowledge and training to provide advice that is right for you.  Copyright   Copyright © 2021 [employer] and its affiliates and/or licensors. All rights reserved.

## 2023-09-19 NOTE — PROGRESS NOTES
Subjective:       Patient ID: Jason Dutton is a 53 y.o. male.    Chief Complaint: Annual Exam    53-year-old comes in for annual exam.  Blood pressure was a little borderline initially but improved on repeat.  We discussed monitoring, reducing salt, watching weight.    He has had some intermittent low back discomfort and has seen back and spine but is thinking about PT or even a chiropractor.  He would like some labs done   He has a small skin lesion on the back that he would like me to look at.      Review of Systems   Constitutional:  Negative for chills, fatigue and fever.   HENT:  Negative for nosebleeds and trouble swallowing.    Eyes:  Negative for pain and visual disturbance.   Respiratory:  Negative for cough, shortness of breath and wheezing.    Cardiovascular:  Negative for chest pain and palpitations.   Gastrointestinal:  Negative for abdominal pain, constipation, diarrhea, nausea and vomiting.   Genitourinary:  Negative for difficulty urinating and hematuria.   Musculoskeletal:  Positive for back pain. Negative for arthralgias and neck pain.   Integumentary:  Positive for mole/lesion. Negative for rash.   Neurological:  Negative for dizziness and headaches.   Hematological:  Does not bruise/bleed easily.   Psychiatric/Behavioral:  Negative for dysphoric mood and sleep disturbance.            Past Medical History:   Diagnosis Date    DJD (degenerative joint disease), lumbar      Past Surgical History:   Procedure Laterality Date    EPIDURAL STEROID INJECTION N/A 3/24/2021    Procedure: INJECTION, STEROID, EPIDURAL, IL L4-5;  Surgeon: Jake Felder MD;  Location: [facility];  Service: Pain Management;  Laterality: N/A;  CONSENT NEEDED    INJECTION OF ANESTHETIC AGENT AROUND NERVE Right 9/4/2019    Procedure: BLOCK, NERVE, L2,L3,L4,L5 MEDIAL BRANCH;  Surgeon: Jake Felder MD;  Location: [facility];  Service: Pain Management;  Laterality: Right;    INJECTION OF JOINT Right 2/9/2022     Procedure: INJECTION, JOINT, SI RIGHT;  Surgeon: Jake Feledr MD;  Location: [facility];  Service: Pain Management;  Laterality: Right;    RADIOFREQUENCY ABLATION Right 10/2/2019    Procedure: RADIOFREQUENCY ABLATION, RIGHT L2-L3-L4-L5 MEDIAL BRANCH;  Surgeon: Jake Felder MD;  Location: [facility];  Service: Pain Management;  Laterality: Right;      Patient Active Problem List   Diagnosis    Lumbar spondylosis    Lumbar facet arthropathy    DDD (degenerative disc disease), lumbar    Lumbar discogenic pain syndrome    Sensorineural hearing loss (SNHL) of left ear with unrestricted hearing of right ear    Tinnitus of left ear    Nasal valve collapse    Nasal septal deviation    Allergic rhinitis    Chronic pain    Chronic bilateral low back pain without sciatica    Muscle weakness        Objective:      Physical Exam  Constitutional:       General: He is not in acute distress.     Appearance: He is well-developed.   HENT:      Head: Normocephalic and atraumatic.      Right Ear: Tympanic membrane, ear canal and external ear normal.      Left Ear: Tympanic membrane, ear canal and external ear normal.      Mouth/Throat:      Pharynx: No oropharyngeal exudate or posterior oropharyngeal erythema.   Eyes:      General: No scleral icterus.     Conjunctiva/sclera: Conjunctivae normal.      Pupils: Pupils are equal, round, and reactive to light.   Neck:      Thyroid: No thyromegaly.      Comments: No supraclavicular nodes palpated  Cardiovascular:      Rate and Rhythm: Normal rate and regular rhythm.      Pulses: Normal pulses.      Heart sounds: Normal heart sounds. No murmur heard.  Pulmonary:      Effort: Pulmonary effort is normal.      Breath sounds: Normal breath sounds. No wheezing.   Abdominal:      General: Bowel sounds are normal.      Palpations: Abdomen is soft. There is no mass.      Tenderness: There is no abdominal tenderness.   Musculoskeletal:         General: Tenderness (low back) present.  "     Cervical back: Normal range of motion and neck supple.      Right lower leg: No edema.      Left lower leg: No edema.   Lymphadenopathy:      Cervical: No cervical adenopathy.   Skin:     Coloration: Skin is not jaundiced or pale.      Findings: Lesion (Numerous small brown nevi.  The 1 in question is just to the right of midline low back.  Slightly raised.  Possible small SK.  Less than 2 mm in diameter) present.   Neurological:      General: No focal deficit present.      Mental Status: He is alert and oriented to person, place, and time.   Psychiatric:         Mood and Affect: Mood normal.         Behavior: Behavior normal.         Assessment:       Problem List Items Addressed This Visit          Neuro    DDD (degenerative disc disease), lumbar     Other Visit Diagnoses       Routine physical examination    -  Primary    Relevant Orders    CBC Auto Differential    Comprehensive Metabolic Panel    Lipid Panel    PSA, Screening    Hemoglobin A1C    TSH    Skin lesion of back        Relevant Orders    Ambulatory referral/consult to Dermatology            Plan:         Jason was seen today for annual exam.    Diagnoses and all orders for this visit:    Routine physical examination  -     CBC Auto Differential; Future  -     Comprehensive Metabolic Panel; Future  -     Lipid Panel; Future  -     PSA, Screening; Future  -     Hemoglobin A1C; Future  -     TSH; Future    Skin lesion of back  -     Ambulatory referral/consult to Dermatology; Future    DDD (degenerative disc disease), lumbar           Monitor blood pressure-update me on readings  Work on losing 5-10 lb and reducing salt  Due for colon screening in the spring.  He may consider Cologuard again          Portions of this note may have been created with voice recognition software. Occasional "wrong-word" or "sound-a-like" substitutions may have occurred due to the inherent limitations of voice recognition software. Please, read the note carefully and " recognize, using context, where substitutions have occurred.

## 2023-09-21 ENCOUNTER — LAB VISIT (OUTPATIENT)
Dept: LAB | Facility: HOSPITAL | Age: 53
End: 2023-09-21
Attending: INTERNAL MEDICINE
Payer: COMMERCIAL

## 2023-09-21 DIAGNOSIS — Z00.00 ROUTINE PHYSICAL EXAMINATION: ICD-10-CM

## 2023-09-21 LAB
ALBUMIN SERPL BCP-MCNC: 4.3 G/DL (ref 3.5–5.2)
ALP SERPL-CCNC: 76 U/L (ref 55–135)
ALT SERPL W/O P-5'-P-CCNC: 50 U/L (ref 10–44)
ANION GAP SERPL CALC-SCNC: 9 MMOL/L (ref 8–16)
AST SERPL-CCNC: 37 U/L (ref 10–40)
BASOPHILS # BLD AUTO: 0.05 K/UL (ref 0–0.2)
BASOPHILS NFR BLD: 1.1 % (ref 0–1.9)
BILIRUB SERPL-MCNC: 0.8 MG/DL (ref 0.1–1)
BUN SERPL-MCNC: 16 MG/DL (ref 6–20)
CALCIUM SERPL-MCNC: 9.5 MG/DL (ref 8.7–10.5)
CHLORIDE SERPL-SCNC: 105 MMOL/L (ref 95–110)
CHOLEST SERPL-MCNC: 224 MG/DL (ref 120–199)
CHOLEST/HDLC SERPL: 7.7 {RATIO} (ref 2–5)
CO2 SERPL-SCNC: 23 MMOL/L (ref 23–29)
COMPLEXED PSA SERPL-MCNC: 0.42 NG/ML (ref 0–4)
CREAT SERPL-MCNC: 1.6 MG/DL (ref 0.5–1.4)
DIFFERENTIAL METHOD: NORMAL
EOSINOPHIL # BLD AUTO: 0.1 K/UL (ref 0–0.5)
EOSINOPHIL NFR BLD: 3.1 % (ref 0–8)
ERYTHROCYTE [DISTWIDTH] IN BLOOD BY AUTOMATED COUNT: 13.5 % (ref 11.5–14.5)
EST. GFR  (NO RACE VARIABLE): 50.9 ML/MIN/1.73 M^2
ESTIMATED AVG GLUCOSE: 103 MG/DL (ref 68–131)
GLUCOSE SERPL-MCNC: 99 MG/DL (ref 70–110)
HBA1C MFR BLD: 5.2 % (ref 4–5.6)
HCT VFR BLD AUTO: 44.9 % (ref 40–54)
HDLC SERPL-MCNC: 29 MG/DL (ref 40–75)
HDLC SERPL: 12.9 % (ref 20–50)
HGB BLD-MCNC: 15.3 G/DL (ref 14–18)
IMM GRANULOCYTES # BLD AUTO: 0.01 K/UL (ref 0–0.04)
IMM GRANULOCYTES NFR BLD AUTO: 0.2 % (ref 0–0.5)
LDLC SERPL CALC-MCNC: ABNORMAL MG/DL (ref 63–159)
LYMPHOCYTES # BLD AUTO: 1.6 K/UL (ref 1–4.8)
LYMPHOCYTES NFR BLD: 33.8 % (ref 18–48)
MCH RBC QN AUTO: 30.4 PG (ref 27–31)
MCHC RBC AUTO-ENTMCNC: 34.1 G/DL (ref 32–36)
MCV RBC AUTO: 89 FL (ref 82–98)
MONOCYTES # BLD AUTO: 0.4 K/UL (ref 0.3–1)
MONOCYTES NFR BLD: 9 % (ref 4–15)
NEUTROPHILS # BLD AUTO: 2.4 K/UL (ref 1.8–7.7)
NEUTROPHILS NFR BLD: 52.8 % (ref 38–73)
NONHDLC SERPL-MCNC: 195 MG/DL
NRBC BLD-RTO: 0 /100 WBC
PLATELET # BLD AUTO: 179 K/UL (ref 150–450)
PMV BLD AUTO: 11.9 FL (ref 9.2–12.9)
POTASSIUM SERPL-SCNC: 4.2 MMOL/L (ref 3.5–5.1)
PROT SERPL-MCNC: 7.7 G/DL (ref 6–8.4)
RBC # BLD AUTO: 5.04 M/UL (ref 4.6–6.2)
SODIUM SERPL-SCNC: 137 MMOL/L (ref 136–145)
TRIGL SERPL-MCNC: 712 MG/DL (ref 30–150)
TSH SERPL DL<=0.005 MIU/L-ACNC: 1.28 UIU/ML (ref 0.4–4)
WBC # BLD AUTO: 4.58 K/UL (ref 3.9–12.7)

## 2023-09-21 PROCEDURE — 84443 ASSAY THYROID STIM HORMONE: CPT | Performed by: INTERNAL MEDICINE

## 2023-09-21 PROCEDURE — 83036 HEMOGLOBIN GLYCOSYLATED A1C: CPT | Performed by: INTERNAL MEDICINE

## 2023-09-21 PROCEDURE — 84153 ASSAY OF PSA TOTAL: CPT | Performed by: INTERNAL MEDICINE

## 2023-09-21 PROCEDURE — 80053 COMPREHEN METABOLIC PANEL: CPT | Performed by: INTERNAL MEDICINE

## 2023-09-21 PROCEDURE — 80061 LIPID PANEL: CPT | Performed by: INTERNAL MEDICINE

## 2023-09-21 PROCEDURE — 36415 COLL VENOUS BLD VENIPUNCTURE: CPT | Performed by: INTERNAL MEDICINE

## 2023-09-21 PROCEDURE — 85025 COMPLETE CBC W/AUTO DIFF WBC: CPT | Performed by: INTERNAL MEDICINE

## 2023-09-26 ENCOUNTER — PATIENT MESSAGE (OUTPATIENT)
Dept: INTERNAL MEDICINE | Facility: CLINIC | Age: 53
End: 2023-09-26
Payer: COMMERCIAL

## 2024-04-15 ENCOUNTER — OFFICE VISIT (OUTPATIENT)
Dept: PODIATRY | Facility: CLINIC | Age: 54
End: 2024-04-15
Payer: COMMERCIAL

## 2024-04-15 VITALS
HEIGHT: 76 IN | BODY MASS INDEX: 29.74 KG/M2 | DIASTOLIC BLOOD PRESSURE: 91 MMHG | WEIGHT: 244.25 LBS | SYSTOLIC BLOOD PRESSURE: 160 MMHG | HEART RATE: 65 BPM

## 2024-04-15 DIAGNOSIS — Q66.70 HIGH FOOT ARCH: ICD-10-CM

## 2024-04-15 DIAGNOSIS — M72.2 PLANTAR FASCIITIS: Primary | ICD-10-CM

## 2024-04-15 PROCEDURE — 3077F SYST BP >= 140 MM HG: CPT | Mod: CPTII,S$GLB,, | Performed by: PODIATRIST

## 2024-04-15 PROCEDURE — 1159F MED LIST DOCD IN RCRD: CPT | Mod: CPTII,S$GLB,, | Performed by: PODIATRIST

## 2024-04-15 PROCEDURE — 99999 PR PBB SHADOW E&M-EST. PATIENT-LVL III: CPT | Mod: PBBFAC,,, | Performed by: PODIATRIST

## 2024-04-15 PROCEDURE — 3008F BODY MASS INDEX DOCD: CPT | Mod: CPTII,S$GLB,, | Performed by: PODIATRIST

## 2024-04-15 PROCEDURE — 3080F DIAST BP >= 90 MM HG: CPT | Mod: CPTII,S$GLB,, | Performed by: PODIATRIST

## 2024-04-15 PROCEDURE — 99213 OFFICE O/P EST LOW 20 MIN: CPT | Mod: S$GLB,,, | Performed by: PODIATRIST

## 2024-04-15 RX ORDER — IVERMECTIN 3 MG/1
TABLET ORAL
COMMUNITY
Start: 2023-12-27

## 2024-04-15 NOTE — PROGRESS NOTES
[facility] - PODIATRY  [address]  [place] [address]  Dept: [phone]  Dept Fax: [phone]    Yvonne Malhotra DPM     Assessment:       1. Plantar fasciitis  Ambulatory referral/consult to Physical/Occupational Therapy    ORTHOTIC DEVICE (DME)      2. High foot arch  ORTHOTIC DEVICE (DME)            Plan:       Jason was seen today for heel pain.    Diagnoses and all orders for this visit:    Plantar fasciitis  -     Ambulatory referral/consult to Physical/Occupational Therapy; Future  -     ORTHOTIC DEVICE (DME)    High foot arch  -     ORTHOTIC DEVICE (DME)          I counseled the patient on the patient's conditions, their implications and medical management.     We discussed the etiology of the problem and the patient was given literature regarding plantar fasciitis and stretching.      We also discussed proper shoe gear.    custom molded orthotics were also recommended.  Discussed icing the affected area as needed and also wearing appropriate shoe gear and avoiding flats, slippers, sandals, and going barefoot.       I spent a total of 20 minutes on the day of the visit.  This includes face to face time and non-face to face time preparing to see the patient (eg, review of tests), obtaining and/or reviewing separately obtained history, documenting clinical information in the electronic or other health record, independently interpreting results and communicating results to the patient/family/caregiver, or care coordinator.      Subjective:      Patient ID: Jason Dutton is a 53 y.o. male.    Chief Complaint:   Chief Complaint   Patient presents with    Heel Pain     Right heel numbness       CC - foot pain: patient presents to the podiatry clinic  with concerns of Heel Pain (Right heel numbness)  Chronic, exacerbated past month.   He has previously seen Dr. Donnelly for similar.        Past Medical History:   Diagnosis Date    DJD (degenerative joint disease),  lumbar      Past Surgical History:   Procedure Laterality Date    EPIDURAL STEROID INJECTION N/A 3/24/2021    Procedure: INJECTION, STEROID, EPIDURAL, IL L4-5;  Surgeon: Jake Felder MD;  Location: [facility] PAIN MGT;  Service: Pain Management;  Laterality: N/A;  CONSENT NEEDED    INJECTION OF ANESTHETIC AGENT AROUND NERVE Right 2019    Procedure: BLOCK, NERVE, L2,L3,L4,L5 MEDIAL BRANCH;  Surgeon: Jake Felder MD;  Location: [facility] PAIN MGT;  Service: Pain Management;  Laterality: Right;    INJECTION OF JOINT Right 2022    Procedure: INJECTION, JOINT, SI RIGHT;  Surgeon: Jake Felder MD;  Location: [facility] PAIN MGT;  Service: Pain Management;  Laterality: Right;    RADIOFREQUENCY ABLATION Right 10/2/2019    Procedure: RADIOFREQUENCY ABLATION, RIGHT L2-L3-L4-L5 MEDIAL BRANCH;  Surgeon: aJke Felder MD;  Location: [facility] PAIN MGT;  Service: Pain Management;  Laterality: Right;     Family History   Problem Relation Name Age of Onset    Coronary artery disease Father       Current Outpatient Medications   Medication Sig Dispense Refill    diclofenac sodium (VOLTAREN) 1 % Gel Apply 2 g topically 4 (four) times daily. 1 each 2    ibuprofen (ADVIL,MOTRIN) 600 MG tablet TK 1 T PO TID      ivermectin (STROMECTOL) 3 mg Tab SMARTSI Tablet(s) By Mouth Daily      metaxalone (SKELAXIN) 800 MG tablet Take 1 tablet (800 mg total) by mouth 3 (three) times daily as needed for Pain. 270 tablet 2    naproxen (NAPROSYN) 500 MG tablet Take 1 tablet (500 mg total) by mouth 2 (two) times daily with meals. 60 tablet 2     No current facility-administered medications for this visit.     Review of patient's allergies indicates:   Allergen Reactions    Penicillins Hives     Social History     Socioeconomic History    Marital status:    Tobacco Use    Smoking status: Never    Smokeless tobacco: Never   Substance and Sexual Activity    Alcohol use: Yes     Comment: socially    Drug use: No       REVIEW OF SYSTEMS:  "Negative as documented below as well as positive findings in bold.       Constitutional  Respiratory  Gastrointestinal  Skin   - Fever - Cough - Heartburn - Rash   - Chills - Spit blood - Nausea - Itching   - Weight Loss - Shortness of breath - Vomiting - Nail pain   - Malaise/Fatigue - Wheezing - Abdominal Pain  Wound/Ulcer   - Weight Gain   - Blood in Stool  Poor wound healing       - Diarrhea          Cardiovascular  Genitourinary  Neurological  HEENT   - Chest Pain - Dysuria - Burning Sensation of feet - Headache   - Palpitations - Hematuria - Tingling / Paresthesia - Congestion   - Pain at night in legs - Flank Pain - Dizziness - Sore Throat   - Cramping   - Tremor - Blurred Vision   - Leg Swelling   - Sensory Change - Double Vision   - Dizzy when standing   - Speech Change - Eye Redness       - Focal Weakness - Dry Eyes       - Loss of Consciousness          Endocrine  Musculoskeletal  Psychiatric   - Cold intolerance - Muscle Pain - Depression   - Heat intolerance - Neck Pain - Insomnia   - Anemia - Joint Pain - Memory Loss   -  Easy bruising, bleeding - Heel pain - Anxiety      Toe Pain        Leg/Ankle/Foot Pain         Objective:     BP (!) 160/91   Pulse 65   Ht 6' 4" (1.93 m)   Wt 110.8 kg (244 lb 4.3 oz)   BMI 29.73 kg/m²   Vitals:    04/15/24 1401   BP: (!) 160/91   Pulse: 65   Weight: 110.8 kg (244 lb 4.3 oz)   Height: 6' 4" (1.93 m)   PainSc: 0-No pain   PainLoc: Foot       Physical Exam    General Appearance:   Patient appears well developed, well nourished  Patient appears stated age    Psychiatric:   Patient is oriented to time, place, and person.  Patient has appropriate mood and affect    Neck:  Trachea Midline  No visible masses    Respiratory/Ears:  No distress or labored breathing.  Able to differentiate between normal talking voice and whisper.  Able to follow commands    Eyes:  Visual Acuity intact  Lids and conjunctivae normal. No discoloration noted.    Foot Exam  Physical Exam  Ortho " Exam  Ortho/SPM Exam  Foot/Ankle Musculoskeletal Exam    B/l LE exam con't:  V:  DP 2/4, PT 2/4   CRT< 3s to all digits tested   Tibial and popliteal lymph nodes are w/o abnormality   Edema: absent, varicosities: absent    N:  Patient displays normal ankle reflexes   SILT in SP/DP/T/Radha/Saph distributions    Ortho: +Motor EHL/FHL/TA/GA   equinus deformity present  There is moderate pain with palpation of b/l medial calcaneal tubercle  Compartments soft/compressible. No pain on passive stretch of big toe. No calf  Pain.    Derm:  skin intact, skin warm and dry, skin without ulcers or lesions, skin without induration, nails normal, texture turgor well hydrated

## 2024-04-16 ENCOUNTER — PATIENT MESSAGE (OUTPATIENT)
Dept: PODIATRY | Facility: CLINIC | Age: 54
End: 2024-04-16
Payer: COMMERCIAL

## 2024-04-17 DIAGNOSIS — M72.2 PLANTAR FASCIITIS: ICD-10-CM

## 2024-04-17 DIAGNOSIS — M72.2 PLANTAR FASCIITIS, BILATERAL: Primary | ICD-10-CM

## 2024-04-18 ENCOUNTER — HOSPITAL ENCOUNTER (OUTPATIENT)
Dept: RADIOLOGY | Facility: HOSPITAL | Age: 54
Discharge: HOME | End: 2024-04-18
Attending: PODIATRIST
Payer: COMMERCIAL

## 2024-04-18 DIAGNOSIS — M72.2 PLANTAR FASCIITIS, BILATERAL: ICD-10-CM

## 2024-04-18 DIAGNOSIS — M72.2 PLANTAR FASCIITIS: ICD-10-CM

## 2024-04-18 PROCEDURE — 73630 X-RAY EXAM OF FOOT: CPT | Mod: TC,50

## 2024-04-18 PROCEDURE — 73630 X-RAY EXAM OF FOOT: CPT | Mod: 26,,, | Performed by: RADIOLOGY

## 2024-05-01 ENCOUNTER — CLINICAL SUPPORT (OUTPATIENT)
Dept: REHABILITATION | Facility: HOSPITAL | Age: 54
End: 2024-05-01
Attending: PODIATRIST
Payer: COMMERCIAL

## 2024-05-01 DIAGNOSIS — M72.2 PLANTAR FASCIITIS: ICD-10-CM

## 2024-05-01 PROCEDURE — 97110 THERAPEUTIC EXERCISES: CPT | Mod: PN

## 2024-05-01 PROCEDURE — 97162 PT EVAL MOD COMPLEX 30 MIN: CPT | Mod: PN

## 2024-05-01 NOTE — PLAN OF CARE
[facility]  Physical Therapy Initial Evaluation    Date: 5/1/2024   Name: Jason Dutton  Clinic Number: [id number]    Therapy Diagnosis:   Encounter Diagnosis   Name Primary?    Plantar fasciitis      Physician: Yvonne Malhotra DPM    Physician Orders: PT Eval and Treat   Medical Diagnosis from Referral: M72.2 (ICD-10-CM) - Plantar fasciitis   Evaluation Date: 5/1/2024  Authorization Period Expiration: 12/31/2024  Plan of Care Expiration: 6/24/2024  Visit # / Visits authorized: 1/1    Time In: 10:10 am  Time Out: 11:00 am  Total Appointment Time (timed & untimed codes): 50 minutes    Precautions: Standard    Subjective   Date of onset: ~ few years   History of current condition - Jason reports: he's a chronic history of plantar fasciitis since he was in his twenties, which eventually went away with some stretches. Recently onset of worsening right heel/arch pain. He reports right foot numbness/tingling at times. Aggravating factors include increased walking and increased activity levels. Easing factors include resting and rolling his heel.      Medical History:   Past Medical History:   Diagnosis Date    DJD (degenerative joint disease), lumbar        Surgical History:   Jason Dutton  has a past surgical history that includes Injection of anesthetic agent around nerve (Right, 9/4/2019); Radiofrequency ablation (Right, 10/2/2019); Epidural steroid injection (N/A, 3/24/2021); and Injection of joint (Right, 2/9/2022).    Medications:   Jason has a current medication list which includes the following prescription(s): diclofenac sodium, ibuprofen, ivermectin, metaxalone, and naproxen.    Allergies:   Review of patient's allergies indicates:   Allergen Reactions    Penicillins Hives        Imaging: see EMR     Prior Therapy: yes, for his low back, not plantar fascitis   Social History: lives with family   Occupation: engineering firm   Prior Level of Function: 100%   Current Level of Function:  100% - limiting some activities     Pain:  Current 3/10, worst 5/10, best 0/10   Location: right knee   Description: aggravating; numb, tingling   Aggravating Factors: see EMR  Easing Factors: see EMR    Pt's goals:   Patient would like to improve his foot pain/sensation so he can return to walking pain free.     Objective   Observation: high arches     Range of Motion: AROM:  Ankle Right Left   Dorsiflexion  10 degrees  14 degrees   Plantarflexion  50 degrees  55 degrees   Inversion  50 degrees  60 degrees   Eversion  20 degrees  30 degrees     Strength:  Hip Right Left   Supine flexion 5/5 5/5   Abduction 4+/5 4+/5   Extension 4+/5 4+/5     Ankle Right Left   Dorsiflexion 4+/5 4+/5   Plantarflexion  20 unilateral heel raises 20 unilateral heel raises   Inversion 4+/5 4+/5   Eversion 4+/5 4+/5     Special Tests: windlass test     Joint Mobility: talocrural hypomobility     Palpation:  posterior tibialis tender to palpation      Sensation: WNL     Flexibility: gastroc/soleus     Limitation/Restriction for FOTO Foot Survey    Therapist reviewed FOTO scores for Jason Dutton on 2024.   FOTO documents entered into EPIC - see Media section.    Limitation Score: 40%  Predicted Limitation Score: 24%         TREATMENT     Total Treatment time (time-based codes) separate from Evaluation: 15 minutes    Jason received therapeutic exercises to develop strength, endurance, ROM, and flexibility for 15 minutes includin way ankle theraband series  Gastroc fitter stretch    Home Exercises and Patient Education Provided    Education provided:   - HEP  - POC/prognosis     Written Home Exercises Provided: yes.  Exercises were reviewed and Jason was able to demonstrate them prior to the end of the session.  Jason demonstrated good  understanding of the education provided.     See EMR under Patient Instructions for exercises provided 2024.    Assessment   Jason is a 54 y.o. male referred to outpatient Physical Therapy with a  medical diagnosis of plantar fascitis. Patient's signs and symptoms are consistent with medical diagnosis along with tarsal tunnel syndrome. Significant gastroc/soleus tightness noted along with medial/lateral instability. Patient would benefit from skilled PT focusing on right gastroc/soleus mobility in conjunction with ankle stabilization training.     Pt to be seen 2x/week for 6 weeks     Pt prognosis is Excellent.   Pt will benefit from skilled outpatient Physical Therapy to address the deficits stated above and in the chart below, provide pt/family education, and to maximize pt's level of independence.     Plan of care discussed with patient: Yes  Pt's spiritual, cultural and educational needs considered and patient is agreeable to the plan of care and goals as stated below:     Anticipated Barriers for therapy: chronicity     Medical Necessity is demonstrated by the following  History  Co-morbidities and personal factors that may impact the plan of care Co-morbidities:   none    Personal Factors:   no deficits     low   Examination  Body Structures and Functions, activity limitations and participation restrictions that may impact the plan of care Body Regions:   lower extremities    Body Systems:    gross symmetry  ROM  strength  gross coordinated movement  balance  gait  transfers  transitions  motor control  motor learning    Participation Restrictions:   Extended walking     Activity limitations:   Learning and applying knowledge  no deficits    General Tasks and Commands  no deficits    Communication  no deficits    Mobility  no deficits    Self care  no deficits    Domestic Life  no deficits    Interactions/Relationships  no deficits    Life Areas  no deficits    Community and Social Life  no deficits         high   Clinical Presentation evolving clinical presentation with changing clinical characteristics moderate   Decision Making/ Complexity Score: moderate     Goals:  Short Term Goals: 3 weeks   1.  Maintain compliance and understanding of HEP. - PROGRESSING, NOT MET  2. Decrease subjective pain rating from a 5/10 pain with standing/walking to a 2/10 pain with standing/walking. - PROGRESSING, NOT MET  3. Increase bilateral hip strength from a 4/5 to a 5/5 with manual muscle testing to offload plantar fascia. - PROGRESSING, NOT MET        Long Term Goals: 6 weeks   1. Decrease FOTO limitation score from 40% limitation to </= 24% limitation for better functional outcomes. - PROGRESSING, NOT MET  2. Increase ankle dorsiflexion and plantar flexion active range of motion to within normal limits in order to offload plantar fascia. - PROGRESSING, NOT MET  3. Patient will be able to walk 1 mile without reproduction of (R) heel pain. - PROGRESSING, NOT MET  4. Decrease subjective pain rating from a 5/10 pain with standing/walking to a 0/10 pain with standing/walking. - PROGRESSING, NOT MET    Plan   Plan of care Certification: 5/1/2024 to 6/24/2024    Outpatient Physical Therapy 2 times weekly for 6 weeks to include the following interventions: Manual Therapy, Moist Heat/ Ice, Neuromuscular Re-ed, Patient Education, Self Care, Therapeutic Activities, and Therapeutic Exercise.     Carlos Asher, PT

## 2024-05-06 ENCOUNTER — CLINICAL SUPPORT (OUTPATIENT)
Dept: REHABILITATION | Facility: HOSPITAL | Age: 54
End: 2024-05-06
Payer: COMMERCIAL

## 2024-05-06 DIAGNOSIS — M72.2 PLANTAR FASCIITIS: Primary | ICD-10-CM

## 2024-05-06 PROCEDURE — 97110 THERAPEUTIC EXERCISES: CPT | Mod: PN

## 2024-05-06 PROCEDURE — 97140 MANUAL THERAPY 1/> REGIONS: CPT | Mod: PN

## 2024-05-06 NOTE — PROGRESS NOTES
"[facility]   Physical Therapy Treatment Note      Name: Jason Dutton  Clinic Number: [id number]    Therapy Diagnosis: No diagnosis found.  Physician: Yvonne Malhotra DPM    Visit Date: 5/6/2024    Physician Orders: PT Eval and Treat   Medical Diagnosis from Referral: M72.2 (ICD-10-CM) - Plantar fasciitis   Evaluation Date: 5/1/2024  Authorization Period Expiration: 12/31/2024  Plan of Care Expiration: 6/24/2024  Visit # / Visits authorized: 2/20      Time In: 9:05 am  Time Out: 10:00 am  Total Appointment Time (timed & untimed codes): 55 minutes     Precautions: Standard     Subjective     Patient reports: his right foot is doing about the same, but he's been keeping up with the exercises at home.   He was compliant with home exercise program.  Response to previous treatment: no change   Functional change: no change     Pain: 2/10  Location: right foot     Objective      Objective Measures updated at progress report unless specified.     Treatment     Jason received the treatments listed below:      therapeutic exercises to develop strength, endurance, ROM, and flexibility for 40 minutes including:  Ankle plantarflexion: 3x10 - Blue theraband   Ankle inversion: 3x10 - green theraband   Ankle eversion: 3x10 - green theraband   Ankle dorsiflexion: 3x10 - green theraband   Gastroc fitter stretch: 3x30"  Soleus fitter oscillations: 20x   Seated ankle plantarflexion: 45# KB   Standing single leg heel raises: 3x10       manual therapy techniques: Joint mobilizations were applied to the: right ankle for 15 minutes, including:  A/P talocrural joint mobilizations- grade III/IV  A/P big toe mobilizations - grade III-IV      Patient Education and Home Exercises       Education provided:   - home exercise program   - POC/Prognosis     Written Home Exercises Provided: yes. Exercises were reviewed and Jason was able to demonstrate them prior to the end of the session.  Jason demonstrated good  " understanding of the education provided. See Electronic Medical Record under Patient Instructions for exercises provided during therapy sessions    Assessment   Jason is a 54 y.o. male referred to outpatient Physical Therapy with a medical diagnosis of plantar fascitis. Patient's signs and symptoms are consistent with medical diagnosis along with tarsal tunnel syndrome. Significant gastroc/soleus tightness noted along with medial/lateral instability.First visit following initial evaluation consisted of right ankle flexibility and mobility in conjunction with ankle stabilization training.     Jason Is progressing well towards his goals.   Patient prognosis is Excellent.     Patient will continue to benefit from skilled outpatient physical therapy to address the deficits listed in the problem list box on initial evaluation, provide pt/family education and to maximize pt's level of independence in the home and community environment.     Patient's spiritual, cultural and educational needs considered and pt agreeable to plan of care and goals.     Anticipated barriers to physical therapy: none    Goals:   Short Term Goals: 3 weeks   1. Maintain compliance and understanding of HEP. - PROGRESSING, NOT MET  2. Decrease subjective pain rating from a 5/10 pain with standing/walking to a 2/10 pain with standing/walking. - PROGRESSING, NOT MET  3. Increase bilateral hip strength from a 4/5 to a 5/5 with manual muscle testing to offload plantar fascia. - PROGRESSING, NOT MET        Long Term Goals: 6 weeks   1. Decrease FOTO limitation score from 40% limitation to </= 24% limitation for better functional outcomes. - PROGRESSING, NOT MET  2. Increase ankle dorsiflexion and plantar flexion active range of motion to within normal limits in order to offload plantar fascia. - PROGRESSING, NOT MET  3. Patient will be able to walk 1 mile without reproduction of (R) heel pain. - PROGRESSING, NOT MET  4. Decrease subjective pain rating  from a 5/10 pain with standing/walking to a 0/10 pain with standing/walking. - PROGRESSING, NOT MET    Plan   Gastroc/soleus mobility  Ankle stabilization    Carlos Asher, PT

## 2024-05-13 ENCOUNTER — CLINICAL SUPPORT (OUTPATIENT)
Dept: REHABILITATION | Facility: HOSPITAL | Age: 54
End: 2024-05-13
Payer: COMMERCIAL

## 2024-05-13 DIAGNOSIS — M72.2 PLANTAR FASCIITIS: Primary | ICD-10-CM

## 2024-05-13 PROCEDURE — 97110 THERAPEUTIC EXERCISES: CPT | Mod: PN

## 2024-05-13 PROCEDURE — 97140 MANUAL THERAPY 1/> REGIONS: CPT | Mod: PN

## 2024-05-13 NOTE — PROGRESS NOTES
"[facility]   Physical Therapy Treatment Note      Name: Jason Dutton  Clinic Number: [id number]    Therapy Diagnosis:   Encounter Diagnosis   Name Primary?    Plantar fasciitis Yes     Physician: Yvonne Malhotra DPM    Visit Date: 5/13/2024    Physician Orders: PT Eval and Treat   Medical Diagnosis from Referral: M72.2 (ICD-10-CM) - Plantar fasciitis   Evaluation Date: 5/1/2024  Authorization Period Expiration: 12/31/2024  Plan of Care Expiration: 6/24/2024  Visit # / Visits authorized: 3/20      Time In: 9:05 am  Time Out: 10:00 am  Total Appointment Time (timed & untimed codes): 55 minutes     Precautions: Standard     Subjective     Patient reports: he believes thing are improving from the exercise program.   He was compliant with home exercise program.  Response to previous treatment: no change   Functional change: no change     Pain: 2/10  Location: right foot     Objective      Objective Measures updated at progress report unless specified.     Treatment     Jason received the treatments listed below:      therapeutic exercises to develop strength, endurance, ROM, and flexibility for 40 minutes including:  Ankle plantarflexion: 3x10 - Blue theraband   Ankle inversion: 3x10 - green theraband   Ankle eversion: 3x10 - green theraband   Ankle dorsiflexion: 3x10 - green theraband   Gastroc fitter stretch: 3x30"  Soleus fitter oscillations: 20x   Seated ankle plantarflexion: 45# KB   Standing single leg heel raises: 3x10   Toe yoga: 30x      manual therapy techniques: Joint mobilizations were applied to the: right ankle for 15 minutes, including:  A/P talocrural joint mobilizations- grade III/IV  A/P big toe mobilizations - grade III-IV      Patient Education and Home Exercises       Education provided:   - home exercise program   - POC/Prognosis     Written Home Exercises Provided: yes. Exercises were reviewed and Jason was able to demonstrate them prior to the end of the session.  Jason " demonstrated good  understanding of the education provided. See Electronic Medical Record under Patient Instructions for exercises provided during therapy sessions    Assessment   Jason is a 54 y.o. male referred to outpatient Physical Therapy with a medical diagnosis of plantar fascitis. Patient's signs and symptoms are consistent with medical diagnosis along with tarsal tunnel syndrome. Significant gastroc/soleus tightness noted along with medial/lateral instability. Continued with right ankle flexibility and mobility in conjunction with ankle stabilization training.     Jason Is progressing well towards his goals.   Patient prognosis is Excellent.     Patient will continue to benefit from skilled outpatient physical therapy to address the deficits listed in the problem list box on initial evaluation, provide pt/family education and to maximize pt's level of independence in the home and community environment.     Patient's spiritual, cultural and educational needs considered and pt agreeable to plan of care and goals.     Anticipated barriers to physical therapy: none    Goals:   Short Term Goals: 3 weeks   1. Maintain compliance and understanding of HEP. - PROGRESSING, NOT MET  2. Decrease subjective pain rating from a 5/10 pain with standing/walking to a 2/10 pain with standing/walking. - PROGRESSING, NOT MET  3. Increase bilateral hip strength from a 4/5 to a 5/5 with manual muscle testing to offload plantar fascia. - PROGRESSING, NOT MET        Long Term Goals: 6 weeks   1. Decrease FOTO limitation score from 40% limitation to </= 24% limitation for better functional outcomes. - PROGRESSING, NOT MET  2. Increase ankle dorsiflexion and plantar flexion active range of motion to within normal limits in order to offload plantar fascia. - PROGRESSING, NOT MET  3. Patient will be able to walk 1 mile without reproduction of (R) heel pain. - PROGRESSING, NOT MET  4. Decrease subjective pain rating from a 5/10 pain with  standing/walking to a 0/10 pain with standing/walking. - PROGRESSING, NOT MET    Plan   Gastroc/soleus mobility  Ankle stabilization    Carlos Asher, PT

## 2024-05-20 ENCOUNTER — CLINICAL SUPPORT (OUTPATIENT)
Dept: REHABILITATION | Facility: HOSPITAL | Age: 54
End: 2024-05-20
Payer: COMMERCIAL

## 2024-05-20 DIAGNOSIS — M72.2 PLANTAR FASCIITIS: Primary | ICD-10-CM

## 2024-05-20 PROCEDURE — 97110 THERAPEUTIC EXERCISES: CPT | Mod: PN

## 2024-05-20 NOTE — PROGRESS NOTES
"[facility]   Physical Therapy Treatment Note      Name: Jason Dutton  Clinic Number: [id number]    Therapy Diagnosis:   Encounter Diagnosis   Name Primary?    Plantar fasciitis Yes     Physician: Yvonne Malhotra DPM    Visit Date: 5/20/2024    Physician Orders: PT Eval and Treat   Medical Diagnosis from Referral: M72.2 (ICD-10-CM) - Plantar fasciitis   Evaluation Date: 5/1/2024  Authorization Period Expiration: 12/31/2024  Plan of Care Expiration: 6/24/2024  Visit # / Visits authorized: 3/20      Time In: 9:05 am  Time Out: 10:00 am  Total Appointment Time (timed & untimed codes): 55 minutes     Precautions: Standard     Subjective     Patient reports: he believes thing are improving from the exercise program.   He was compliant with home exercise program.  Response to previous treatment: no change   Functional change: no change     Pain: 2/10  Location: right foot     Objective      Objective Measures updated at progress report unless specified.     Treatment     Jason received the treatments listed below:      therapeutic exercises to develop strength, endurance, ROM, and flexibility for 55 minutes including:  Ankle plantarflexion: 3x10 - black theraband   Ankle inversion: 3x10 - black theraband   Ankle eversion: 3x10 - black theraband   Gastroc fitter stretch: 3x30"  Soleus fitter oscillations: 20x   Seated ankle plantarflexion: 45# KB   Standing single leg heel raises: 3x10   Toe yoga: 30x  Foot domes: 5"x20         Patient Education and Home Exercises       Education provided:   - home exercise program   - POC/Prognosis     Written Home Exercises Provided: yes. Exercises were reviewed and Jason was able to demonstrate them prior to the end of the session.  Jason demonstrated good  understanding of the education provided. See Electronic Medical Record under Patient Instructions for exercises provided during therapy sessions    Assessment   Jason is a 54 y.o. male referred to outpatient " Physical Therapy with a medical diagnosis of plantar fascitis. Patient's signs and symptoms are consistent with medical diagnosis along with tarsal tunnel syndrome. Significant gastroc/soleus tightness noted along with medial/lateral instability. Continued with right ankle flexibility and mobility in conjunction with ankle stabilization training. Planning to progress standing/functional strengthening program in the coming visits.     Jason Is progressing well towards his goals.   Patient prognosis is Excellent.     Patient will continue to benefit from skilled outpatient physical therapy to address the deficits listed in the problem list box on initial evaluation, provide pt/family education and to maximize pt's level of independence in the home and community environment.     Patient's spiritual, cultural and educational needs considered and pt agreeable to plan of care and goals.     Anticipated barriers to physical therapy: none    Goals:   Short Term Goals: 3 weeks   1. Maintain compliance and understanding of HEP. - PROGRESSING, NOT MET  2. Decrease subjective pain rating from a 5/10 pain with standing/walking to a 2/10 pain with standing/walking. - PROGRESSING, NOT MET  3. Increase bilateral hip strength from a 4/5 to a 5/5 with manual muscle testing to offload plantar fascia. - PROGRESSING, NOT MET        Long Term Goals: 6 weeks   1. Decrease FOTO limitation score from 40% limitation to </= 24% limitation for better functional outcomes. - PROGRESSING, NOT MET  2. Increase ankle dorsiflexion and plantar flexion active range of motion to within normal limits in order to offload plantar fascia. - PROGRESSING, NOT MET  3. Patient will be able to walk 1 mile without reproduction of (R) heel pain. - PROGRESSING, NOT MET  4. Decrease subjective pain rating from a 5/10 pain with standing/walking to a 0/10 pain with standing/walking. - PROGRESSING, NOT MET    Plan   Gastroc/soleus mobility  Ankle stabilization    Carlos  Nathan Asher, PT

## 2024-06-03 ENCOUNTER — CLINICAL SUPPORT (OUTPATIENT)
Dept: REHABILITATION | Facility: HOSPITAL | Age: 54
End: 2024-06-03
Payer: COMMERCIAL

## 2024-06-03 DIAGNOSIS — M72.2 PLANTAR FASCIITIS: Primary | ICD-10-CM

## 2024-06-03 PROCEDURE — 97110 THERAPEUTIC EXERCISES: CPT | Mod: PN

## 2024-06-03 PROCEDURE — 97530 THERAPEUTIC ACTIVITIES: CPT | Mod: PN

## 2024-06-03 NOTE — PROGRESS NOTES
"[facility]   Physical Therapy Treatment Note      Name: Jason Dutton  Clinic Number: [id number]    Therapy Diagnosis:   Encounter Diagnosis   Name Primary?    Plantar fasciitis Yes     Physician: Yvonne Malhotra DPM    Visit Date: 6/3/2024    Physician Orders: PT Eval and Treat   Medical Diagnosis from Referral: M72.2 (ICD-10-CM) - Plantar fasciitis   Evaluation Date: 5/1/2024  Authorization Period Expiration: 12/31/2024  Plan of Care Expiration: 6/24/2024  Visit # / Visits authorized: 4/20 (+1)     Time In: 10:05 am  Time Out: 11:00 am  Total Appointment Time (timed & untimed codes): 55 minutes     Precautions: Standard     Subjective     Patient reports: he's been doing well. Numbness and tingling intermittent in right foot. He believes the stretching and strengthening exercises are helping.   He was compliant with home exercise program.  Response to previous treatment: no change   Functional change: no change     Pain: 2/10  Location: right foot     Objective      Objective Measures updated at progress report unless specified.     Treatment     Jason received the treatments listed below:      therapeutic exercises to develop strength, endurance, ROM, and flexibility for 40 minutes including:  Long sitting big toe flexion: 30x - red theraband   Long sitting big toe extension: 30x - red theraband   Standing modified single leg stance with heel raise: 3x10 - right   Seated figure 4 ankle inversion: 3x10 - green theraband - right   Soleus fitter oscillations: 20x   Piriformis stretch: 3x30"     therapeutic activities to improve functional performance for 15 minutes, including:  Cybex double leg press: 4x10 - 8 plates   Cybex hip abduction: 3x10 - 4 plates - bilateral     Not today:  Seated single leg heel raises: 3x10 - 45# KB  Single leg heel raises: 3x10      Patient Education and Home Exercises       Education provided:   - home exercise program   - POC/Prognosis     Written Home " Exercises Provided: yes. Exercises were reviewed and Jason was able to demonstrate them prior to the end of the session.  Jason demonstrated good  understanding of the education provided. See Electronic Medical Record under Patient Instructions for exercises provided during therapy sessions    Assessment   Jason is a 54 y.o. male referred to outpatient Physical Therapy with a medical diagnosis of plantar fascitis. Patient's signs and symptoms are consistent with medical diagnosis along with tarsal tunnel syndrome. Significant gastroc/soleus tightness noted along with medial/lateral instability. Continued with right ankle flexibility and mobility in conjunction with ankle stabilization training. Initiated hip mobility, flexibility, and strengthening this visit.     Jason Is progressing well towards his goals.   Patient prognosis is Excellent.     Patient will continue to benefit from skilled outpatient physical therapy to address the deficits listed in the problem list box on initial evaluation, provide pt/family education and to maximize pt's level of independence in the home and community environment.     Patient's spiritual, cultural and educational needs considered and pt agreeable to plan of care and goals.     Anticipated barriers to physical therapy: none    Goals:   Short Term Goals: 3 weeks   1. Maintain compliance and understanding of HEP. - PROGRESSING, NOT MET  2. Decrease subjective pain rating from a 5/10 pain with standing/walking to a 2/10 pain with standing/walking. - PROGRESSING, NOT MET  3. Increase bilateral hip strength from a 4/5 to a 5/5 with manual muscle testing to offload plantar fascia. - PROGRESSING, NOT MET        Long Term Goals: 6 weeks   1. Decrease FOTO limitation score from 40% limitation to </= 24% limitation for better functional outcomes. - PROGRESSING, NOT MET  2. Increase ankle dorsiflexion and plantar flexion active range of motion to within normal limits in order to offload  plantar fascia. - PROGRESSING, NOT MET  3. Patient will be able to walk 1 mile without reproduction of (R) heel pain. - PROGRESSING, NOT MET  4. Decrease subjective pain rating from a 5/10 pain with standing/walking to a 0/10 pain with standing/walking. - PROGRESSING, NOT MET    Plan   Gastroc/soleus mobility  Ankle stabilization    Carlos Asher, PT

## 2024-06-10 ENCOUNTER — CLINICAL SUPPORT (OUTPATIENT)
Dept: REHABILITATION | Facility: HOSPITAL | Age: 54
End: 2024-06-10
Payer: COMMERCIAL

## 2024-06-10 ENCOUNTER — PATIENT MESSAGE (OUTPATIENT)
Dept: INTERNAL MEDICINE | Facility: CLINIC | Age: 54
End: 2024-06-10
Payer: COMMERCIAL

## 2024-06-10 DIAGNOSIS — M72.2 PLANTAR FASCIITIS: Primary | ICD-10-CM

## 2024-06-10 PROCEDURE — 97112 NEUROMUSCULAR REEDUCATION: CPT | Mod: PN

## 2024-06-10 PROCEDURE — 97530 THERAPEUTIC ACTIVITIES: CPT | Mod: PN

## 2024-06-10 NOTE — PROGRESS NOTES
"[facility]   Physical Therapy Treatment Note      Name: Jason Dutton  Clinic Number: [id number]    Therapy Diagnosis:   Encounter Diagnosis   Name Primary?    Plantar fasciitis Yes       Physician: Yvonne Malhotra DPM    Visit Date: 6/10/2024    Physician Orders: PT Eval and Treat   Medical Diagnosis from Referral: M72.2 (ICD-10-CM) - Plantar fasciitis   Evaluation Date: 5/1/2024  Authorization Period Expiration: 12/31/2024  Plan of Care Expiration: 6/24/2024  Visit # / Visits authorized: 4/20 (+1)     Time In: 10:05 am  Time Out: 11:00 am  Total Appointment Time (timed & untimed codes): 55 minutes     Precautions: Standard     Subjective     Patient reports: numbness and tingling continues to be intermittent. Primarily notices increased right foot numbness when he's barefoot.   He was compliant with home exercise program.  Response to previous treatment: no change   Functional change: no change     Pain: 2/10  Location: right foot     Objective      Objective Measures updated at progress report unless specified.     Treatment     Jason received the treatments listed below:      manual therapy techniques: Joint mobilizations and Soft tissue Mobilization were applied to the: right calf for 15 minutes, including:  IASTM to right gastroc/soleus     therapeutic activities to improve functional performance for 40 minutes, including:  Seated hip hinges: 5"x20  Cybex double leg press: 4x10 - 8 plates   Cybex hip abduction: 3x10 - 4 plates - bilateral   Standing hip abduction: 3x10 - green theraband   Standing hip extension: 3x10 - green theraband     NEXT:    Single leg lateral step downs    Seated single leg heel raises: 3x10 - 45# KB  Single leg heel raises: 3x10      Patient Education and Home Exercises       Education provided:   - home exercise program   - POC/Prognosis     Written Home Exercises Provided: yes. Exercises were reviewed and Jason was able to demonstrate them prior to the end of " the session.  Jason demonstrated good  understanding of the education provided. See Electronic Medical Record under Patient Instructions for exercises provided during therapy sessions    Assessment   Jason is a 54 y.o. male referred to outpatient Physical Therapy with a medical diagnosis of plantar fascitis. Patient's signs and symptoms are consistent with medical diagnosis along with tarsal tunnel syndrome. Significant gastroc/soleus tightness noted along with medial/lateral instability. Continued with right ankle flexibility and mobility in conjunction with ankle stabilization training. Initiated hip mobility, flexibility, and strengthening this visit. Progressing lateral hip strength and planning to progress single leg neuromuscular re-education next visit.     Jason Is progressing well towards his goals.   Patient prognosis is Excellent.     Patient will continue to benefit from skilled outpatient physical therapy to address the deficits listed in the problem list box on initial evaluation, provide pt/family education and to maximize pt's level of independence in the home and community environment.     Patient's spiritual, cultural and educational needs considered and pt agreeable to plan of care and goals.     Anticipated barriers to physical therapy: none    Goals:   Short Term Goals: 3 weeks   1. Maintain compliance and understanding of HEP. - PROGRESSING, NOT MET  2. Decrease subjective pain rating from a 5/10 pain with standing/walking to a 2/10 pain with standing/walking. - PROGRESSING, NOT MET  3. Increase bilateral hip strength from a 4/5 to a 5/5 with manual muscle testing to offload plantar fascia. - PROGRESSING, NOT MET        Long Term Goals: 6 weeks   1. Decrease FOTO limitation score from 40% limitation to </= 24% limitation for better functional outcomes. - PROGRESSING, NOT MET  2. Increase ankle dorsiflexion and plantar flexion active range of motion to within normal limits in order to offload  plantar fascia. - PROGRESSING, NOT MET  3. Patient will be able to walk 1 mile without reproduction of (R) heel pain. - PROGRESSING, NOT MET  4. Decrease subjective pain rating from a 5/10 pain with standing/walking to a 0/10 pain with standing/walking. - PROGRESSING, NOT MET    Plan   Gastroc/soleus mobility  Ankle stabilization    Carlos Asher, PT

## 2024-06-17 ENCOUNTER — CLINICAL SUPPORT (OUTPATIENT)
Dept: REHABILITATION | Facility: HOSPITAL | Age: 54
End: 2024-06-17
Payer: COMMERCIAL

## 2024-06-17 DIAGNOSIS — M72.2 PLANTAR FASCIITIS: Primary | ICD-10-CM

## 2024-06-17 PROCEDURE — 97530 THERAPEUTIC ACTIVITIES: CPT | Mod: PN

## 2024-06-17 PROCEDURE — 97112 NEUROMUSCULAR REEDUCATION: CPT | Mod: PN

## 2024-06-17 NOTE — PROGRESS NOTES
"[facility]   Physical Therapy Treatment Note      Name: Jason Dutton  Clinic Number: [id number]    Therapy Diagnosis:   Encounter Diagnosis   Name Primary?    Plantar fasciitis Yes       Physician: Yvonne Malhotra DPM    Visit Date: 6/17/2024    Physician Orders: PT Eval and Treat   Medical Diagnosis from Referral: M72.2 (ICD-10-CM) - Plantar fasciitis   Evaluation Date: 5/1/2024  Authorization Period Expiration: 12/31/2024  Plan of Care Expiration: 6/24/2024  Visit # / Visits authorized: 6/20 (+1)     Time In: 9:05 am  Time Out: 10:00 am  Total Appointment Time (timed & untimed codes): 55 minutes     Precautions: Standard     Subjective     Patient reports: his foot pain has been about the same recently. Primarily flares up with increased activity levels. He played some basketball and pickle ball over the weekend and had some lower grade pain.   He was compliant with home exercise program.  Response to previous treatment: no change   Functional change: no change     Pain: 2/10  Location: right foot     Objective      Objective Measures updated at progress report unless specified.     Treatment     Jason received the treatments listed below:      therapeutic activities to improve functional performance for 40 minutes, including:  Gastroc fitter stretch: 3x30"  Soleus fitter stretch: 3x30"  Seated hip hinges: 5"x20  Cybex double leg press: 4x10 - 8 plates   Cybex hip abduction: 3x10 - 4 plates - bilateral   Standing hip abduction: 3x10 - green theraband   Standing hip extension: 3x10 - green theraband     neuromuscular re-education activities to improve: Balance, Kinesthetic, Proprioception, and Posture for 15 minutes. The following activities were included:   Single leg forward step downs: 3x10 - green step   Seated single leg heel raises: 3x10 - 45# KB  Single leg heel raises: 3x10 - with big toe extension       Patient Education and Home Exercises       Education provided:   - home " exercise program   - POC/Prognosis     Written Home Exercises Provided: yes. Exercises were reviewed and Jason was able to demonstrate them prior to the end of the session.  Jason demonstrated good  understanding of the education provided. See Electronic Medical Record under Patient Instructions for exercises provided during therapy sessions    Assessment   Jason is a 54 y.o. male referred to outpatient Physical Therapy with a medical diagnosis of plantar fascitis. Patient's signs and symptoms are consistent with medical diagnosis along with tarsal tunnel syndrome. Significant gastroc/soleus tightness noted along with medial/lateral instability. Continued with right ankle flexibility and mobility in conjunction with ankle stabilization training. Initiated hip mobility, flexibility, and strengthening this visit. Plan of care update next visit.     Jason Is progressing well towards his goals.   Patient prognosis is Excellent.     Patient will continue to benefit from skilled outpatient physical therapy to address the deficits listed in the problem list box on initial evaluation, provide pt/family education and to maximize pt's level of independence in the home and community environment.     Patient's spiritual, cultural and educational needs considered and pt agreeable to plan of care and goals.     Anticipated barriers to physical therapy: none    Goals:   Short Term Goals: 3 weeks   1. Maintain compliance and understanding of HEP. - PROGRESSING, NOT MET  2. Decrease subjective pain rating from a 5/10 pain with standing/walking to a 2/10 pain with standing/walking. - PROGRESSING, NOT MET  3. Increase bilateral hip strength from a 4/5 to a 5/5 with manual muscle testing to offload plantar fascia. - PROGRESSING, NOT MET        Long Term Goals: 6 weeks   1. Decrease FOTO limitation score from 40% limitation to </= 24% limitation for better functional outcomes. - PROGRESSING, NOT MET  2. Increase ankle dorsiflexion and  plantar flexion active range of motion to within normal limits in order to offload plantar fascia. - PROGRESSING, NOT MET  3. Patient will be able to walk 1 mile without reproduction of (R) heel pain. - PROGRESSING, NOT MET  4. Decrease subjective pain rating from a 5/10 pain with standing/walking to a 0/10 pain with standing/walking. - PROGRESSING, NOT MET    Plan   Gastroc/soleus mobility  Ankle stabilization    Carlos Asher, PT

## 2024-06-24 ENCOUNTER — CLINICAL SUPPORT (OUTPATIENT)
Dept: REHABILITATION | Facility: HOSPITAL | Age: 54
End: 2024-06-24
Payer: COMMERCIAL

## 2024-06-24 DIAGNOSIS — M72.2 PLANTAR FASCIITIS: Primary | ICD-10-CM

## 2024-06-24 PROCEDURE — 97112 NEUROMUSCULAR REEDUCATION: CPT | Mod: PN

## 2024-06-24 PROCEDURE — 97530 THERAPEUTIC ACTIVITIES: CPT | Mod: PN

## 2024-06-24 NOTE — PROGRESS NOTES
"[facility]   Physical Therapy Treatment Note      Name: Jason Dutton  Clinic Number: [id number]    Therapy Diagnosis:   Encounter Diagnosis   Name Primary?    Plantar fasciitis Yes     Physician: Yvonne Malhotra DPM    Visit Date: 6/24/2024    Physician Orders: PT Eval and Treat   Medical Diagnosis from Referral: M72.2 (ICD-10-CM) - Plantar fasciitis   Evaluation Date: 5/1/2024  Authorization Period Expiration: 12/31/2024  Plan of Care Expiration: 6/24/2024  Visit # / Visits authorized: 6/20 (+1)     Time In: 9:05 am  Time Out: 10:00 am  Total Appointment Time (timed & untimed codes): 55 minutes     Precautions: Standard     Subjective     Patient reports: his foot pain has been up and down, but he believes physical therapy is helping. He would like to continue if possible.   He was compliant with home exercise program.  Response to previous treatment: no change   Functional change: no change     Pain: 2/10  Location: right foot     Objective    (6/24/2024):  Range of Motion: AROM:  Ankle Right Left   Dorsiflexion  10 --> 16 degrees  14 degrees   Plantarflexion  50 --> 60 degrees  55 degrees   Inversion  50 --> 50 degrees  60 degrees   Eversion  20 --> 30 degrees  30 degrees      Strength:  Hip Right Left   Supine flexion 5/5 5/5   Abduction 4+/5 4+/5   Extension 4+/5 4+/5      Ankle Right Left   Dorsiflexion 4+/5 4+/5   Plantarflexion  20 unilateral heel raises 20 unilateral heel raises   Inversion 4+/5 4+/5   Eversion 4+/5 4+/5        Treatment     Jason received the treatments listed below:      therapeutic activities to improve functional performance for 40 minutes, including:  Objective tests and measures  Gastroc fitter stretch: 3x30"  Soleus fitter stretch: 3x30"  Seated hip hinges: 5"x20  Cybex double leg press: 4x10 - 8 plates   Cybex hip abduction: 3x10 - 4 plates - bilateral   Standing hip abduction: 3x10 - green theraband   Standing hip extension: 3x10 - green theraband "     neuromuscular re-education activities to improve: Balance, Kinesthetic, Proprioception, and Posture for 15 minutes. The following activities were included:   Single leg forward step downs: 3x10 - green step   Seated single leg heel raises: 3x10 - 45# KB  Single leg heel raises: 3x10 - with big toe extension       Patient Education and Home Exercises       Education provided:   - home exercise program   - POC/Prognosis     Written Home Exercises Provided: yes. Exercises were reviewed and Jason was able to demonstrate them prior to the end of the session.  Jason demonstrated good  understanding of the education provided. See Electronic Medical Record under Patient Instructions for exercises provided during therapy sessions    Assessment   Jason is a 54 y.o. male referred to outpatient Physical Therapy with a medical diagnosis of plantar fascitis. Patient's signs and symptoms are consistent with medical diagnosis along with tarsal tunnel syndrome. Significant gastroc/soleus tightness noted along with medial/lateral instability. Continued with right ankle flexibility and mobility in conjunction with ankle stabilization training. Plan of care update today, and patient is displaying improvements with right ankle range of motion and strength. Recently progressing lateral gluteus strength and patient is displaying some signs and symptoms of right sided lumbar radiculopathy.     Jason Is progressing well towards his goals.   Patient prognosis is Excellent.     Patient will continue to benefit from skilled outpatient physical therapy to address the deficits listed in the problem list box on initial evaluation, provide pt/family education and to maximize pt's level of independence in the home and community environment.     Patient's spiritual, cultural and educational needs considered and pt agreeable to plan of care and goals.     Anticipated barriers to physical therapy: none    Goals:   Short Term Goals: 3 weeks   1.  Maintain compliance and understanding of HEP. - MET  2. Decrease subjective pain rating from a 5/10 pain with standing/walking to a 2/10 pain with standing/walking. -  MET  3. Increase bilateral hip strength from a 4/5 to a 5/5 with manual muscle testing to offload plantar fascia. - MET        Long Term Goals: 6 weeks   1. Decrease FOTO limitation score from 40% limitation to </= 24% limitation for better functional outcomes. - PROGRESSING, NOT MET  2. Increase ankle dorsiflexion and plantar flexion active range of motion to within normal limits in order to offload plantar fascia. - PROGRESSING, NOT MET  3. Patient will be able to walk 1 mile without reproduction of (R) heel pain. - PROGRESSING, NOT MET  4. Decrease subjective pain rating from a 5/10 pain with standing/walking to a 0/10 pain with standing/walking. - PROGRESSING, NOT MET    Plan   Gastroc/soleus mobility  Ankle stabilization  Lateral hip stability     Carlos Asher, PT

## 2024-07-01 NOTE — PLAN OF CARE
[facility]  Physical Therapy Plan of Care Note     Name: Jason Dutton  Clinic Number: [id number]    Therapy Diagnosis:   Encounter Diagnosis   Name Primary?    Plantar fasciitis Yes     Physician: Yvonne Malhotra DPM    Visit Date: 6/24/2024    Physician Orders: PT Eval and Treat   Medical Diagnosis from Referral: M72.2 (ICD-10-CM) - Plantar fasciitis   Evaluation Date: 5/1/2024  Authorization Period Expiration: 12/31/2024  Plan of Care Expiration: 6/24/2024  Visit # / Visits authorized: 6/20 (+1)    Precautions: Standard  Functional Level Prior to Evaluation:  see initial evaluation    SUBJECTIVE     Update: Patient reports: his foot pain has been up and down, but he believes physical therapy is helping. He would like to continue if possible.   He was compliant with home exercise program.  Response to previous treatment: no change   Functional change: no change     Pain: 2/10  Location: right foot     OBJECTIVE     Update:   (6/24/2024):  Range of Motion: AROM:  Ankle Right Left   Dorsiflexion  10 --> 16 degrees  14 degrees   Plantarflexion  50 --> 60 degrees  55 degrees   Inversion  50 --> 50 degrees  60 degrees   Eversion  20 --> 30 degrees  30 degrees      Strength:  Hip Right Left   Supine flexion 5/5 5/5   Abduction 4+/5 4+/5   Extension 4+/5 4+/5      Ankle Right Left   Dorsiflexion 4+/5 4+/5   Plantarflexion  20 unilateral heel raises 20 unilateral heel raises   Inversion 4+/5 4+/5   Eversion 4+/5 4+/5        ASSESSMENT     Update: Jason is a 54 y.o. male referred to outpatient Physical Therapy with a medical diagnosis of plantar fascitis. Patient's signs and symptoms are consistent with medical diagnosis along with tarsal tunnel syndrome. Significant gastroc/soleus tightness noted along with medial/lateral instability. Continued with right ankle flexibility and mobility in conjunction with ankle stabilization training. Plan of care update today, and patient is displaying  improvements with right ankle range of motion and strength. Recently progressing lateral gluteus strength and patient is displaying some signs and symptoms of right sided lumbar radiculopathy.     Previous Short Term Goals Status: met  New Short Term Goals Status: d/c  Long Term Goal Status: continue per initial plan of care.  Reasons for Recertification of Therapy:  POC update     PLAN     Updated Certification Period: 6/24/2024 to 8/5/2024   Recommended Treatment Plan: 1 times per week for 6 weeks:  Gait Training, Manual Therapy, Neuromuscular Re-ed, Patient Education, Self Care, Therapeutic Activities, and Therapeutic Exercise    Carlos Asher, PT

## 2024-07-02 ENCOUNTER — CLINICAL SUPPORT (OUTPATIENT)
Dept: REHABILITATION | Facility: HOSPITAL | Age: 54
End: 2024-07-02
Payer: COMMERCIAL

## 2024-07-02 DIAGNOSIS — M72.2 PLANTAR FASCIITIS: Primary | ICD-10-CM

## 2024-07-02 PROCEDURE — 97112 NEUROMUSCULAR REEDUCATION: CPT | Mod: PN

## 2024-07-02 PROCEDURE — 97530 THERAPEUTIC ACTIVITIES: CPT | Mod: PN

## 2024-07-02 NOTE — PROGRESS NOTES
"[facility]   Physical Therapy Treatment Note      Name: Jason Dutton  Clinic Number: [id number]    Therapy Diagnosis:   Encounter Diagnosis   Name Primary?    Plantar fasciitis Yes     Physician: Yvonne Malhotra DPM    Visit Date: 7/2/2024    Physician Orders: PT Eval and Treat   Medical Diagnosis from Referral: M72.2 (ICD-10-CM) - Plantar fasciitis   Evaluation Date: 5/1/2024  Authorization Period Expiration: 12/31/2024  Plan of Care Expiration: 6/24/2024  Visit # / Visits authorized: 6/20 (+1)     Time In: 1:05 am  Time Out: 2:00 am  Total Appointment Time (timed & untimed codes): 55 minutes     Precautions: Standard     Subjective     Patient reports: his right foot/heel has been feeling better but he recently started having left sided sciatic type symptoms. Numbness/tingling associated with left foot. Symptoms are vague and associated with a warmness  He was compliant with home exercise program.  Response to previous treatment: no change   Functional change: no change     Pain: 2/10  Location: right foot     Objective    (6/24/2024):  Range of Motion: AROM:  Ankle Right Left   Dorsiflexion  10 --> 16 degrees  14 degrees   Plantarflexion  50 --> 60 degrees  55 degrees   Inversion  50 --> 50 degrees  60 degrees   Eversion  20 --> 30 degrees  30 degrees      Strength:  Hip Right Left   Supine flexion 5/5 5/5   Abduction 4+/5 4+/5   Extension 4+/5 4+/5      Ankle Right Left   Dorsiflexion 4+/5 4+/5   Plantarflexion  20 unilateral heel raises 20 unilateral heel raises   Inversion 4+/5 4+/5   Eversion 4+/5 4+/5        Treatment     Jason received the treatments listed below:      therapeutic activities to improve functional performance for 40 minutes, including:  Objective tests and measures  Gastroc fitter stretch: 3x30"  Soleus fitter stretch: 3x30"  Seated hip hinges: 5"x20  Cybex double leg press: 4x10 - 8 plates   Cybex hip abduction: 3x10 - 4 plates - bilateral   Standing hip abduction: " 3x10 - green theraband   Standing hip extension: 3x10 - green theraband     neuromuscular re-education activities to improve: Balance, Kinesthetic, Proprioception, and Posture for 15 minutes. The following activities were included:   Single leg forward step downs: 3x10 - green step   Seated single leg heel raises: 3x10 - 45# KB  Single leg heel raises: 3x10 - with big toe extension       Patient Education and Home Exercises       Education provided:   - home exercise program   - POC/Prognosis     Written Home Exercises Provided: yes. Exercises were reviewed and Jason was able to demonstrate them prior to the end of the session.  Jason demonstrated good  understanding of the education provided. See Electronic Medical Record under Patient Instructions for exercises provided during therapy sessions    Assessment   Jason is a 54 y.o. male referred to outpatient Physical Therapy with a medical diagnosis of plantar fascitis. Patient's signs and symptoms are consistent with medical diagnosis along with tarsal tunnel syndrome. Significant gastroc/soleus tightness noted along with medial/lateral instability. Continued with right ankle flexibility and mobility in conjunction with ankle stabilization training. Recently progressing lateral gluteus strength and patient is displaying some signs and symptoms of right sided lumbar radiculopathy.     Jason Is progressing well towards his goals.   Patient prognosis is Excellent.     Patient will continue to benefit from skilled outpatient physical therapy to address the deficits listed in the problem list box on initial evaluation, provide pt/family education and to maximize pt's level of independence in the home and community environment.     Patient's spiritual, cultural and educational needs considered and pt agreeable to plan of care and goals.     Anticipated barriers to physical therapy: none    Goals:   Short Term Goals: 3 weeks   1. Maintain compliance and understanding of HEP. -  MET  2. Decrease subjective pain rating from a 5/10 pain with standing/walking to a 2/10 pain with standing/walking. -  MET  3. Increase bilateral hip strength from a 4/5 to a 5/5 with manual muscle testing to offload plantar fascia. - MET        Long Term Goals: 6 weeks   1. Decrease FOTO limitation score from 40% limitation to </= 24% limitation for better functional outcomes. - PROGRESSING, NOT MET  2. Increase ankle dorsiflexion and plantar flexion active range of motion to within normal limits in order to offload plantar fascia. - PROGRESSING, NOT MET  3. Patient will be able to walk 1 mile without reproduction of (R) heel pain. - PROGRESSING, NOT MET  4. Decrease subjective pain rating from a 5/10 pain with standing/walking to a 0/10 pain with standing/walking. - PROGRESSING, NOT MET    Plan   Gastroc/soleus mobility  Ankle stabilization  Lateral hip stability     Carlos Asher, PT

## 2024-07-03 NOTE — PROGRESS NOTES
Virtual Regular Visit    Verification of patient location:    Patient is located at Home in the following state in which I hold an active license [place]      Assessment/Plan:    Problem List Items Addressed This Visit       Depression - Primary    Anxiety       Goals addressed in session: Goal 1          Reason for visit is No chief complaint on file.       Encounter provider Maria Ines Farfan LCSW      Recent Visits  No visits were found meeting these conditions.  Showing recent visits within past 7 days and meeting all other requirements  Today's Visits  Date Type Provider Dept   07/03/24 Telemedicine Maria Ines Farfan LCSW [facility]   Showing today's visits and meeting all other requirements  Future Appointments  No visits were found meeting these conditions.  Showing future appointments within next 150 days and meeting all other requirements       The patient was identified by name and date of birth. Ira Bonilla was informed that this is a telemedicine visit and that the visit is being conducted throughthe [employer] platform. She agrees to proceed..  My office door was closed. No one else was in the room.  She acknowledged consent and understanding of privacy and security of the video platform. The patient has agreed to participate and understands they can discontinue the visit at any time.    Patient is aware this is a billable service.     Subjective  Ira Bonilla is a 76 y.o. female  .      HPI     Past Medical History:   Diagnosis Date    Anxiety     Arthritis     Asthma     Cancer (HCC)     Candida infection, esophageal (HCC)     Chronic kidney disease     Chronic pain     COPD (chronic obstructive pulmonary disease) (HCC)     Depression     Depression     Gastroparesis     Gastroparesis     GERD (gastroesophageal reflux disease)     Hypertension     Irritable bowel syndrome (IBS)     Lactose intolerance Not sure    Migraines     MRSA (methicillin resistant  Subjective:       Patient ID: Jason Dutton is a 47 y.o. male.    Chief Complaint: Establish Care (with ear ringing) and Tinnitus    The patient is here for evaluation of new onset tinnitus.  He has had it for 3 months.  It is on the left side only and is constant, but noticeable mostly when he is in a quiet environment.  His high-pitched ringing.  He did see another ENT doctor who told him he had a hearing loss in that ear.  He does not have any vertigo.    He also has issues with his nasal breathing.  He never breathes completely clearly with his nose.  Breathing is worse at night.  He finds that if he elevates his nasal tip or spread sterilely alar rims that his breathing markedly improved.  He does have clear mucus and postnasal drip.      Review of Systems   Constitutional: Negative for activity change, appetite change, chills, fatigue and fever.   HENT: Positive for congestion, postnasal drip, sinus pressure, sore throat and tinnitus (left ear with associated hearing loss). Negative for drooling, ear discharge, ear pain, rhinorrhea, sinus pain, sneezing, trouble swallowing and voice change.    Eyes: Negative for photophobia, pain, discharge, redness, itching and visual disturbance.   Respiratory: Positive for cough. Negative for apnea, choking and shortness of breath.    Cardiovascular: Negative for chest pain and palpitations.   Gastrointestinal: Negative for abdominal pain, nausea and vomiting.   Musculoskeletal: Negative for neck pain and neck stiffness.   Skin: Negative.    Allergic/Immunologic: Negative for environmental allergies, food allergies and immunocompromised state.   Neurological: Positive for headaches. Negative for dizziness, facial asymmetry, speech difficulty, weakness, light-headedness and numbness.   Hematological: Negative for adenopathy. Does not bruise/bleed easily.   Psychiatric/Behavioral: Negative for confusion, decreased concentration and sleep disturbance.       Objective:       Physical Exam   Constitutional: He is oriented to person, place, and time. He appears well-developed and well-nourished. He is cooperative.   HENT:   Head: Normocephalic.   Right Ear: Tympanic membrane, external ear and ear canal normal.   Left Ear: Tympanic membrane, external ear and ear canal normal.   Nose: Mucosal edema (cyanotic, boggy inferior turbinates bilaterally), rhinorrhea (clear mucus bilaterally), nasal deformity (deviated high to the left and nasal tip deviated to the right, collapse of internal nasal valves bilaterally, respiration improved with elevation of the nasal tip) and septal deviation (deviated to the right with inferior septum deviated off the nasal spine to the left) present.   Mouth/Throat: Uvula is midline, oropharynx is clear and moist and mucous membranes are normal. No oropharyngeal exudate or posterior oropharyngeal erythema.   Eyes: Right eye exhibits no discharge and no exudate. Left eye exhibits no discharge and no exudate. Right conjunctiva is injected.   Neck: Trachea normal. No tracheal deviation present. No thyroid mass and no thyromegaly present.   Pulmonary/Chest: Breath sounds normal. No stridor. He has no wheezes. He has no rhonchi. He has no rales.   Abdominal: Soft.   Musculoskeletal:        Right shoulder: He exhibits normal range of motion and no tenderness.   Lymphadenopathy:        Head (right side): No submental, no submandibular, no preauricular, no posterior auricular and no occipital adenopathy present.        Head (left side): No submental, no submandibular, no preauricular, no posterior auricular and no occipital adenopathy present.     He has no cervical adenopathy.   Neurological: He is alert and oriented to person, place, and time. No cranial nerve deficit.   Skin: Skin is warm and dry. No rash noted.   Psychiatric: He has a normal mood and affect. His speech is normal and behavior is normal. Judgment and thought content normal. Cognition and memory are  Staphylococcus aureus)     Multiple thyroid nodules     Osteoporosis     Pneumonia     Psychiatric disorder        Past Surgical History:   Procedure Laterality Date    BACK SURGERY       SECTION      CHOLECYSTECTOMY      COLONOSCOPY  10/09/2013    ESOPHAGEAL DILATION      2017 [facility]    ESOPHAGOGASTRODUODENOSCOPY N/A 2023    Procedure: ESOPHAGOGASTRODUODENOSCOPY (EGD);  Surgeon: Dontrell Mcmahon MD;  [facility];  Service: General    ESOPHAGOGASTRODUODENOSCOPY N/A 2024    Procedure: ESOPHAGOGASTRODUODENOSCOPY (EGD);  Surgeon: Shilpa Price MD;  [facility];  Service: Thoracic    HERNIA REPAIR  2013    HIATAL HERNIA REPAIR      NISSEN FUNDOPLICATION      HI BIOPSY LIVER NEEDLE PERCUTANEOUS N/A 2023    Procedure: EXCISION BIOPSY LIVER;  Surgeon: Dontrell Mcmahon MD;  [facility];  Service: General    HI ESOPHAGOGASTRODUODENOSCOPY TRANSORAL DIAGNOSTIC N/A 2019    Procedure: ESOPHAGOGASTRODUODENOSCOPY (EGD);  Surgeon: Geovanna Winn MD;  Location: [facility];  Service: Gastroenterology    HI ESOPHAGOGASTRODUODENOSCOPY TRANSORAL DIAGNOSTIC N/A 2023    Procedure: ESOPHAGOGASTRODUODENOSCOPY (EGD);  Surgeon: Shilpa Price MD;  [facility];  Service: Thoracic    HI ESOPHAGOSCOPY FLEX BALLOON DILAT <30 MM DIAM N/A 2023    Procedure: CRE WIRE GUIDED BALLOON DILATATION ESOPHAGEAL;  Surgeon: Shilpa Price MD;  [facility];  Service: Thoracic    HI ESOPHAGOSCOPY FLEX BALLOON DILAT <30 MM DIAM N/A 2024    Procedure: ESOPHAGOGASTRODUODENOSCOPY WITH BALLOON DILATION;  Surgeon: Shilpa Price MD;  [facility];  Service: Thoracic    HI LAPS RPR PARAESPHGL HRNA INCL FUNDPLSTY W/O MESH N/A 2023    Procedure: robotic takedown of nissen fundoplication, redo hiatal hernia, partial fundoplication, with mesh;  Surgeon: Shilpa Price MD;  [facility];  Service: Thoracic    HI PYLOROPLASTY  N/A 11/08/2023    Procedure: PYLOROPLASTY LAPAROSCOPIC WITH ROBOT;  Surgeon: Dontrell Mcmahon MD;  [facility];  Service: General    ROTATOR CUFF REPAIR      SHOULDER SURGERY      SPINAL FUSION      TUBAL LIGATION  May 1975    Last child born    UPPER GASTROINTESTINAL ENDOSCOPY      US GUIDED THYROID BIOPSY      WISDOM TOOTH EXTRACTION         Current Outpatient Medications   Medication Sig Dispense Refill    acetaminophen (TYLENOL) 160 mg/5 mL suspension Take 20.3 mL (650 mg total) by mouth every 6 (six) hours as needed for mild pain 355 mL 0    albuterol (PROVENTIL HFA,VENTOLIN HFA) 90 mcg/act inhaler Inhale 2 puffs if needed      amitriptyline (ELAVIL) 50 mg tablet TAKE 1 TABLET BY MOUTH EVERYDAY AT BEDTIME (Patient taking differently: Take 25 mg by mouth daily at bedtime) 90 tablet 1    amLODIPine (NORVASC) 10 mg tablet       cetirizine (ZyrTEC) 10 MG chewable tablet Chew 10 mg daily.      cholecalciferol (VITAMIN D3) 1,000 units tablet Take 5,000 Units by mouth daily      losartan (COZAAR) 25 mg tablet       metoclopramide (Reglan) 10 mg tablet Take 1 tablet (10 mg total) by mouth 4 (four) times a day 120 tablet 1    multivitamin (THERAGRAN) TABS Take 1 tablet by mouth daily      Omega-3 Fatty Acids (OMEGA 3 PO) Take by mouth in the morning      ondansetron (ZOFRAN) 4 mg tablet Take 1 tablet (4 mg total) by mouth every 8 (eight) hours as needed for nausea or vomiting 15 tablet 0    rizatriptan (MAXALT-MLT) 10 mg disintegrating tablet as needed for migraine headache      sertraline (ZOLOFT) 50 mg tablet Take 25 mg by mouth daily Weening up to 50mg.  Weening of Evalil       No current facility-administered medications for this visit.        Allergies   Allergen Reactions    Latex Hives    Augmentin [Amoxicillin-Pot Clavulanate] GI Intolerance    Bactrim [Sulfamethoxazole-Trimethoprim] GI Intolerance    Clarithromycin GI Intolerance    Doxycycline Hives    Neomycin GI Intolerance    Polymyxin B GI  normal.             Assessment:       1. Sensorineural hearing loss (SNHL) of left ear with unrestricted hearing of right ear    2. Tinnitus of left ear    3. Allergic rhinitis, unspecified chronicity, unspecified seasonality, unspecified trigger    4. Nasal septal deviation    5. Nasal valve collapse    6. Hypertrophy of both inferior nasal turbinates        Plan:       I will schedule patient for an MRI of the internal auditory canals with contrast.  Assuming it is normal I will have him start using labral flavonoid's.  I will also have him start using Flonase and Claritin on a daily basis after this issue is settled     Intolerance    Zyvox [Linezolid] GI Bleeding    Cephalosporins GI Intolerance    Nsaids GI Intolerance    Penicillins GI Intolerance     Patient can only take levaquin and cipro    Prednisone GI Intolerance    Sulphasomidine GI Intolerance       Review of Systems    Video Exam    There were no vitals filed for this visit.    Physical Exam     Behavioral Health Psychotherapy Progress Note    Psychotherapy Provided: Individual Psychotherapy     1. Current moderate episode of major depressive disorder without prior episode (HCC)        2. Anxiety            Goals addressed in session: Goal 1     DATA: Ira shared that she is happy about her grandson getting a job.  She said that she had an endoscopy done and food was undigested in her stomach and she is taking medication to improve her digestion which is causing cramps.  Ira was able to update her treatment plan during session successfully.  Art will talk to Dario's counselor next week possibly with Ira to discuss family dynamics and how Dario feels Art is taking Deloit away from spending time with her. She talked about her granddaughter Yanira using her shared bathroom and getting upset when Ira is using it when she needs it.  Therapist encouraged her to openly communicate thoughts and feelings about her family situation.  During this session, this clinician used the following therapeutic modalities: Client-centered Therapy    Substance Abuse was not addressed during this session. If the client is diagnosed with a co-occurring substance use disorder, please indicate any changes in the frequency or amount of use: . Stage of change for addressing substance use diagnoses: No substance use/Not applicable    ASSESSMENT:  Ira Bonilla presents with a Euthymic/ normal mood.     her affect is Normal range and intensity, which is congruent, with her mood and the content of the session. The client has made progress on their goals.     Ira Bonilla  "presents with a none risk of suicide, none risk of self-harm, and none risk of harm to others.    For any risk assessment that surpasses a \"low\" rating, a safety plan must be developed.    A safety plan was indicated: no  If yes, describe in detail     PLAN: Between sessions, Ira Bonilla will utilize open communication with her family. At the next session, the therapist will use Client-centered Therapy to address family dynamics, moods.    Behavioral Health Treatment Plan and Discharge Planning: Ira Bonilla is aware of and agrees to continue to work on their treatment plan. They have identified and are working toward their discharge goals. yes    Visit start and stop times:    07/03/24  Start Time: 1305  Stop Time: 1355  Total Visit Time: 50 minutes        "

## 2024-11-29 ENCOUNTER — PATIENT MESSAGE (OUTPATIENT)
Dept: PODIATRY | Facility: CLINIC | Age: 54
End: 2024-11-29
Payer: COMMERCIAL

## 2024-12-02 ENCOUNTER — OFFICE VISIT (OUTPATIENT)
Dept: PODIATRY | Facility: CLINIC | Age: 54
End: 2024-12-02
Payer: COMMERCIAL

## 2024-12-02 VITALS
DIASTOLIC BLOOD PRESSURE: 94 MMHG | HEART RATE: 51 BPM | SYSTOLIC BLOOD PRESSURE: 150 MMHG | WEIGHT: 242.5 LBS | RESPIRATION RATE: 18 BRPM | HEIGHT: 76 IN | BODY MASS INDEX: 29.53 KG/M2

## 2024-12-02 DIAGNOSIS — M72.2 PLANTAR FASCIITIS: ICD-10-CM

## 2024-12-02 DIAGNOSIS — G57.51 TARSAL TUNNEL SYNDROME OF RIGHT SIDE: Primary | ICD-10-CM

## 2024-12-02 DIAGNOSIS — M72.2 PLANTAR FASCIITIS, BILATERAL: ICD-10-CM

## 2024-12-02 PROCEDURE — 99999 PR PBB SHADOW E&M-EST. PATIENT-LVL III: CPT | Mod: PBBFAC,,, | Performed by: PODIATRIST

## 2024-12-02 PROCEDURE — 1160F RVW MEDS BY RX/DR IN RCRD: CPT | Mod: CPTII,S$GLB,, | Performed by: PODIATRIST

## 2024-12-02 PROCEDURE — 3077F SYST BP >= 140 MM HG: CPT | Mod: CPTII,S$GLB,, | Performed by: PODIATRIST

## 2024-12-02 PROCEDURE — 3080F DIAST BP >= 90 MM HG: CPT | Mod: CPTII,S$GLB,, | Performed by: PODIATRIST

## 2024-12-02 PROCEDURE — 1159F MED LIST DOCD IN RCRD: CPT | Mod: CPTII,S$GLB,, | Performed by: PODIATRIST

## 2024-12-02 PROCEDURE — 3008F BODY MASS INDEX DOCD: CPT | Mod: CPTII,S$GLB,, | Performed by: PODIATRIST

## 2024-12-02 PROCEDURE — 99214 OFFICE O/P EST MOD 30 MIN: CPT | Mod: S$GLB,,, | Performed by: PODIATRIST

## 2024-12-02 NOTE — PROGRESS NOTES
Jone Peter DPM     Assessment:       1. Tarsal tunnel syndrome of right side  MRI [place] WO Contrast RT    EMG W/ ULTRASOUND AND NERVE CONDUCTION TEST 1 Extremity      2. Plantar fasciitis, bilateral        3. Plantar fasciitis              Plan:       Jason was seen today for heel pain and foot problem.    Diagnoses and all orders for this visit:    Tarsal tunnel syndrome of right side  -     MRI [place] WO Contrast RT; Future  -     EMG W/ ULTRASOUND AND NERVE CONDUCTION TEST 1 Extremity; Future    Plantar fasciitis, bilateral    Plantar fasciitis          I counseled the patient on the patient's conditions, their implications and medical management.     We discussed the etiology of the problem and the patient was given literature regarding plantar fasciitis and stretching.      We also discussed proper shoe gear.    custom molded orthotics were also recommended.  Discussed icing the affected area as needed and also wearing appropriate shoe gear and avoiding flats, slippers, sandals, and going barefoot.     Discussed options for peripheral neuropathy/nerve entrapment syndrome including nerve block therapy, surgical nerve entrapment decompression procedures, and various vitamins and supplementation available shown to improve nerve function.    Follow-up once MRI/EMG studies are available for review.    Subjective:      Patient ID: Jason Dutton is a 54 y.o. male.    Chief Complaint:   Chief Complaint   Patient presents with    Heel Pain     Rt heel numbness pain     Foot Problem     Arch numbness        CC - foot pain: patient presents to the podiatry clinic  with concerns of Heel Pain (Rt heel numbness pain ) and Foot Problem (Arch numbness )  Chronic, exacerbated past month.   He has previously seen Dr. Donnelly for similar.        Past Medical History:   Diagnosis Date    DJD (degenerative joint disease), lumbar      Past Surgical History:   Procedure Laterality Date    EPIDURAL STEROID INJECTION  N/A 3/24/2021    Procedure: INJECTION, STEROID, EPIDURAL, IL L4-5;  Surgeon: Jake Felder MD;  Location: [facility] PAIN MGT;  Service: Pain Management;  Laterality: N/A;  CONSENT NEEDED    INJECTION OF ANESTHETIC AGENT AROUND NERVE Right 2019    Procedure: BLOCK, NERVE, L2,L3,L4,L5 MEDIAL BRANCH;  Surgeon: Jake Felder MD;  Location: [facility] PAIN MGT;  Service: Pain Management;  Laterality: Right;    INJECTION OF JOINT Right 2022    Procedure: INJECTION, JOINT, SI RIGHT;  Surgeon: Jake Felder MD;  Location: [facility] PAIN [facility]GT;  Service: Pain Management;  Laterality: Right;    RADIOFREQUENCY ABLATION Right 10/2/2019    Procedure: RADIOFREQUENCY ABLATION, RIGHT L2-L3-L4-L5 MEDIAL BRANCH;  Surgeon: Jake Felder MD;  Location: [facility] PAIN [facility]GT;  Service: Pain Management;  Laterality: Right;     Family History   Problem Relation Name Age of Onset    Coronary artery disease Father       Current Outpatient Medications   Medication Sig Dispense Refill    ivermectin (STROMECTOL) 3 mg Tab SMARTSI Tablet(s) By Mouth Daily      metaxalone (SKELAXIN) 800 MG tablet Take 1 tablet (800 mg total) by mouth 3 (three) times daily as needed for Pain. 270 tablet 2    diclofenac sodium (VOLTAREN) 1 % Gel Apply 2 g topically 4 (four) times daily. (Patient not taking: Reported on 2024) 1 each 2    ibuprofen (ADVIL,MOTRIN) 600 MG tablet TK 1 T PO TID (Patient not taking: Reported on 2024)      naproxen (NAPROSYN) 500 MG tablet Take 1 tablet (500 mg total) by mouth 2 (two) times daily with meals. (Patient not taking: Reported on 2024) 60 tablet 2     No current facility-administered medications for this visit.     Review of patient's allergies indicates:   Allergen Reactions    Penicillins Hives     Social History     Socioeconomic History    Marital status:    Tobacco Use    Smoking status: Never    Smokeless tobacco: Never   Substance and Sexual Activity    Alcohol use: Yes     Comment: socially     Drug use: No     Social Drivers of Health     Financial Resource Strain: Patient Declined (11/25/2024)    Overall Financial Resource Strain (CARDIA)     Difficulty of Paying Living Expenses: Patient declined   Food Insecurity: Patient Declined (11/25/2024)    Hunger Vital Sign     Worried About Running Out of Food in the Last Year: Patient declined     Ran Out of Food in the Last Year: Patient declined   Physical Activity: Sufficiently Active (11/25/2024)    Exercise Vital Sign     Days of Exercise per Week: 6 days     Minutes of Exercise per Session: 90 min   Stress: Patient Declined (11/25/2024)    [employer] - Occupational Stress Questionnaire     Feeling of Stress : Patient declined   Housing Stability: Unknown (11/25/2024)    Housing Stability Vital Sign     Unable to Pay for Housing in the Last Year: Patient declined       REVIEW OF SYSTEMS: Negative as documented below as well as positive findings in bold.       Constitutional  Respiratory  Gastrointestinal  Skin   - Fever - Cough - Heartburn - Rash   - Chills - Spit blood - Nausea - Itching   - Weight Loss - Shortness of breath - Vomiting - Nail pain   - Malaise/Fatigue - Wheezing - Abdominal Pain  Wound/Ulcer   - Weight Gain   - Blood in Stool  Poor wound healing       - Diarrhea          Cardiovascular  Genitourinary  Neurological  HEENT   - Chest Pain - Dysuria - Burning Sensation of feet - Headache   - Palpitations - Hematuria - Tingling / Paresthesia - Congestion   - Pain at night in legs - Flank Pain - Dizziness - Sore Throat   - Cramping   - Tremor - Blurred Vision   - Leg Swelling   - Sensory Change - Double Vision   - Dizzy when standing   - Speech Change - Eye Redness       - Focal Weakness - Dry Eyes       - Loss of Consciousness          Endocrine  Musculoskeletal  Psychiatric   - Cold intolerance - Muscle Pain - Depression   - Heat intolerance - Neck Pain - Insomnia   - Anemia - Joint Pain - Memory Loss   -  Easy  "bruising, bleeding - Heel pain - Anxiety      Toe Pain        Leg/Ankle/Foot Pain         Objective:     BP (!) 150/94   Pulse (!) 51   Resp 18   Ht 6' 4" (1.93 m)   Wt 110 kg (242 lb 8.1 oz)   BMI 29.52 kg/m²   Vitals:    12/02/24 1139   BP: (!) 150/94   Pulse: (!) 51   Resp: 18   Weight: 110 kg (242 lb 8.1 oz)   Height: 6' 4" (1.93 m)   PainSc: 0-No pain       Physical Exam    General Appearance:   Patient appears well developed, well nourished  Patient appears stated age    Psychiatric:   Patient is oriented to time, place, and person.  Patient has appropriate mood and affect    Neck:  Trachea Midline  No visible masses    Respiratory/Ears:  No distress or labored breathing.  Able to differentiate between normal talking voice and whisper.  Able to follow commands    Eyes:  Visual Acuity intact  Lids and conjunctivae normal. No discoloration noted.    Foot Exam  Physical Exam  Ortho Exam  Ortho/SPM Exam  Foot/Ankle Musculoskeletal Exam    B/l LE exam con't:  V:  DP 2/4, PT 2/4   CRT< 3s to all digits tested   Tibial and popliteal lymph nodes are w/o abnormality   Edema: absent, varicosities: absent    N:  Patient displays normal ankle reflexes   SILT in SP/DP/T/Radha/Saph distributions    Diminished sensation along distribution of lateral plantar nerve/indicative of possible tarsal tunnel syndrome symptoms as well as medial calcaneal nerve/Garcia's nerve distribution.    Ortho: +Motor EHL/FHL/TA/GA   equinus deformity present  There is moderate pain with palpation of b/l medial calcaneal tubercle  Compartments soft/compressible. No pain on passive stretch of big toe. No calf  Pain.    Derm:  skin intact, skin warm and dry, skin without ulcers or lesions, skin without induration, nails normal, texture turgor well hydrated    "

## 2024-12-09 ENCOUNTER — PATIENT MESSAGE (OUTPATIENT)
Dept: PODIATRY | Facility: CLINIC | Age: 54
End: 2024-12-09
Payer: COMMERCIAL

## 2024-12-12 ENCOUNTER — PATIENT MESSAGE (OUTPATIENT)
Dept: PODIATRY | Facility: CLINIC | Age: 54
End: 2024-12-12
Payer: COMMERCIAL

## 2024-12-20 ENCOUNTER — TELEPHONE (OUTPATIENT)
Dept: PHYSICAL MEDICINE AND REHAB | Facility: CLINIC | Age: 54
End: 2024-12-20
Payer: COMMERCIAL

## 2024-12-30 ENCOUNTER — PROCEDURE VISIT (OUTPATIENT)
Dept: PHYSICAL MEDICINE AND REHAB | Facility: CLINIC | Age: 54
End: 2024-12-30
Payer: COMMERCIAL

## 2024-12-30 VITALS — HEIGHT: 76 IN | BODY MASS INDEX: 29.47 KG/M2 | WEIGHT: 242 LBS

## 2024-12-30 DIAGNOSIS — G57.51 TARSAL TUNNEL SYNDROME OF RIGHT SIDE: ICD-10-CM

## 2024-12-30 PROCEDURE — 99499 UNLISTED E&M SERVICE: CPT | Mod: S$GLB,,, | Performed by: STUDENT IN AN ORGANIZED HEALTH CARE EDUCATION/TRAINING PROGRAM

## 2024-12-30 PROCEDURE — 95909 NRV CNDJ TST 5-6 STUDIES: CPT | Mod: S$GLB,,, | Performed by: STUDENT IN AN ORGANIZED HEALTH CARE EDUCATION/TRAINING PROGRAM

## 2024-12-30 PROCEDURE — 95886 MUSC TEST DONE W/N TEST COMP: CPT | Mod: S$GLB,,, | Performed by: STUDENT IN AN ORGANIZED HEALTH CARE EDUCATION/TRAINING PROGRAM

## 2024-12-30 NOTE — PROCEDURES
[facility]  Physical Medicine and Rehabilitation Clinic  [address]  [place] [address]         Full Name: Jason Dutton Gender: Male  Patient ID: [id number] YOB: 1970      Visit Date: 12/30/2024 2:14 PM  Age: 54 Years  Examining Physician: Loly Wiggins MD  Height: 6 feet 4 inch  Weight: 242 lbs  Patient History: 54-year-old active male who presents with right heel numbness x 6-8 months.  No specific injury.  Had recent MRI done at outside imaging center which demonstrated normal tarsal tunnel, intact neurovascular structures, but plantar fasciitis with tear, retrocalcaneal bursitis, and Achilles partial tear and tendinitis.  Exam:  Normal muscle bulk throughout the right lower extremity.  5/5 strength right knee extension, ankle dorsiflexion, ankle plantar flexion, toe extension      Sensory NCS      Nerve / Sites Rec. Site Onset Lat Peak Lat NP Amp PP Amp Segments Distance Peak Diff Velocity Comment     ms ms µV µV  mm ms m/s    R Sural - (Antidromic)      Calf Ankle 3.39 3.96 5.3 7.9 Calf - Ankle 140  41    L Sural - (Antidromic)      Calf Ankle 3.18 3.96 4.7 10.1 Calf - Ankle 140  44    L Medial plantar, Lateral plantar - (Orthodromic)      Medial plantar Sole Ankle NR NR NR NR Medial plantar Sole - Ankle 140  NR       Lateral plantar Sole Ankle NR NR NR NR Lateral plantar Sole - Ankle 140  NR          Medial plantar Sole - Lateral plantar Sole  NR     R Superficial peroneal - (Antidromic)      Lat leg Ankle 3.54 4.29 6.1 11.5 Lat leg - Ankle 140  40        Motor NCS      Nerve / Sites Muscle Latency Amplitude Segments Dist. Lat Diff Velocity Comments     ms mV  mm ms m/s    R Peroneal - EDB      Ankle EDB 5.23 9.2 Ankle - EDB 80         B. Fib Head EDB 12.15 9.0 B. Fib Head - Ankle 337 6.92 48.7    R Tibial - AH      Ankle AH 5.23 14.6 Ankle - AH 80          EMG Summary Table     Spontaneous MUAP Recruitment   Muscle IA Fib PSW Fasc H.F. Amp Dur.  PPP Pattern   R. Tibialis anterior N None None None None N N N N   R. Gastrocnemius (Medial head) N None None None None N N N N   R. Peroneus longus N None None None None N N N N   R. Vastus medialis N None None None None N N N N   R. Extensor digitorum brevis N None None None None N N N N       Summary    Sensory: The bilateral sural sensory nerve action potentials were normal.  The right superficial peroneal sensory nerve action potential was normal.  Unable to obtain the left medial or lateral plantar nerve so the right side was not attempted.  The study is technically challenging.    The needle EMG study was normal in all 5 muscles tested.    Conclusion:  Normal, technically limited study    There is no electrodiagnostic evidence of a right tibial, peroneal, superficial peroneal, or sural neuropathy.  Unable to obtain the left (asymptomatic) medial and lateral plantar nerve response, so it was not attempted on the right (symptomatic) side.  Given this, can not rule out tarsal tunnel syndrome.  This study is technically challenging, consider imaging and clinical picture for diagnosis.  There is no electrodiagnostic evidence of a right lumbar radiculopathy or lumbosacral plexopathy.    ________________________  Loly Wiggins MD

## 2025-01-02 ENCOUNTER — PATIENT MESSAGE (OUTPATIENT)
Dept: PODIATRY | Facility: CLINIC | Age: 55
End: 2025-01-02
Payer: COMMERCIAL

## 2025-01-09 ENCOUNTER — OFFICE VISIT (OUTPATIENT)
Dept: PODIATRY | Facility: CLINIC | Age: 55
End: 2025-01-09
Payer: COMMERCIAL

## 2025-01-09 VITALS
BODY MASS INDEX: 29.26 KG/M2 | HEIGHT: 76 IN | HEART RATE: 55 BPM | WEIGHT: 240.31 LBS | DIASTOLIC BLOOD PRESSURE: 95 MMHG | SYSTOLIC BLOOD PRESSURE: 145 MMHG

## 2025-01-09 DIAGNOSIS — G89.4 CHRONIC PAIN SYNDROME: ICD-10-CM

## 2025-01-09 DIAGNOSIS — M47.816 LUMBAR FACET ARTHROPATHY: ICD-10-CM

## 2025-01-09 DIAGNOSIS — M47.816 LUMBAR SPONDYLOSIS: ICD-10-CM

## 2025-01-09 DIAGNOSIS — G57.51 TARSAL TUNNEL SYNDROME OF RIGHT SIDE: Primary | ICD-10-CM

## 2025-01-09 DIAGNOSIS — M79.671 RIGHT FOOT PAIN: ICD-10-CM

## 2025-01-09 DIAGNOSIS — M54.17 LUMBOSACRAL RADICULOPATHY: ICD-10-CM

## 2025-01-09 PROCEDURE — 3077F SYST BP >= 140 MM HG: CPT | Mod: CPTII,S$GLB,, | Performed by: PODIATRIST

## 2025-01-09 PROCEDURE — 1160F RVW MEDS BY RX/DR IN RCRD: CPT | Mod: CPTII,S$GLB,, | Performed by: PODIATRIST

## 2025-01-09 PROCEDURE — 1159F MED LIST DOCD IN RCRD: CPT | Mod: CPTII,S$GLB,, | Performed by: PODIATRIST

## 2025-01-09 PROCEDURE — 64450 NJX AA&/STRD OTHER PN/BRANCH: CPT | Mod: RT,S$GLB,, | Performed by: PODIATRIST

## 2025-01-09 PROCEDURE — 99999 PR PBB SHADOW E&M-EST. PATIENT-LVL III: CPT | Mod: PBBFAC,,, | Performed by: PODIATRIST

## 2025-01-09 PROCEDURE — 3008F BODY MASS INDEX DOCD: CPT | Mod: CPTII,S$GLB,, | Performed by: PODIATRIST

## 2025-01-09 PROCEDURE — 3080F DIAST BP >= 90 MM HG: CPT | Mod: CPTII,S$GLB,, | Performed by: PODIATRIST

## 2025-01-09 PROCEDURE — 99214 OFFICE O/P EST MOD 30 MIN: CPT | Mod: 25,S$GLB,, | Performed by: PODIATRIST

## 2025-01-09 RX ORDER — DICLOFENAC SODIUM 10 MG/G
2 GEL TOPICAL 4 TIMES DAILY
Qty: 1 EACH | Refills: 2 | Status: SHIPPED | OUTPATIENT
Start: 2025-01-09

## 2025-01-13 NOTE — PROGRESS NOTES
Jone Peter DPM     Assessment:       1. Tarsal tunnel syndrome of right side        2. Right foot pain        3. Lumbar spondylosis  diclofenac sodium (VOLTAREN) 1 % Gel      4. Lumbar facet arthropathy        5. Chronic pain syndrome  diclofenac sodium (VOLTAREN) 1 % Gel      6. Lumbosacral radiculopathy  diclofenac sodium (VOLTAREN) 1 % Gel            Plan:       Jason was seen today for follow-up and foot pain.    Diagnoses and all orders for this visit:    Tarsal tunnel syndrome of right side    Right foot pain    Lumbar spondylosis  -     diclofenac sodium (VOLTAREN) 1 % Gel; Apply 2 g topically 4 (four) times daily.    Lumbar facet arthropathy    Chronic pain syndrome  -     diclofenac sodium (VOLTAREN) 1 % Gel; Apply 2 g topically 4 (four) times daily.    Lumbosacral radiculopathy  -     diclofenac sodium (VOLTAREN) 1 % Gel; Apply 2 g topically 4 (four) times daily.          I counseled the patient on the patient's conditions, their implications and medical management.     We discussed the etiology of the problem and the patient was given literature regarding plantar fasciitis and stretching.      We also discussed proper shoe gear.    custom molded orthotics were also recommended.  Discussed icing the affected area as needed and also wearing appropriate shoe gear and avoiding flats, slippers, sandals, and going barefoot.     Discussed options for peripheral neuropathy/nerve entrapment syndrome including nerve block therapy, surgical nerve entrapment decompression procedures, and various vitamins and supplementation available shown to improve nerve function.    Independent visualization of imaging was performed.  Results were reviewed in detail with patient.      Nerve injection:    Performed by:  Jone Peter DPM    Preop diagnosis:  Neuropathy symptoms/paresthesias/pain    The area overlying the right medial calcaneal nerve(s) was sterilely prepped, verbal consent was obtained, 2 cc's of 0.5%  Marcaine plain was infiltrated around the affected nerve(s) for a diagnostic nerve block.  This was well tolerated with no complications.    Follow-up in 3 months.    Subjective:      Patient ID: Jason Dutton is a 54 y.o. male.    Chief Complaint:   Chief Complaint   Patient presents with    Follow-up    Foot Pain     MRI review sign consents        CC - foot pain: patient presents to the podiatry clinic  with concerns of Follow-up and Foot Pain (MRI review sign consents )  Chronic, exacerbated past month.   He has previously seen Dr. Donnelly for similar.        Past Medical History:   Diagnosis Date    DJD (degenerative joint disease), lumbar      Past Surgical History:   Procedure Laterality Date    EPIDURAL STEROID INJECTION N/A 3/24/2021    Procedure: INJECTION, STEROID, EPIDURAL, IL L4-5;  Surgeon: Jake Felder MD;  Location: [facility] [place] PAIN [facility]GT;  Service: Pain Management;  Laterality: N/A;  CONSENT NEEDED    INJECTION OF ANESTHETIC AGENT AROUND NERVE Right 9/4/2019    Procedure: BLOCK, NERVE, L2,L3,L4,L5 MEDIAL BRANCH;  Surgeon: Jake Felder MD;  Location: [facility] [place] PAIN [facility]GT;  Service: Pain Management;  Laterality: Right;    INJECTION OF JOINT Right 2/9/2022    Procedure: INJECTION, JOINT, SI RIGHT;  Surgeon: Jake Felder MD;  Location: [facility] [place] PAIN [facility]GT;  Service: Pain Management;  Laterality: Right;    RADIOFREQUENCY ABLATION Right 10/2/2019    Procedure: RADIOFREQUENCY ABLATION, RIGHT L2-L3-L4-L5 MEDIAL BRANCH;  Surgeon: Jake Felder MD;  Location: [facility] [place] PAIN [facility]GT;  Service: Pain Management;  Laterality: Right;     Family History   Problem Relation Name Age of Onset    Coronary artery disease Father       Current Outpatient Medications   Medication Sig Dispense Refill    ibuprofen (ADVIL,MOTRIN) 600 MG tablet       metaxalone (SKELAXIN) 800 MG tablet Take 1 tablet (800 mg total) by mouth 3 (three) times daily as needed for Pain. 270 tablet 2    naproxen (NAPROSYN) 500 MG tablet Take 1 tablet (500  mg total) by mouth 2 (two) times daily with meals. 60 tablet 2    diclofenac sodium (VOLTAREN) 1 % Gel Apply 2 g topically 4 (four) times daily. 1 each 2    ivermectin (STROMECTOL) 3 mg Tab SMARTSI Tablet(s) By Mouth Daily       No current facility-administered medications for this visit.     Review of patient's allergies indicates:   Allergen Reactions    Penicillins Hives     Social History     Socioeconomic History    Marital status:    Tobacco Use    Smoking status: Never    Smokeless tobacco: Never   Substance and Sexual Activity    Alcohol use: Yes     Comment: socially    Drug use: No     Social Drivers of Health     Financial Resource Strain: Patient Declined (2024)    Overall Financial Resource Strain (CARDIA)     Difficulty of Paying Living Expenses: Patient declined   Food Insecurity: Patient Declined (2024)    Hunger Vital Sign     Worried About Running Out of Food in the Last Year: Patient declined     Ran Out of Food in the Last Year: Patient declined   Physical Activity: Sufficiently Active (2024)    Exercise Vital Sign     Days of Exercise per Week: 6 days     Minutes of Exercise per Session: 90 min   Stress: Patient Declined (2024)    [employer] [place] [employer] - Occupational Stress Questionnaire     Feeling of Stress : Patient declined   Housing Stability: Unknown (2024)    Housing Stability Vital Sign     Unable to Pay for Housing in the Last Year: Patient declined       REVIEW OF SYSTEMS: Negative as documented below as well as positive findings in bold.       Constitutional  Respiratory  Gastrointestinal  Skin   - Fever - Cough - Heartburn - Rash   - Chills - Spit blood - Nausea - Itching   - Weight Loss - Shortness of breath - Vomiting - Nail pain   - Malaise/Fatigue - Wheezing - Abdominal Pain  Wound/Ulcer   - Weight Gain   - Blood in Stool  Poor wound healing       - Diarrhea          Cardiovascular  Genitourinary  Neurological  HEENT   -  "Chest Pain - Dysuria - Burning Sensation of feet - Headache   - Palpitations - Hematuria - Tingling / Paresthesia - Congestion   - Pain at night in legs - Flank Pain - Dizziness - Sore Throat   - Cramping   - Tremor - Blurred Vision   - Leg Swelling   - Sensory Change - Double Vision   - Dizzy when standing   - Speech Change - Eye Redness       - Focal Weakness - Dry Eyes       - Loss of Consciousness          Endocrine  Musculoskeletal  Psychiatric   - Cold intolerance - Muscle Pain - Depression   - Heat intolerance - Neck Pain - Insomnia   - Anemia - Joint Pain - Memory Loss   -  Easy bruising, bleeding - Heel pain - Anxiety      Toe Pain        Leg/Ankle/Foot Pain         Objective:     BP (!) 145/95 (BP Location: Right arm, Patient Position: Sitting)   Pulse (!) 55   Ht 6' 4" (1.93 m)   Wt 109 kg (240 lb 4.8 oz)   BMI 29.25 kg/m²   Vitals:    01/09/25 1129   BP: (!) 145/95   Pulse: (!) 55   Weight: 109 kg (240 lb 4.8 oz)   Height: 6' 4" (1.93 m)       Physical Exam    General Appearance:   Patient appears well developed, well nourished  Patient appears stated age    Psychiatric:   Patient is oriented to time, place, and person.  Patient has appropriate mood and affect    Neck:  Trachea Midline  No visible masses    Respiratory/Ears:  No distress or labored breathing.  Able to differentiate between normal talking voice and whisper.  Able to follow commands    Eyes:  Visual Acuity intact  Lids and conjunctivae normal. No discoloration noted.    Foot Exam  Physical Exam  Ortho Exam  Ortho/SPM Exam  Foot/Ankle Musculoskeletal Exam    B/l LE exam con't:  V:  DP 2/4, PT 2/4   CRT< 3s to all digits tested   Tibial and popliteal lymph nodes are w/o abnormality   Edema: absent, varicosities: absent    N:  Patient displays normal ankle reflexes   SILT in SP/DP/T/Radha/Saph distributions    Diminished sensation along distribution of lateral plantar nerve/indicative of possible tarsal tunnel syndrome symptoms as well as " medial calcaneal nerve/Garcia's nerve distribution.    Ortho: +Motor EHL/FHL/TA/GA   equinus deformity present  There is moderate pain with palpation of b/l medial calcaneal tubercle  Compartments soft/compressible. No pain on passive stretch of big toe. No calf  Pain.    Derm:  skin intact, skin warm and dry, skin without ulcers or lesions, skin without induration, nails normal, texture turgor well hydrated

## (undated) DEVICE — DRESSING LEUKOPLAST FLEX 1X3IN